# Patient Record
Sex: FEMALE | Employment: OTHER | URBAN - METROPOLITAN AREA
[De-identification: names, ages, dates, MRNs, and addresses within clinical notes are randomized per-mention and may not be internally consistent; named-entity substitution may affect disease eponyms.]

---

## 2017-04-06 ENCOUNTER — HOSPITAL ENCOUNTER (OUTPATIENT)
Dept: ULTRASOUND IMAGING | Age: 82
Discharge: HOME | End: 2017-04-06
Attending: FAMILY MEDICINE
Payer: MEDICARE

## 2017-04-06 DIAGNOSIS — R11.2 VOMITING WITH NAUSEA, NOT INTRACTABLE: ICD-10-CM

## 2017-04-06 PROCEDURE — 76856 US EXAM PELVIC COMPLETE: CPT

## 2017-04-06 PROCEDURE — 76700 US EXAM ABDOM COMPLETE: CPT

## 2017-06-25 ENCOUNTER — APPOINTMENT (OUTPATIENT)
Dept: CT IMAGING | Age: 82
End: 2017-06-25
Attending: STUDENT IN AN ORGANIZED HEALTH CARE EDUCATION/TRAINING PROGRAM
Payer: MEDICARE

## 2017-06-25 ENCOUNTER — HOSPITAL ENCOUNTER (EMERGENCY)
Age: 82
Discharge: HOME | End: 2017-06-25
Attending: STUDENT IN AN ORGANIZED HEALTH CARE EDUCATION/TRAINING PROGRAM
Payer: MEDICARE

## 2017-06-25 VITALS
HEART RATE: 60 BPM | BODY MASS INDEX: 29.25 KG/M2 | SYSTOLIC BLOOD PRESSURE: 133 MMHG | RESPIRATION RATE: 15 BRPM | OXYGEN SATURATION: 94 % | DIASTOLIC BLOOD PRESSURE: 78 MMHG | TEMPERATURE: 98 F | WEIGHT: 130 LBS | HEIGHT: 56 IN
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
## 2017-06-25 DIAGNOSIS — S09.90XA CHI (CLOSED HEAD INJURY), INITIAL ENCOUNTER: ICD-10-CM

## 2017-06-25 PROCEDURE — 72125 CT NECK SPINE W/O DYE: CPT

## 2017-06-25 PROCEDURE — 70450 CT HEAD/BRAIN W/O DYE: CPT

## 2017-06-25 PROCEDURE — 99284 EMERGENCY DEPT VISIT MOD MDM: CPT

## 2017-06-25 NOTE — DISCHARGE INSTRUCTIONS
We hope that we have addressed all of your medical concerns. The examination and treatment you received in the Emergency Department were for an emergent problem and were not intended as complete care. It is important that you follow up with your healthcare provider(s) for ongoing care. If your symptoms worsen or do not improve as expected, and you are unable to reach your usual health care provider(s), you should return to the Emergency Department. Today's healthcare is undergoing tremendous change, and patient satisfaction surveys are one of the many tools to assess the quality of medical care. You may receive a survey from the [employer] regarding your experience in the Emergency Department. I hope that your experience has been completely positive, particularly the medical care that I provided. As such, please participate in the survey; anything less than excellent does not meet my expectations or intentions. [facility] and [address] [place] [address] participate in nationally recognized quality of care measures. If your blood pressure is greater than 120/80, as reported below, we urge that you seek medical care to address the potential of high blood pressure, commonly known as hypertension. Hypertension can be hereditary or can be caused by certain medical conditions, pain, stress, or \"white coat syndrome. \"       Please make an appointment with your health care provider(s) for follow up of your Emergency Department visit. VITALS:   Patient Vitals for the past 8 hrs:   Temp Pulse Resp BP SpO2   06/25/17 0400 97.9 °F (36.6 °C) 79 16 153/81 95 %          Thank you for allowing us to provide you with medical care today. We realize that you have many choices for your emergency care needs. Please choose us in the future for any continued health care needs. Barron [place], [facility].   Office: [phone]            No results found for this or any previous visit (from the past 24 hour(s)). Ct Head Wo Cont    Result Date: 6/25/2017  INDICATION: fall, closed head injury EXAM: CT HEAD without contrast. CT dose reduction was achieved through use of a standardized protocol tailored for this examination and automatic exposure control for dose modulation. FINDINGS: Unenhanced CT Head is performed. The brain parenchyma is unremarkable in appearance for age, without evidence for infarct. There is no bleed, mass, shift, hydrocephalus or extra-axial fluid collection. Bone windows are unremarkable. IMPRESSION: No Intracranial Disease Evident on Head CT. Ct Spine Cerv Wo Cont    Result Date: 6/25/2017  INDICATION: Neck pain following trauma EXAM: Axial unenhanced CT of the cervical spine is performed with 2D coronal and sagittal reformatted images provided. CT dose reduction was achieved through use of a standardized protocol tailored for this examination and automatic exposure control for dose modulation. There is no fracture or significant subluxation. There is multilevel degenerative disc disease. There is no prevertebral soft tissue swelling. IMPRESSION: No fracture. Learning About a Closed Head Injury  What is a closed head injury? A closed head injury happens when your head gets hit hard. The strong force of the blow causes your brain to shake in your skull. This movement can cause the brain to bruise, swell, or tear. Sometimes nerves or blood vessels also get damaged. This can cause bleeding in or around the brain. A concussion is a type of closed head injury. What are the symptoms? If you have a mild concussion, you may have a mild headache or feel \"not quite right. \" These symptoms are common. They usually go away over a few days to 4 weeks. But sometimes after a concussion, you feel like you can't function as well as before the injury. And you have new symptoms.  This is called postconcussive syndrome. You may:  · Find it harder to solve problems, think, concentrate, or remember. · Have headaches. · Have changes in your sleep patterns, such as not being able to sleep or sleeping all the time. · Have changes in your personality. · Not be interested in your usual activities. · Feel angry or anxious without a clear reason. · Lose your sense of taste or smell. · Be dizzy, lightheaded, or unsteady. It may be hard to stand or walk. How is a closed head injury treated? Any person who may have a concussion needs to see a doctor. Some people have to stay in the hospital to be watched. Others can go home safely. If you go home, follow your doctor's instructions. He or she will tell you if you need someone to watch you closely for the next 24 hours or longer. Rest is the best treatment. Get plenty of sleep at night. And try to rest during the day. · Avoid activities that are physically or mentally demanding. These include housework, exercise, and schoolwork. And don't play video games, send text messages, or use the computer. You may need to change your school or work schedule to be able to avoid these activities. · Ask your doctor when it's okay to drive, ride a bike, or operate machinery. · Take an over-the-counter pain medicine, such as acetaminophen (Tylenol), ibuprofen (Advil, Motrin), or naproxen (Aleve). Be safe with medicines. Read and follow all instructions on the label. · Check with your doctor before you use any other medicines for pain. · Do not drink alcohol or use illegal drugs. They can slow recovery. They can also increase your risk of getting a second head injury. Follow-up care is a key part of your treatment and safety. Be sure to make and go to all appointments, and call your doctor if you are having problems. It's also a good idea to know your test results and keep a list of the medicines you take. Where can you learn more?   Go to http://nohemy-niru.info/. Enter E235 in the search box to learn more about \"Learning About a Closed Head Injury. \"  Current as of: October 14, 2016  Content Version: 11.3  © 3832-2270 [employer]. Care instructions adapted under license by [employer] (which disclaims liability or warranty for this information). If you have questions about a medical condition or this instruction, always ask your healthcare professional. [place] [address] any warranty or liability for your use of this information. Preventing Falls: Care Instructions  Your Care Instructions  Getting around your home safely can be a challenge if you have injuries or health problems that make it easy for you to fall. Loose rugs and furniture in walkways are among the dangers for many older people who have problems walking or who have poor eyesight. People who have conditions such as arthritis, osteoporosis, or dementia also have to be careful not to fall. You can make your home safer with a few simple measures. Follow-up care is a key part of your treatment and safety. Be sure to make and go to all appointments, and call your doctor if you are having problems. It's also a good idea to know your test results and keep a list of the medicines you take. How can you care for yourself at home? Taking care of yourself  · You may get dizzy if you do not drink enough water. To prevent dehydration, drink plenty of fluids, enough so that your urine is light yellow or clear like water. Choose water and other caffeine-free clear liquids. If you have kidney, heart, or liver disease and have to limit fluids, talk with your doctor before you increase the amount of fluids you drink. · Exercise regularly to improve your strength, muscle tone, and balance. Walk if you can. Swimming may be a good choice if you cannot walk easily.   · Have your vision and hearing checked each year or any time you notice a change. If you have trouble seeing and hearing, you might not be able to avoid objects and could lose your balance. · Know the side effects of the medicines you take. Ask your doctor or pharmacist whether the medicines you take can affect your balance. Sleeping pills or sedatives can affect your balance. · Limit the amount of alcohol you drink. Alcohol can impair your balance and other senses. · Ask your doctor whether calluses or corns on your feet need to be removed. If you wear loose-fitting shoes because of calluses or corns, you can lose your balance and fall. · Talk to your doctor if you have numbness in your feet. Preventing falls at home  · Remove raised doorway thresholds, throw rugs, and clutter. Repair loose carpet or raised areas in the floor. · Move furniture and electrical cords to keep them out of walking paths. · Use nonskid floor wax, and wipe up spills right away, especially on ceramic tile floors. · If you use a walker or cane, put rubber tips on it. If you use crutches, clean the bottoms of them regularly with an abrasive pad, such as steel wool. · Keep your house well lit, especially Graciella Sandifer, and outside walkways. Use night-lights in areas such as hallways and bathrooms. Add extra light switches or use remote switches (such as switches that go on or off when you clap your hands) to make it easier to turn lights on if you have to get up during the night. · Install sturdy handrails on stairways. · Move items in your cabinets so that the things you use a lot are on the lower shelves (about waist level). · Keep a cordless phone and a flashlight with new batteries by your bed. If possible, put a phone in each of the main rooms of your house, or carry a cell phone in case you fall and cannot reach a phone. Or, you can wear a device around your neck or wrist. You push a button that sends a signal for help.   · Wear low-heeled shoes that fit well and give your feet good support. Use footwear with nonskid soles. Check the heels and soles of your shoes for wear. Repair or replace worn heels or soles. · Do not wear socks without shoes on wood floors. · Walk on the grass when the sidewalks are slippery. If you live in an area that gets snow and ice in the winter, sprinkle salt on slippery steps and sidewalks. Preventing falls in the bath  · Install grab bars and nonskid mats inside and outside your shower or tub and near the toilet and sinks. · Use shower chairs and bath benches. · Use a hand-held shower head that will allow you to sit while showering. · Get into a tub or shower by putting the weaker leg in first. Get out of a tub or shower with your strong side first.  · Repair loose toilet seats and consider installing a raised toilet seat to make getting on and off the toilet easier. · Keep your bathroom door unlocked while you are in the shower. Where can you learn more? Go to http://nohemy-niru.info/. Enter 0476 [id number] in the search box to learn more about \"Preventing Falls: Care Instructions. \"  Current as of: August 4, 2016  Content Version: 11.3  © 1500-0137 [employer]. Care instructions adapted under license by [employer] (which disclaims liability or warranty for this information). If you have questions about a medical condition or this instruction, always ask your healthcare professional. [place] [address] any warranty or liability for your use of this information.

## 2017-06-25 NOTE — ED NOTES
I have reviewed discharge instructions with the patient. The patient verbalized understanding. Time allotted for questions. VSS. Pt wheeled out of unit with family members.

## 2017-06-25 NOTE — ED PROVIDER NOTES
HPI Comments: Patient is a 61-year-old female with a history of dementia who was brought in by EMS after a ground-level fall and head injury. History is provided by the patient's family members who are providing interpretation. The patient states that she got up to use the bathroom and tripped, causing her to fall and hit her head. This was unwitnessed fall and there is unknown loss of consciousness. The patient was able to crawl to her family members and wake him up, at which point he put her in a bed and called 911. At baseline, the patient is able to ambulate independently. Currently the mother states she has been unable to walk since the fall. Patient denies neck pain, chest pain, abd pain, hip pain, pain in extremities. She notes she has a bump on her forehead and a mild headache. The history is limited by a language barrier. A  was used (family members). Past Medical History:   Diagnosis Date    DEMENTIA     Endocrine disease     adrenal gland issues    Gastrointestinal disorder     C. Diff    Hypertension     Malaria     UTI (lower urinary tract infection)        Past Surgical History:   Procedure Laterality Date    HX ORTHOPAEDIC      ORIF arm         No family history on file. Social History     Social History    Marital status: SINGLE     Spouse name: N/A    Number of children: N/A    Years of education: N/A     Occupational History    Not on file. Social History Main Topics    Smoking status: Never Smoker    Smokeless tobacco: Never Used    Alcohol use No    Drug use: No    Sexual activity: No     Other Topics Concern    Not on file     Social History Narrative         ALLERGIES: Aspirin; Sulfa (sulfonamide antibiotics); Lidocaine; Novocain [procaine]; and Penicillins    Review of Systems   Constitutional: Negative for chills and fever. HENT: Negative for sore throat. Respiratory: Negative for cough and shortness of breath.     Cardiovascular: Negative for chest pain. Gastrointestinal: Negative for abdominal pain and vomiting. Genitourinary: Negative for dysuria. Musculoskeletal: Negative for back pain. Skin: Negative for rash. Neurological: Positive for dizziness and headaches. Negative for syncope. Psychiatric/Behavioral: Negative for confusion. All other systems reviewed and are negative. Vitals:    06/25/17 0400   BP: 153/81   Pulse: 79   Resp: 16   Temp: 97.9 °F (36.6 °C)   SpO2: 95%   Weight: 59 kg (130 lb)   Height: 4' 8\" (1.422 m)            Physical Exam   Constitutional: She is oriented to person, place, and time. She appears well-developed. No distress. HENT:   Head: Normocephalic. Right Ear: External ear normal.   Left Ear: External ear normal.   Nose: Nose normal.   Mouth/Throat: Oropharynx is clear and moist. No oropharyngeal exudate. Hematoma noted on R frontal scalp, no laceration or bleeding. Eyes: Conjunctivae and EOM are normal. Pupils are equal, round, and reactive to light. Neck: Normal range of motion. Neck supple. Cardiovascular: Normal rate, regular rhythm and normal heart sounds. No murmur heard. Pulmonary/Chest: Effort normal and breath sounds normal. No respiratory distress. Abdominal: Soft. Bowel sounds are normal. She exhibits no distension. There is no tenderness. There is no rebound. Musculoskeletal: Normal range of motion. She exhibits no edema, tenderness or deformity. Neurological: She is alert and oriented to person, place, and time. No cranial nerve deficit. She exhibits normal muscle tone. Coordination normal.   Skin: Skin is warm and dry. No rash noted. Psychiatric: She has a normal mood and affect. Her behavior is normal.   Nursing note and vitals reviewed. MDM  ED Course   The patient presented with a complaint of a fall or minor trauma. The patient is now resting comfortably and feels better, is alert and in no distress.  The patient has a normal mental status and is neurologically intact. The history, exam, diagnostic testing (if any) and current condition do not demonstrate signs of clinically significant intra-cranial, intra-thoracic, intra-abdominal, or musculoskeletal trauma. The vital signs have been stable. The patient's condition is stable and appropriate for discharge. The patient will pursue further outpatient evaluation with the primary care physician or other designated or consulting physician as indicated in the discharge instructions.       Procedures

## 2017-06-25 NOTE — ED TRIAGE NOTES
Triage note: Pt arrives via EMS after a fall at home. Pt hit her head and has a lump. Unknown if LOC. Pt fell d/t dizziness.

## 2019-08-01 ENCOUNTER — HOSPITAL ENCOUNTER (OUTPATIENT)
Dept: GENERAL RADIOLOGY | Age: 84
Discharge: HOME | End: 2019-08-01
Payer: MEDICARE

## 2019-08-01 DIAGNOSIS — R42 DIZZINESS AND GIDDINESS: ICD-10-CM

## 2019-08-01 PROCEDURE — 71046 X-RAY EXAM CHEST 2 VIEWS: CPT

## 2019-08-18 ENCOUNTER — APPOINTMENT (OUTPATIENT)
Dept: CT IMAGING | Age: 84
End: 2019-08-18
Attending: EMERGENCY MEDICINE
Payer: MEDICARE

## 2019-08-18 ENCOUNTER — APPOINTMENT (OUTPATIENT)
Dept: GENERAL RADIOLOGY | Age: 84
End: 2019-08-18
Attending: EMERGENCY MEDICINE
Payer: MEDICARE

## 2019-08-18 ENCOUNTER — HOSPITAL ENCOUNTER (EMERGENCY)
Age: 84
Discharge: HOME | End: 2019-08-18
Attending: EMERGENCY MEDICINE
Payer: MEDICARE

## 2019-08-18 DIAGNOSIS — R42 VERTIGO: ICD-10-CM

## 2019-08-18 DIAGNOSIS — R55 SYNCOPE AND COLLAPSE: Primary | ICD-10-CM

## 2019-08-18 DIAGNOSIS — E87.6 HYPOKALEMIA: ICD-10-CM

## 2019-08-18 LAB
ALBUMIN SERPL-MCNC: 3.2 G/DL (ref 3.5–5)
ALBUMIN/GLOB SERPL: 0.7 {RATIO} (ref 1.1–2.2)
ALP SERPL-CCNC: 100 U/L (ref 45–117)
ALT SERPL-CCNC: 13 U/L (ref 12–78)
ANION GAP SERPL CALC-SCNC: 10 MMOL/L (ref 5–15)
APPEARANCE UR: CLEAR
AST SERPL-CCNC: 13 U/L (ref 15–37)
BACTERIA URNS QL MICRO: NEGATIVE /HPF
BASOPHILS # BLD: 0 K/UL (ref 0–0.1)
BASOPHILS NFR BLD: 0 % (ref 0–1)
BILIRUB SERPL-MCNC: 0.5 MG/DL (ref 0.2–1)
BILIRUB UR QL: NEGATIVE
BNP SERPL-MCNC: 114 PG/ML
BUN SERPL-MCNC: 13 MG/DL (ref 6–20)
BUN/CREAT SERPL: 12 (ref 12–20)
CALCIUM SERPL-MCNC: 8.7 MG/DL (ref 8.5–10.1)
CHLORIDE SERPL-SCNC: 101 MMOL/L (ref 97–108)
CO2 SERPL-SCNC: 23 MMOL/L (ref 21–32)
COLOR UR: ABNORMAL
COMMENT, HOLDF: NORMAL
CREAT SERPL-MCNC: 1.1 MG/DL (ref 0.55–1.02)
DIFFERENTIAL METHOD BLD: ABNORMAL
EOSINOPHIL # BLD: 0.2 K/UL (ref 0–0.4)
EOSINOPHIL NFR BLD: 6 % (ref 0–7)
EPITH CASTS URNS QL MICRO: ABNORMAL /LPF
ERYTHROCYTE [DISTWIDTH] IN BLOOD BY AUTOMATED COUNT: 14.6 % (ref 11.5–14.5)
GLOBULIN SER CALC-MCNC: 4.4 G/DL (ref 2–4)
GLUCOSE SERPL-MCNC: 119 MG/DL (ref 65–100)
GLUCOSE UR STRIP.AUTO-MCNC: NEGATIVE MG/DL
HCT VFR BLD AUTO: 29.7 % (ref 35–47)
HGB BLD-MCNC: 9.7 G/DL (ref 11.5–16)
HGB UR QL STRIP: NEGATIVE
HYALINE CASTS URNS QL MICRO: ABNORMAL /LPF (ref 0–5)
IMM GRANULOCYTES # BLD AUTO: 0 K/UL (ref 0–0.04)
IMM GRANULOCYTES NFR BLD AUTO: 1 % (ref 0–0.5)
KETONES UR QL STRIP.AUTO: ABNORMAL MG/DL
LEUKOCYTE ESTERASE UR QL STRIP.AUTO: NEGATIVE
LYMPHOCYTES # BLD: 1.1 K/UL (ref 0.8–3.5)
LYMPHOCYTES NFR BLD: 26 % (ref 12–49)
MAGNESIUM SERPL-MCNC: 1.7 MG/DL (ref 1.6–2.4)
MCH RBC QN AUTO: 27.4 PG (ref 26–34)
MCHC RBC AUTO-ENTMCNC: 32.7 G/DL (ref 30–36.5)
MCV RBC AUTO: 83.9 FL (ref 80–99)
MONOCYTES # BLD: 0.5 K/UL (ref 0–1)
MONOCYTES NFR BLD: 11 % (ref 5–13)
NEUTS SEG # BLD: 2.5 K/UL (ref 1.8–8)
NEUTS SEG NFR BLD: 56 % (ref 32–75)
NITRITE UR QL STRIP.AUTO: NEGATIVE
NRBC # BLD: 0 K/UL (ref 0–0.01)
NRBC BLD-RTO: 0 PER 100 WBC
PH UR STRIP: 6 [PH] (ref 5–8)
PLATELET # BLD AUTO: 201 K/UL (ref 150–400)
PMV BLD AUTO: 11.3 FL (ref 8.9–12.9)
POTASSIUM SERPL-SCNC: 3.4 MMOL/L (ref 3.5–5.1)
PROT SERPL-MCNC: 7.6 G/DL (ref 6.4–8.2)
PROT UR STRIP-MCNC: NEGATIVE MG/DL
RBC # BLD AUTO: 3.54 M/UL (ref 3.8–5.2)
RBC #/AREA URNS HPF: ABNORMAL /HPF (ref 0–5)
SAMPLES BEING HELD,HOLD: NORMAL
SODIUM SERPL-SCNC: 134 MMOL/L (ref 136–145)
SP GR UR REFRACTOMETRY: 1 (ref 1–1.03)
TROPONIN I SERPL-MCNC: <0.05 NG/ML
TSH SERPL DL<=0.05 MIU/L-ACNC: 3.97 UIU/ML (ref 0.36–3.74)
UR CULT HOLD, URHOLD: NORMAL
UROBILINOGEN UR QL STRIP.AUTO: 0.2 EU/DL (ref 0.2–1)
WBC # BLD AUTO: 4.3 K/UL (ref 3.6–11)
WBC URNS QL MICRO: ABNORMAL /HPF (ref 0–4)

## 2019-08-18 PROCEDURE — 85025 COMPLETE CBC W/AUTO DIFF WBC: CPT

## 2019-08-18 PROCEDURE — 83880 ASSAY OF NATRIURETIC PEPTIDE: CPT

## 2019-08-18 PROCEDURE — 81001 URINALYSIS AUTO W/SCOPE: CPT

## 2019-08-18 PROCEDURE — 93005 ELECTROCARDIOGRAM TRACING: CPT

## 2019-08-18 PROCEDURE — 84443 ASSAY THYROID STIM HORMONE: CPT

## 2019-08-18 PROCEDURE — 84484 ASSAY OF TROPONIN QUANT: CPT

## 2019-08-18 PROCEDURE — 80053 COMPREHEN METABOLIC PANEL: CPT

## 2019-08-18 PROCEDURE — 36415 COLL VENOUS BLD VENIPUNCTURE: CPT

## 2019-08-18 PROCEDURE — 99285 EMERGENCY DEPT VISIT HI MDM: CPT

## 2019-08-18 PROCEDURE — 94762 N-INVAS EAR/PLS OXIMTRY CONT: CPT

## 2019-08-18 PROCEDURE — P9612 CATHETERIZE FOR URINE SPEC: HCPCS

## 2019-08-18 PROCEDURE — 83735 ASSAY OF MAGNESIUM: CPT

## 2019-08-18 PROCEDURE — 71045 X-RAY EXAM CHEST 1 VIEW: CPT

## 2019-08-18 PROCEDURE — 77030011943

## 2019-08-18 PROCEDURE — 70450 CT HEAD/BRAIN W/O DYE: CPT

## 2019-08-18 PROCEDURE — 74011250636 HC RX REV CODE- 250/636: Performed by: EMERGENCY MEDICINE

## 2019-08-18 RX ORDER — MECLIZINE HYDROCHLORIDE 25 MG/1
25 TABLET ORAL
Qty: 15 TAB | Refills: 0 | Status: SHIPPED | OUTPATIENT
Start: 2019-08-18 | End: 2019-08-28

## 2019-08-18 RX ORDER — ONDANSETRON 4 MG/1
4 TABLET, ORALLY DISINTEGRATING ORAL
Qty: 12 TAB | Refills: 0 | Status: SHIPPED | OUTPATIENT
Start: 2019-08-18

## 2019-08-18 RX ORDER — MECLIZINE HCL 12.5 MG 12.5 MG/1
25 TABLET ORAL
Status: COMPLETED | OUTPATIENT
Start: 2019-08-18 | End: 2019-08-18

## 2019-08-18 RX ADMIN — SODIUM CHLORIDE 1000 ML: 900 INJECTION, SOLUTION INTRAVENOUS at 23:22

## 2019-08-18 RX ADMIN — MECLIZINE 25 MG: 12.5 TABLET ORAL at 20:18

## 2019-08-18 NOTE — ED TRIAGE NOTES
Patient presents from home with complaints of syncopal episode that occurred about 20 mins ago. Patient has history of syncope. Patient arrives on 4 L NC for room air sats  At 86%.   Patient is on room air at baseline

## 2019-08-18 NOTE — ED PROVIDER NOTES
The history is provided by a relative. Vomiting    This is a chronic problem. Episode onset: several months ago. Episode frequency: mostly after meals. The problem has not changed since onset. The emesis has an appearance of stomach contents. There has been no fever. Pertinent negatives include no fever. Risk factors: no sick contacts, no travel, no ABx history. Syncope    This is a new problem. The current episode started less than 1 hour ago. The problem occurs constantly. The problem has been rapidly improving. She lost consciousness for a period of 1 to 5 minutes. Associated symptoms include vomiting. Pertinent negatives include no fever. Past Medical History:   Diagnosis Date    Dementia     Endocrine disease     adrenal gland issues    Gastrointestinal disorder     C. Diff    Hypertension     Malaria     UTI (lower urinary tract infection)        Past Surgical History:   Procedure Laterality Date    HX ORTHOPAEDIC      ORIF arm         History reviewed. No pertinent family history.     Social History     Socioeconomic History    Marital status: SINGLE     Spouse name: Not on file    Number of children: Not on file    Years of education: Not on file    Highest education level: Not on file   Occupational History    Not on file   Social Needs    Financial resource strain: Not on file    Food insecurity:     Worry: Not on file     Inability: Not on file    Transportation needs:     Medical: Not on file     Non-medical: Not on file   Tobacco Use    Smoking status: Never Smoker    Smokeless tobacco: Never Used   Substance and Sexual Activity    Alcohol use: No    Drug use: No    Sexual activity: Never   Lifestyle    Physical activity:     Days per week: Not on file     Minutes per session: Not on file    Stress: Not on file   Relationships    Social connections:     Talks on phone: Not on file     Gets together: Not on file     Attends Jainism service: Not on file     Active member of club or organization: Not on file     Attends meetings of clubs or organizations: Not on file     Relationship status: Not on file    Intimate partner violence:     Fear of current or ex partner: Not on file     Emotionally abused: Not on file     Physically abused: Not on file     Forced sexual activity: Not on file   Other Topics Concern    Not on file   Social History Narrative    Not on file         ALLERGIES: Aspirin; Sulfa (sulfonamide antibiotics); Lidocaine; Novocain [procaine]; and Penicillins    Review of Systems   Constitutional: Negative for fever. Cardiovascular: Positive for syncope. Gastrointestinal: Positive for vomiting. All other systems reviewed and are negative. Vitals:    08/18/19 1921   BP: 136/62   Pulse: 83   Resp: 18   Temp: 97.8 °F (36.6 °C)   SpO2: 100%            Physical Exam   Constitutional: She appears well-developed and well-nourished. No distress. HENT:   Head: Normocephalic and atraumatic. Mouth/Throat: Oropharynx is clear and moist.   Eyes: Pupils are equal, round, and reactive to light. Conjunctivae, EOM and lids are normal.   Positive left head impulse test   Neck: Neck supple. Cardiovascular: Normal rate, regular rhythm and normal heart sounds. Pulmonary/Chest: Effort normal and breath sounds normal. No respiratory distress. Abdominal: She exhibits no distension. There is no tenderness. There is no rebound and no guarding. Musculoskeletal: Normal range of motion. She exhibits no deformity. Neurological: She is alert. She has normal strength. No cranial nerve deficit or sensory deficit. Skin: Skin is warm and dry. Capillary refill takes less than 2 seconds. Psychiatric: She has a normal mood and affect. Her behavior is normal.   Nursing note and vitals reviewed.        MDM     58-year-old female presents with multiple months of ongoing difficulty with vertigo, having episodes of vomiting frequently especially after meals, loss of appetite tonight having a single syncopal event where she gagged, lost consciousness and became unresponsive for a few minutes and resolved prior to arrival of EMS who brought her here. CT head is unremarkable, no findings on lab work-up or urine to suggest infection, metabolic or hematologic abnormality, significant dehydration, severe malnutrition, ACS, arrhythmia or other concerning findings. These were discussed at length with the patient and family members who are quite concerned. Since she has had no recurrence of her symptoms the most common cause here would be a vasovagal event. Her vertigo was improved with meclizine so that will be prescribed for home. I discussed follow-up with neurology if vertigo is persistent despite medical therapy and instructed them on follow-up with gastroenterology if she continues to have frequent vomiting episodes but there is no indication for emergent hospitalization or intervention at this time. Plan to follow up with PCP as needed and return precautions discussed for worsening or new concerning symptoms. Procedures    ED EKG interpretation:  Rhythm: normal sinus rhythm; and regular . Rate (approx.): 79; Non-specific inferior lateral T wave abnormality; No change from previous EKG tracing;     Note written by Sidney Mariee, as dictated by Jason Dial MD 7:26 PM

## 2019-08-19 VITALS
HEART RATE: 72 BPM | RESPIRATION RATE: 16 BRPM | TEMPERATURE: 98 F | DIASTOLIC BLOOD PRESSURE: 65 MMHG | OXYGEN SATURATION: 94 % | SYSTOLIC BLOOD PRESSURE: 108 MMHG

## 2019-08-19 LAB
ATRIAL RATE: 79 BPM
CALCULATED P AXIS, ECG09: 20 DEGREES
CALCULATED R AXIS, ECG10: -25 DEGREES
CALCULATED T AXIS, ECG11: -5 DEGREES
DIAGNOSIS, 93000: NORMAL
P-R INTERVAL, ECG05: 124 MS
Q-T INTERVAL, ECG07: 370 MS
QRS DURATION, ECG06: 74 MS
QTC CALCULATION (BEZET), ECG08: 424 MS
VENTRICULAR RATE, ECG03: 79 BPM

## 2019-08-19 NOTE — ED NOTES
2000 Bedside shift change report given to Amadou Melgar RN (oncoming nurse) by Goldie Moeller RN (offgoing nurse). Report included the following information SBAR and ED Summary. 2008 Dr. Victor M Dodson @ bedside    2010 pt updated on need for urine sample; unable to obtain at this time    2020 pt to CT    2115 pt still unable to void; will notify MD    [id number] [phone] [id number] pt assisted to restroom and voided x1    [id number] I have reviewed discharge instructions with the patient and family. The patient and family verbalized understanding.

## 2019-10-01 ENCOUNTER — HOSPITAL ENCOUNTER (OUTPATIENT)
Dept: ULTRASOUND IMAGING | Age: 84
Discharge: HOME | End: 2019-10-01
Attending: SPECIALIST
Payer: MEDICARE

## 2019-10-01 DIAGNOSIS — R11.2 NAUSEA WITH VOMITING, UNSPECIFIED: ICD-10-CM

## 2019-10-01 DIAGNOSIS — K21.9 GASTROESOPHAGEAL REFLUX DISEASE: ICD-10-CM

## 2019-10-01 DIAGNOSIS — K59.00 CONSTIPATION: ICD-10-CM

## 2019-10-01 DIAGNOSIS — F03.90 DEMENTIA (HCC): ICD-10-CM

## 2019-10-01 PROCEDURE — 76700 US EXAM ABDOM COMPLETE: CPT

## 2019-10-30 ENCOUNTER — DOCUMENTATION ONLY (OUTPATIENT)
Dept: NEUROLOGY | Age: 84
End: 2019-10-30

## 2019-11-04 ENCOUNTER — OFFICE VISIT (OUTPATIENT)
Dept: NEUROLOGY | Age: 84
End: 2019-11-04

## 2019-11-04 VITALS
OXYGEN SATURATION: 98 % | RESPIRATION RATE: 16 BRPM | DIASTOLIC BLOOD PRESSURE: 60 MMHG | SYSTOLIC BLOOD PRESSURE: 102 MMHG | HEART RATE: 81 BPM | HEIGHT: 56 IN | WEIGHT: 120 LBS | BODY MASS INDEX: 26.99 KG/M2

## 2019-11-04 DIAGNOSIS — R26.89 IMBALANCE: ICD-10-CM

## 2019-11-04 DIAGNOSIS — R55 SYNCOPE, UNSPECIFIED SYNCOPE TYPE: Primary | ICD-10-CM

## 2019-11-04 DIAGNOSIS — R42 VERTIGO: ICD-10-CM

## 2019-11-04 RX ORDER — GLUCOSAMINE SULFATE 1500 MG
POWDER IN PACKET (EA) ORAL DAILY
COMMUNITY
End: 2022-10-26

## 2019-11-04 NOTE — LETTER
11/6/19 Patient: Britton Carrero YOB: 1927 Date of Visit: 11/4/2019 Chapito Frazier MD 
[address] [address] VIA Facsimile: [phone] Dear Chapito Frazier MD, Thank you for referring Ms. Britton Carrero to [address] [place] [address] for evaluation. My notes for this consultation are attached. If you have questions, please do not hesitate to call me. I look forward to following your patient along with you. Sincerely, Julio Navarro MD

## 2019-11-04 NOTE — PROGRESS NOTES
Ms. David Pretty presents today as a new patient for evaluation of dizziness and an episode of unresponsiveness approximately three months ago. The episode lasted approximately three minutes. Depression screening done on patient.

## 2019-11-04 NOTE — PROGRESS NOTES
Neurology Consult Note      HISTORY PROVIDED BY: patient and daughter    Chief Complaint:   Chief Complaint   Patient presents with    Neurologic Problem    Dizziness      Subjective:    Dhaval Hubbard is a 80 y.o. right handed female who presents in consultation for episode of unresponsiveness. Pt is [place] speaking only and has reported history of dementia. Her daughter provides all of history. Pt was in bed and called daughter to help her up out of bed, was sitting in bed, then had LOC. She had not been feeling well that day and had vomited, daughter doesn't remember details, and pt wanted to go to bed early. She c/o very bad dizziness just prior to passing out. She was out for about 3 minutes. Her daughter called 911. According to the ED note dated 8/18/19, pt has chronic trouble with vomiting particularly after eating and had gagged, lost consciousness, and became unresponsive for a few minutes, resolved prior to EMS arrival. She was felt to have a vasovagal event. Her daughter's biggest concern today is dizziness, states that her mother c/o dizziness all of the time and she is afraid to let her ambulate alone due to fear she will fall. She has low BPs documented in PCP's note from July and mentions starting Florinef, but her daughter states that she didn't feel pt needed this stating she checks her BP all the time at home and it is fine. When asked to clarify \"dizziness\", daughter reporting spinning and imbalance. Saw an ENT who did something and spinning dizziness resolved, but still \"dizzy\" clarified as losing balance has continued. Denies lightheadedness with standing. She had an episode of syncope in 2016, daughter feels this was different b/c she came back right away with that episode.    She was seen by Cardiology with abnormal EKG, echo was normal, she cannot recall the name of the cardiologist.      In review of EMR, CT head wo contrast 8/18/19 was without acute changes, has chronic atrophy, stable CIWM changes. CT head 6/25/17 was done due to a fall with head injury - negative for acute changes. CD 4/11/16 - <50% stenosis bilateral, vertebrals with antegrade flow. CT head 4/11/16 for syncope with unresponsiveness x 4 minutes - neg. [facility] 4/1/14 for hypotension with syncope - neg. [facility] 4/6/11 for \"pain\" - no acute changes, CIWM changes only. EKG 8/18/19 - NSR, Nonspecific T wave abnormality. Echo 4/12/16 - EF 60-65%, no RWM abnormalities. Labs 7/22/2019 at PCPs office-TSH 2.17, FT4 0.95, B12 1404, ferritin 143, CMP-sodium 132, glucose 104, iron profile-normal, CBC with differential-hemoglobin 11.4, UA-trace LE. PCP office visit 7/22/2019 reviewed-complained of dizziness worse after movement and exercise room may be spinning has some nausea no vomiting also a question of daily headaches. Receiving some [place] and [place] medications, unclear what she is getting. Mentions several ED visits for vertigo over the years patient was referred to ENT. Mentions hypotension and considering Florinef. Past Medical History:   Diagnosis Date    Dementia     Endocrine disease     adrenal gland issues    Gastrointestinal disorder     C.  Diff    Hypertension     Malaria     UTI (lower urinary tract infection)       Past Surgical History:   Procedure Laterality Date    HX ORTHOPAEDIC      ORIF arm      Social History     Socioeconomic History    Marital status: UNKNOWN     Spouse name: Not on file    Number of children: Not on file    Years of education: Not on file    Highest education level: Not on file   Occupational History    Not on file   Social Needs    Financial resource strain: Not on file    Food insecurity:     Worry: Not on file     Inability: Not on file    Transportation needs:     Medical: Not on file     Non-medical: Not on file   Tobacco Use    Smoking status: Never Smoker    Smokeless tobacco: Never Used   Substance and Sexual Activity    Alcohol use: No    Drug use: No    Sexual activity: Never   Lifestyle    Physical activity:     Days per week: Not on file     Minutes per session: Not on file    Stress: Not on file   Relationships    Social connections:     Talks on phone: Not on file     Gets together: Not on file     Attends Restorationist service: Not on file     Active member of club or organization: Not on file     Attends meetings of clubs or organizations: Not on file     Relationship status: Not on file    Intimate partner violence:     Fear of current or ex partner: Not on file     Emotionally abused: Not on file     Physically abused: Not on file     Forced sexual activity: Not on file   Other Topics Concern    Not on file   Social History Narrative    Not on file     History reviewed. No pertinent family history. Objective:   Review of Systems   Constitutional: Positive for malaise/fatigue. HENT: Positive for hearing loss and tinnitus. Eyes: Negative. Respiratory: Positive for cough. Snoring   Cardiovascular: Positive for chest pain. Gastrointestinal: Positive for constipation, nausea and vomiting. Genitourinary: Negative. Musculoskeletal: Positive for falls, joint pain and myalgias. Skin: Positive for rash. Neurological: Positive for weakness and headaches. Endo/Heme/Allergies: Negative. Psychiatric/Behavioral: Positive for memory loss. Allergies   Allergen Reactions    Aspirin Other (comments)     Gi upset    Sulfa (Sulfonamide Antibiotics) Rash    Lidocaine Unknown (comments)     Novocaine    Novocain [Procaine] Unknown (comments)    Penicillins Nausea and Vomiting        Meds:  Outpatient Medications Prior to Visit   Medication Sig Dispense Refill    cholecalciferol (VITAMIN D3) 1,000 unit cap Take  by mouth daily. Indications: dose unknown      cranberry extract 450 mg tab tablet Take 450 mg by mouth.  mirtazapine (REMERON) 7.5 mg tablet Take 7.5 mg by mouth nightly.       ranitidine (ZANTAC) 75 mg tablet Take 150 mg by mouth two (2) times daily as needed for Indigestion.  b complex vitamins tablet Take 1 Tab by mouth daily.  QUEtiapine (SEROQUEL) 100 mg tablet Take 100 mg by mouth nightly.  L. acidoph & paracasei- S therm- Bifido (VEDA-Q) 8 billion cell cap cap Take 1 Cap by mouth daily.  donepezil (ARICEPT) 5 mg tablet Take 5 mg by mouth nightly.  ondansetron (ZOFRAN ODT) 4 mg disintegrating tablet Take 1 Tab by mouth every eight (8) hours as needed for Nausea. 12 Tab 0    ascorbic acid (VITAMIN C) 500 mg tablet Take 500 mg by mouth daily. No facility-administered medications prior to visit. Imaging:  MRI Results (most recent):  No results found for this or any previous visit. CT Results (most recent):  Results from Hospital Encounter encounter on 08/18/19   CT HEAD WO CONT    Narrative EXAM:  CT head without contrast    INDICATION: Syncope. COMPARISON: CT 6/25/2017    TECHNIQUE: Axial noncontrast head CT from foramen magnum to vertex. Coronal and  sagittal reformatted images were obtained. CT dose reduction was achieved  through use of a standardized protocol tailored for this examination and  automatic exposure control for dose modulation. Adaptive statistical iterative  reconstruction (ASIR) was utilized. FINDINGS:  There is diffuse age-related parenchymal volume loss. The ventricles  and sulci are age-appropriate without hydrocephalus. There is no mass effect or  midline shift. There is no intracranial hemorrhage or extra-axial fluid  collection. Scattered foci of low attenuation in the periventricular white  matter represent stable chronic microvascular ischemic changes. The gray-white  matter differentiation is maintained. The basal cisterns are patent. The osseous structures are intact. The visualized paranasal sinuses and mastoid  air cells are clear. Impression IMPRESSION:   No acute intracranial abnormality.           Reviewed records in [facility] and media tab today    Lab Review   Results for orders placed or performed during the hospital encounter of 08/18/19   URINE CULTURE HOLD SAMPLE   Result Value Ref Range    Urine culture hold        URINE ON HOLD IN MICROBIOLOGY DEPT FOR 3 DAYS. IF UNPRESERVED URINE IS SUBMITTED, IT CANNOT BE USED FOR ADDITIONAL TESTING AFTER 24 HRS, RECOLLECTION WILL BE REQUIRED. CBC WITH AUTOMATED DIFF   Result Value Ref Range    WBC 4.3 3.6 - 11.0 K/uL    RBC 3.54 (L) 3.80 - 5.20 M/uL    HGB 9.7 (L) 11.5 - 16.0 g/dL    HCT 29.7 (L) 35.0 - 47.0 %    MCV 83.9 80.0 - 99.0 FL    MCH 27.4 26.0 - 34.0 PG    MCHC 32.7 30.0 - 36.5 g/dL    RDW 14.6 (H) 11.5 - 14.5 %    PLATELET 555 960 - 557 K/uL    MPV 11.3 8.9 - 12.9 FL    NRBC 0.0 0  WBC    ABSOLUTE NRBC 0.00 0.00 - 0.01 K/uL    NEUTROPHILS 56 32 - 75 %    LYMPHOCYTES 26 12 - 49 %    MONOCYTES 11 5 - 13 %    EOSINOPHILS 6 0 - 7 %    BASOPHILS 0 0 - 1 %    IMMATURE GRANULOCYTES 1 (H) 0.0 - 0.5 %    ABS. NEUTROPHILS 2.5 1.8 - 8.0 K/UL    ABS. LYMPHOCYTES 1.1 0.8 - 3.5 K/UL    ABS. MONOCYTES 0.5 0.0 - 1.0 K/UL    ABS. EOSINOPHILS 0.2 0.0 - 0.4 K/UL    ABS. BASOPHILS 0.0 0.0 - 0.1 K/UL    ABS. IMM. GRANS. 0.0 0.00 - 0.04 K/UL    DF AUTOMATED     METABOLIC PANEL, COMPREHENSIVE   Result Value Ref Range    Sodium 134 (L) 136 - 145 mmol/L    Potassium 3.4 (L) 3.5 - 5.1 mmol/L    Chloride 101 97 - 108 mmol/L    CO2 23 21 - 32 mmol/L    Anion gap 10 5 - 15 mmol/L    Glucose 119 (H) 65 - 100 mg/dL    BUN 13 6 - 20 MG/DL    Creatinine 1.10 (H) 0.55 - 1.02 MG/DL    BUN/Creatinine ratio 12 12 - 20      GFR est AA 56 (L) >60 ml/min/1.73m2    GFR est non-AA 46 (L) >60 ml/min/1.73m2    Calcium 8.7 8.5 - 10.1 MG/DL    Bilirubin, total 0.5 0.2 - 1.0 MG/DL    ALT (SGPT) 13 12 - 78 U/L    AST (SGOT) 13 (L) 15 - 37 U/L    Alk.  phosphatase 100 45 - 117 U/L    Protein, total 7.6 6.4 - 8.2 g/dL    Albumin 3.2 (L) 3.5 - 5.0 g/dL    Globulin 4.4 (H) 2.0 - 4.0 g/dL    A-G Ratio 0.7 (L) 1.1 - 2. 2     SAMPLES BEING HELD   Result Value Ref Range    SAMPLES BEING HELD 1RED,1BLU     COMMENT        Add-on orders for these samples will be processed based on acceptable specimen integrity and analyte stability, which may vary by analyte. TSH 3RD GENERATION   Result Value Ref Range    TSH 3.97 (H) 0.36 - 3.74 uIU/mL   TROPONIN I   Result Value Ref Range    Troponin-I, Qt. <0.05 <0.05 ng/mL   NT-PRO BNP   Result Value Ref Range    NT pro- <450 PG/ML   MAGNESIUM   Result Value Ref Range    Magnesium 1.7 1.6 - 2.4 mg/dL   URINALYSIS W/MICROSCOPIC   Result Value Ref Range    Color YELLOW/STRAW      Appearance CLEAR CLEAR      Specific gravity 1.005 1.003 - 1.030      pH (UA) 6.0 5.0 - 8.0      Protein NEGATIVE  NEG mg/dL    Glucose NEGATIVE  NEG mg/dL    Ketone TRACE (A) NEG mg/dL    Bilirubin NEGATIVE  NEG      Blood NEGATIVE  NEG      Urobilinogen 0.2 0.2 - 1.0 EU/dL    Nitrites NEGATIVE  NEG      Leukocyte Esterase NEGATIVE  NEG      WBC 0-4 0 - 4 /hpf    RBC 0-5 0 - 5 /hpf    Epithelial cells FEW FEW /lpf    Bacteria NEGATIVE  NEG /hpf    Hyaline cast 0-2 0 - 5 /lpf   EKG, 12 LEAD, INITIAL   Result Value Ref Range    Ventricular Rate 79 BPM    Atrial Rate 79 BPM    P-R Interval 124 ms    QRS Duration 74 ms    Q-T Interval 370 ms    QTC Calculation (Bezet) 424 ms    Calculated P Axis 20 degrees    Calculated R Axis -25 degrees    Calculated T Axis -5 degrees    Diagnosis       Normal sinus rhythm  Nonspecific T wave abnormality  When compared with ECG of 11-APR-2016 20:06,  No significant change was found  Confirmed by Daniel You MD., --- ([id number]) on 8/19/2019 6:09:02 PM          Exam:  Visit Vitals  /60   Pulse 81   Resp 16   Ht 4' 8\" (1.422 m)   Wt 54.4 kg (120 lb)   SpO2 98%   BMI 26.90 kg/m²     General:  Alert, cooperative, no distress. Head:  Normocephalic, without obvious abnormality, atraumatic.    Respiratory:  Heart:   Non labored breathing  Regular rate and rhythm, no murmurs   Neck: 2+ carotids, no bruits   Extremities: Warm, no cyanosis or edema. Pulses: 2+ radial pulses. Neurologic:  MS: Alert, speech intact. Language -unable to fully assess, but pt able to follow all commands without difficulty, many without translation assistance from her daughter. Gave a few appropriate simple answers in English. Attention appropriate. Cranial Nerves:  II: visual fields Full to confrontation   II: pupils Equal, round, reactive to light   II: optic disc    III,VII: ptosis none   III,IV,VI: extraocular muscles  EOMI, no nystagmus or diplopia   V: facial light touch sensation  normal   VII: facial muscle function   symmetric   VIII: hearing intact   IX: soft palate elevation  normal   XI: trapezius strength     XI: sternocleidomastoid strength    XII: tongue  Midline     Motor: normal bulk and tone, no tremor              Strength: 5/5 throughout, no PD  Sensory: intact to LT, PP  Coordination: FTN and HTS intact, BOONE intact  Gait: slow cautious gait, slightly wide based. Tandem walking not attempted. Reflexes: 2+ symmetric           Assessment/Plan   Pt is a 80 y.o. right handed female with episode of syncope in August prompting this referral, but daughter most concerned about \"dizziness\" clarified as imbalance with h/o spinning dizziness that resolved after seeing an ENT. History is limited. In review of chart, patient has well documented history of orthostatic hypotension with syncope as far back as 2014, with episode of syncope in 2016 documented as unresponsive for 4 minutes. Episode documented in ED note 8/18/19 sounds most c/w vasovagal syncope or reflex syncope after vomiting. Exam is non-focal.  Suspect patient imbalance is multifactorial, but most prominent cause likely due to a vestibulopathy. Recommend PT for imbalance and vertigo. F/u with PCP for treatment of orthostatic hypotension. F/u in neurology as needed. ICD-10-CM ICD-9-CM    1.  Syncope, unspecified syncope type R55 780.2    2. Vertigo R42 780.4 REFERRAL TO PHYSICAL THERAPY   3. Imbalance R26.89 781.2 REFERRAL TO PHYSICAL THERAPY       Signed:   Regina Anaya MD  11/4/2019

## 2019-11-04 NOTE — PATIENT INSTRUCTIONS
[facility]   Statement to Patients  April 1, 2014      In an effort to ensure the large volume of patient prescription refills is processed in the most efficient and expeditious manner, we are asking our patients to assist us by calling your Pharmacy for all prescription refills, this will include also your  Mail Order Pharmacy. The pharmacy will contact our office electronically to continue the refill process. Please do not wait until the last minute to call your pharmacy. We need at least 48 hours (2days) to fill prescriptions. We also encourage you to call your pharmacy before going to  your prescription to make sure it is ready. With regard to controlled substance prescription refill requests (narcotic refills) that need to be picked up at our office, we ask your cooperation by providing us with at least 72 hours (3days) notice that you will need a refill. We will not refill narcotic prescription refill requests after 4:00pm on any weekday, Monday through Thursday, or after 2:00pm on Fridays, or on the weekends. We encourage everyone to explore another way of getting your prescription refill request processed using [employer], our patient web portal through our electronic medical record system. [employer] is an efficient and effective way to communicate your medication request directly to the office and  downloadable as an manisha on your smart phone . [employer] also features a review functionality that allows you to view your medication list as well as leave messages for your physician. Are you ready to get connected? If so please review the attatched instructions or speak to any of our staff to get you set up right away! Thank you so much for your cooperation. Should you have any questions please contact our Practice Administrator.     The Physicians and Staff,  [facility]

## 2019-11-07 ENCOUNTER — DOCUMENTATION ONLY (OUTPATIENT)
Dept: NEUROLOGY | Age: 84
End: 2019-11-07

## 2021-02-22 ENCOUNTER — APPOINTMENT (OUTPATIENT)
Dept: CT IMAGING | Age: 86
End: 2021-02-22
Attending: EMERGENCY MEDICINE
Payer: MEDICARE

## 2021-02-22 ENCOUNTER — HOSPITAL ENCOUNTER (OUTPATIENT)
Age: 86
Setting detail: OBSERVATION
Discharge: HOME HEALTH | End: 2021-02-25
Attending: EMERGENCY MEDICINE | Admitting: STUDENT IN AN ORGANIZED HEALTH CARE EDUCATION/TRAINING PROGRAM
Payer: MEDICARE

## 2021-02-22 ENCOUNTER — APPOINTMENT (OUTPATIENT)
Dept: GENERAL RADIOLOGY | Age: 86
End: 2021-02-22
Attending: EMERGENCY MEDICINE
Payer: MEDICARE

## 2021-02-22 DIAGNOSIS — R31.9 URINARY TRACT INFECTION WITH HEMATURIA, SITE UNSPECIFIED: Primary | ICD-10-CM

## 2021-02-22 DIAGNOSIS — N39.0 URINARY TRACT INFECTION WITH HEMATURIA, SITE UNSPECIFIED: Primary | ICD-10-CM

## 2021-02-22 DIAGNOSIS — R41.0 CONFUSION: ICD-10-CM

## 2021-02-22 DIAGNOSIS — R55 SYNCOPE AND COLLAPSE: ICD-10-CM

## 2021-02-22 PROBLEM — N30.00 ACUTE CYSTITIS: Status: ACTIVE | Noted: 2021-02-22

## 2021-02-22 LAB
ALBUMIN SERPL-MCNC: 3 G/DL (ref 3.5–5)
ALBUMIN/GLOB SERPL: 0.7 {RATIO} (ref 1.1–2.2)
ALP SERPL-CCNC: 94 U/L (ref 45–117)
ALT SERPL-CCNC: 17 U/L (ref 12–78)
ANION GAP SERPL CALC-SCNC: 8 MMOL/L (ref 5–15)
APPEARANCE UR: ABNORMAL
AST SERPL-CCNC: 27 U/L (ref 15–37)
BACTERIA URNS QL MICRO: ABNORMAL /HPF
BASOPHILS # BLD: 0 K/UL (ref 0–0.1)
BASOPHILS NFR BLD: 0 % (ref 0–1)
BILIRUB SERPL-MCNC: 0.4 MG/DL (ref 0.2–1)
BILIRUB UR QL: NEGATIVE
BNP SERPL-MCNC: 225 PG/ML
BUN SERPL-MCNC: 31 MG/DL (ref 6–20)
BUN/CREAT SERPL: 19 (ref 12–20)
CALCIUM SERPL-MCNC: 8.4 MG/DL (ref 8.5–10.1)
CHLORIDE SERPL-SCNC: 103 MMOL/L (ref 97–108)
CO2 SERPL-SCNC: 21 MMOL/L (ref 21–32)
COLOR UR: ABNORMAL
COMMENT, HOLDF: NORMAL
CREAT SERPL-MCNC: 1.63 MG/DL (ref 0.55–1.02)
D DIMER PPP FEU-MCNC: 1.55 MG/L FEU (ref 0–0.65)
DIFFERENTIAL METHOD BLD: ABNORMAL
EOSINOPHIL # BLD: 0.1 K/UL (ref 0–0.4)
EOSINOPHIL NFR BLD: 1 % (ref 0–7)
EPITH CASTS URNS QL MICRO: ABNORMAL /LPF
ERYTHROCYTE [DISTWIDTH] IN BLOOD BY AUTOMATED COUNT: 14.8 % (ref 11.5–14.5)
FOLATE SERPL-MCNC: 75 NG/ML (ref 5–21)
GLOBULIN SER CALC-MCNC: 4.3 G/DL (ref 2–4)
GLUCOSE SERPL-MCNC: 132 MG/DL (ref 65–100)
GLUCOSE UR STRIP.AUTO-MCNC: NEGATIVE MG/DL
HCT VFR BLD AUTO: 30.4 % (ref 35–47)
HGB BLD-MCNC: 10.4 G/DL (ref 11.5–16)
HGB UR QL STRIP: ABNORMAL
HYALINE CASTS URNS QL MICRO: ABNORMAL /LPF (ref 0–5)
IMM GRANULOCYTES # BLD AUTO: 0 K/UL (ref 0–0.04)
IMM GRANULOCYTES NFR BLD AUTO: 1 % (ref 0–0.5)
INR PPP: 1 (ref 0.9–1.1)
KETONES UR QL STRIP.AUTO: NEGATIVE MG/DL
LACTATE SERPL-SCNC: 1.2 MMOL/L (ref 0.4–2)
LEUKOCYTE ESTERASE UR QL STRIP.AUTO: ABNORMAL
LIPASE SERPL-CCNC: 247 U/L (ref 73–393)
LYMPHOCYTES # BLD: 0.9 K/UL (ref 0.8–3.5)
LYMPHOCYTES NFR BLD: 16 % (ref 12–49)
MAGNESIUM SERPL-MCNC: 1.7 MG/DL (ref 1.6–2.4)
MCH RBC QN AUTO: 28.3 PG (ref 26–34)
MCHC RBC AUTO-ENTMCNC: 34.2 G/DL (ref 30–36.5)
MCV RBC AUTO: 82.6 FL (ref 80–99)
MONOCYTES # BLD: 0.9 K/UL (ref 0–1)
MONOCYTES NFR BLD: 15 % (ref 5–13)
NEUTS SEG # BLD: 3.9 K/UL (ref 1.8–8)
NEUTS SEG NFR BLD: 67 % (ref 32–75)
NITRITE UR QL STRIP.AUTO: NEGATIVE
NRBC # BLD: 0 K/UL (ref 0–0.01)
NRBC BLD-RTO: 0 PER 100 WBC
PH UR STRIP: 5.5 [PH] (ref 5–8)
PHOSPHATE SERPL-MCNC: 3 MG/DL (ref 2.6–4.7)
PLATELET # BLD AUTO: 157 K/UL (ref 150–400)
PMV BLD AUTO: 10.8 FL (ref 8.9–12.9)
POTASSIUM SERPL-SCNC: 3.5 MMOL/L (ref 3.5–5.1)
PROT SERPL-MCNC: 7.3 G/DL (ref 6.4–8.2)
PROT UR STRIP-MCNC: 30 MG/DL
PROTHROMBIN TIME: 10.9 SEC (ref 9–11.1)
RBC # BLD AUTO: 3.68 M/UL (ref 3.8–5.2)
RBC #/AREA URNS HPF: ABNORMAL /HPF (ref 0–5)
SAMPLES BEING HELD,HOLD: NORMAL
SODIUM SERPL-SCNC: 132 MMOL/L (ref 136–145)
SP GR UR REFRACTOMETRY: 1.02 (ref 1–1.03)
TROPONIN I SERPL-MCNC: <0.05 NG/ML
TSH SERPL DL<=0.05 MIU/L-ACNC: 1.67 UIU/ML (ref 0.36–3.74)
UR CULT HOLD, URHOLD: NORMAL
UROBILINOGEN UR QL STRIP.AUTO: 0.2 EU/DL (ref 0.2–1)
VIT B12 SERPL-MCNC: 705 PG/ML (ref 193–986)
WBC # BLD AUTO: 5.8 K/UL (ref 3.6–11)
WBC URNS QL MICRO: >100 /HPF (ref 0–4)

## 2021-02-22 PROCEDURE — 82746 ASSAY OF FOLIC ACID SERUM: CPT

## 2021-02-22 PROCEDURE — 87077 CULTURE AEROBIC IDENTIFY: CPT

## 2021-02-22 PROCEDURE — 83880 ASSAY OF NATRIURETIC PEPTIDE: CPT

## 2021-02-22 PROCEDURE — 74011250636 HC RX REV CODE- 250/636: Performed by: STUDENT IN AN ORGANIZED HEALTH CARE EDUCATION/TRAINING PROGRAM

## 2021-02-22 PROCEDURE — 82607 VITAMIN B-12: CPT

## 2021-02-22 PROCEDURE — 99218 HC RM OBSERVATION: CPT

## 2021-02-22 PROCEDURE — 84484 ASSAY OF TROPONIN QUANT: CPT

## 2021-02-22 PROCEDURE — 85025 COMPLETE CBC W/AUTO DIFF WBC: CPT

## 2021-02-22 PROCEDURE — 83690 ASSAY OF LIPASE: CPT

## 2021-02-22 PROCEDURE — 70450 CT HEAD/BRAIN W/O DYE: CPT

## 2021-02-22 PROCEDURE — 87186 SC STD MICRODIL/AGAR DIL: CPT

## 2021-02-22 PROCEDURE — 85379 FIBRIN DEGRADATION QUANT: CPT

## 2021-02-22 PROCEDURE — 81001 URINALYSIS AUTO W/SCOPE: CPT

## 2021-02-22 PROCEDURE — 80053 COMPREHEN METABOLIC PANEL: CPT

## 2021-02-22 PROCEDURE — 36600 WITHDRAWAL OF ARTERIAL BLOOD: CPT

## 2021-02-22 PROCEDURE — 83605 ASSAY OF LACTIC ACID: CPT

## 2021-02-22 PROCEDURE — 87086 URINE CULTURE/COLONY COUNT: CPT

## 2021-02-22 PROCEDURE — 36415 COLL VENOUS BLD VENIPUNCTURE: CPT

## 2021-02-22 PROCEDURE — 85610 PROTHROMBIN TIME: CPT

## 2021-02-22 PROCEDURE — 83735 ASSAY OF MAGNESIUM: CPT

## 2021-02-22 PROCEDURE — 84443 ASSAY THYROID STIM HORMONE: CPT

## 2021-02-22 PROCEDURE — 71045 X-RAY EXAM CHEST 1 VIEW: CPT

## 2021-02-22 PROCEDURE — 93005 ELECTROCARDIOGRAM TRACING: CPT

## 2021-02-22 PROCEDURE — 99283 EMERGENCY DEPT VISIT LOW MDM: CPT

## 2021-02-22 PROCEDURE — 94761 N-INVAS EAR/PLS OXIMETRY MLT: CPT

## 2021-02-22 PROCEDURE — 84100 ASSAY OF PHOSPHORUS: CPT

## 2021-02-22 RX ORDER — ONDANSETRON 2 MG/ML
4 INJECTION INTRAMUSCULAR; INTRAVENOUS
Status: DISCONTINUED | OUTPATIENT
Start: 2021-02-22 | End: 2021-02-25 | Stop reason: HOSPADM

## 2021-02-22 RX ORDER — SODIUM CHLORIDE 0.9 % (FLUSH) 0.9 %
5-40 SYRINGE (ML) INJECTION EVERY 8 HOURS
Status: DISCONTINUED | OUTPATIENT
Start: 2021-02-22 | End: 2021-02-25 | Stop reason: HOSPADM

## 2021-02-22 RX ORDER — POLYETHYLENE GLYCOL 3350 17 G/17G
17 POWDER, FOR SOLUTION ORAL DAILY PRN
Status: DISCONTINUED | OUTPATIENT
Start: 2021-02-22 | End: 2021-02-25 | Stop reason: HOSPADM

## 2021-02-22 RX ORDER — PROMETHAZINE HYDROCHLORIDE 25 MG/1
12.5 TABLET ORAL
Status: DISCONTINUED | OUTPATIENT
Start: 2021-02-22 | End: 2021-02-25 | Stop reason: HOSPADM

## 2021-02-22 RX ORDER — ACETAMINOPHEN 650 MG/1
650 SUPPOSITORY RECTAL
Status: DISCONTINUED | OUTPATIENT
Start: 2021-02-22 | End: 2021-02-25 | Stop reason: HOSPADM

## 2021-02-22 RX ORDER — MIRTAZAPINE 15 MG/1
7.5 TABLET, FILM COATED ORAL
Status: DISCONTINUED | OUTPATIENT
Start: 2021-02-22 | End: 2021-02-25 | Stop reason: HOSPADM

## 2021-02-22 RX ORDER — SODIUM CHLORIDE 0.9 % (FLUSH) 0.9 %
5-40 SYRINGE (ML) INJECTION AS NEEDED
Status: DISCONTINUED | OUTPATIENT
Start: 2021-02-22 | End: 2021-02-25 | Stop reason: HOSPADM

## 2021-02-22 RX ORDER — FAMOTIDINE 20 MG/1
20 TABLET, FILM COATED ORAL
Status: DISCONTINUED | OUTPATIENT
Start: 2021-02-22 | End: 2021-02-25 | Stop reason: HOSPADM

## 2021-02-22 RX ORDER — ACETAMINOPHEN 325 MG/1
650 TABLET ORAL
Status: DISCONTINUED | OUTPATIENT
Start: 2021-02-22 | End: 2021-02-25 | Stop reason: HOSPADM

## 2021-02-22 RX ORDER — SODIUM CHLORIDE 9 MG/ML
100 INJECTION, SOLUTION INTRAVENOUS CONTINUOUS
Status: DISCONTINUED | OUTPATIENT
Start: 2021-02-22 | End: 2021-02-25

## 2021-02-22 RX ORDER — DONEPEZIL HYDROCHLORIDE 5 MG/1
5 TABLET, FILM COATED ORAL
Status: DISCONTINUED | OUTPATIENT
Start: 2021-02-22 | End: 2021-02-25 | Stop reason: HOSPADM

## 2021-02-22 RX ORDER — QUETIAPINE FUMARATE 100 MG/1
100 TABLET, FILM COATED ORAL
Status: DISCONTINUED | OUTPATIENT
Start: 2021-02-22 | End: 2021-02-25 | Stop reason: HOSPADM

## 2021-02-22 RX ADMIN — SODIUM CHLORIDE 1000 ML: 9 INJECTION, SOLUTION INTRAVENOUS at 23:05

## 2021-02-22 NOTE — ED TRIAGE NOTES
TRIAGE NOTE : Patient arrives by EMS from home with c/o witnessed syncope 45 minute PTA. Patient seen earlier and diagnosed with UTI and placed on Cipro.

## 2021-02-22 NOTE — ED NOTES
Attempted IV access x1. Unable to obtain access. Pt's family member verbally aggressive towards this RN. Eric Eisenberg RN at bedside to attempt to obtain access.

## 2021-02-23 ENCOUNTER — APPOINTMENT (OUTPATIENT)
Dept: VASCULAR SURGERY | Age: 86
End: 2021-02-23
Attending: STUDENT IN AN ORGANIZED HEALTH CARE EDUCATION/TRAINING PROGRAM
Payer: MEDICARE

## 2021-02-23 ENCOUNTER — APPOINTMENT (OUTPATIENT)
Dept: NON INVASIVE DIAGNOSTICS | Age: 86
End: 2021-02-23
Attending: STUDENT IN AN ORGANIZED HEALTH CARE EDUCATION/TRAINING PROGRAM
Payer: MEDICARE

## 2021-02-23 LAB
ANION GAP SERPL CALC-SCNC: 8 MMOL/L (ref 5–15)
ATRIAL RATE: 75 BPM
BASOPHILS # BLD: 0 K/UL (ref 0–0.1)
BASOPHILS NFR BLD: 0 % (ref 0–1)
BUN SERPL-MCNC: 25 MG/DL (ref 6–20)
BUN/CREAT SERPL: 19 (ref 12–20)
CALCIUM SERPL-MCNC: 8 MG/DL (ref 8.5–10.1)
CALCULATED P AXIS, ECG09: 97 DEGREES
CALCULATED R AXIS, ECG10: -25 DEGREES
CALCULATED T AXIS, ECG11: -4 DEGREES
CHLORIDE SERPL-SCNC: 107 MMOL/L (ref 97–108)
CO2 SERPL-SCNC: 20 MMOL/L (ref 21–32)
CREAT SERPL-MCNC: 1.35 MG/DL (ref 0.55–1.02)
DIAGNOSIS, 93000: NORMAL
DIFFERENTIAL METHOD BLD: ABNORMAL
EOSINOPHIL # BLD: 0.1 K/UL (ref 0–0.4)
EOSINOPHIL NFR BLD: 1 % (ref 0–7)
ERYTHROCYTE [DISTWIDTH] IN BLOOD BY AUTOMATED COUNT: 14.9 % (ref 11.5–14.5)
GLUCOSE SERPL-MCNC: 88 MG/DL (ref 65–100)
HCT VFR BLD AUTO: 32.7 % (ref 35–47)
HGB BLD-MCNC: 10.7 G/DL (ref 11.5–16)
IMM GRANULOCYTES # BLD AUTO: 0 K/UL (ref 0–0.04)
IMM GRANULOCYTES NFR BLD AUTO: 1 % (ref 0–0.5)
LEFT CCA DIST DIAS: 16.3 CM/S
LEFT CCA DIST SYS: 66.5 CM/S
LEFT CCA PROX DIAS: 16.3 CM/S
LEFT CCA PROX SYS: 85.1 CM/S
LEFT ECA DIAS: 1.72 CM/S
LEFT ECA SYS: 104.9 CM/S
LEFT ICA DIST DIAS: 15 CM/S
LEFT ICA DIST SYS: 61.2 CM/S
LEFT ICA MID DIAS: 20.2 CM/S
LEFT ICA MID SYS: 74.5 CM/S
LEFT ICA PROX DIAS: 15 CM/S
LEFT ICA PROX SYS: 69.2 CM/S
LEFT ICA/CCA SYS: 1.12
LEFT VERTEBRAL DIAS: 15.71 CM/S
LEFT VERTEBRAL SYS: 54.2 CM/S
LYMPHOCYTES # BLD: 1 K/UL (ref 0.8–3.5)
LYMPHOCYTES NFR BLD: 20 % (ref 12–49)
MCH RBC QN AUTO: 28.2 PG (ref 26–34)
MCHC RBC AUTO-ENTMCNC: 32.7 G/DL (ref 30–36.5)
MCV RBC AUTO: 86.1 FL (ref 80–99)
MONOCYTES # BLD: 0.8 K/UL (ref 0–1)
MONOCYTES NFR BLD: 15 % (ref 5–13)
NEUTS SEG # BLD: 3.4 K/UL (ref 1.8–8)
NEUTS SEG NFR BLD: 63 % (ref 32–75)
NRBC # BLD: 0 K/UL (ref 0–0.01)
NRBC BLD-RTO: 0 PER 100 WBC
P-R INTERVAL, ECG05: 146 MS
PLATELET # BLD AUTO: 142 K/UL (ref 150–400)
PMV BLD AUTO: 11 FL (ref 8.9–12.9)
POTASSIUM SERPL-SCNC: 3.8 MMOL/L (ref 3.5–5.1)
Q-T INTERVAL, ECG07: 378 MS
QRS DURATION, ECG06: 68 MS
QTC CALCULATION (BEZET), ECG08: 422 MS
RBC # BLD AUTO: 3.8 M/UL (ref 3.8–5.2)
RIGHT CCA DIST DIAS: 12.3 CM/S
RIGHT CCA DIST SYS: 56 CM/S
RIGHT CCA PROX DIAS: 13.5 CM/S
RIGHT CCA PROX SYS: 69.6 CM/S
RIGHT ECA DIAS: 9.66 CM/S
RIGHT ECA SYS: 94.3 CM/S
RIGHT ICA DIST DIAS: 18.7 CM/S
RIGHT ICA DIST SYS: 68.8 CM/S
RIGHT ICA MID DIAS: 12.8 CM/S
RIGHT ICA MID SYS: 51.8 CM/S
RIGHT ICA PROX DIAS: 16.3 CM/S
RIGHT ICA PROX SYS: 85.1 CM/S
RIGHT ICA/CCA SYS: 1.5
RIGHT VERTEBRAL DIAS: 15.96 CM/S
RIGHT VERTEBRAL SYS: 49.1 CM/S
SODIUM SERPL-SCNC: 135 MMOL/L (ref 136–145)
TROPONIN I SERPL-MCNC: <0.05 NG/ML
VENTRICULAR RATE, ECG03: 75 BPM
WBC # BLD AUTO: 5.3 K/UL (ref 3.6–11)

## 2021-02-23 PROCEDURE — 97530 THERAPEUTIC ACTIVITIES: CPT

## 2021-02-23 PROCEDURE — 74011250637 HC RX REV CODE- 250/637: Performed by: HOSPITALIST

## 2021-02-23 PROCEDURE — 93880 EXTRACRANIAL BILAT STUDY: CPT

## 2021-02-23 PROCEDURE — 96374 THER/PROPH/DIAG INJ IV PUSH: CPT

## 2021-02-23 PROCEDURE — 74011250636 HC RX REV CODE- 250/636: Performed by: STUDENT IN AN ORGANIZED HEALTH CARE EDUCATION/TRAINING PROGRAM

## 2021-02-23 PROCEDURE — 80048 BASIC METABOLIC PNL TOTAL CA: CPT

## 2021-02-23 PROCEDURE — 97161 PT EVAL LOW COMPLEX 20 MIN: CPT

## 2021-02-23 PROCEDURE — 74011000258 HC RX REV CODE- 258: Performed by: STUDENT IN AN ORGANIZED HEALTH CARE EDUCATION/TRAINING PROGRAM

## 2021-02-23 PROCEDURE — 94760 N-INVAS EAR/PLS OXIMETRY 1: CPT

## 2021-02-23 PROCEDURE — 99218 HC RM OBSERVATION: CPT

## 2021-02-23 PROCEDURE — 74011250637 HC RX REV CODE- 250/637: Performed by: STUDENT IN AN ORGANIZED HEALTH CARE EDUCATION/TRAINING PROGRAM

## 2021-02-23 PROCEDURE — 84484 ASSAY OF TROPONIN QUANT: CPT

## 2021-02-23 PROCEDURE — 85025 COMPLETE CBC W/AUTO DIFF WBC: CPT

## 2021-02-23 PROCEDURE — 97116 GAIT TRAINING THERAPY: CPT

## 2021-02-23 PROCEDURE — 97535 SELF CARE MNGMENT TRAINING: CPT

## 2021-02-23 PROCEDURE — 97165 OT EVAL LOW COMPLEX 30 MIN: CPT

## 2021-02-23 PROCEDURE — 97166 OT EVAL MOD COMPLEX 45 MIN: CPT

## 2021-02-23 PROCEDURE — 36415 COLL VENOUS BLD VENIPUNCTURE: CPT

## 2021-02-23 RX ORDER — FAMCICLOVIR 250 MG/1
250 TABLET ORAL 3 TIMES DAILY
Status: DISCONTINUED | OUTPATIENT
Start: 2021-02-23 | End: 2021-02-24

## 2021-02-23 RX ORDER — ASCORBIC ACID 500 MG
500 TABLET ORAL DAILY
Status: DISCONTINUED | OUTPATIENT
Start: 2021-02-24 | End: 2021-02-25 | Stop reason: HOSPADM

## 2021-02-23 RX ORDER — PANTOPRAZOLE SODIUM 40 MG/1
40 TABLET, DELAYED RELEASE ORAL
Status: DISCONTINUED | OUTPATIENT
Start: 2021-02-23 | End: 2021-02-25 | Stop reason: HOSPADM

## 2021-02-23 RX ORDER — MULTIVIT WITH MINERALS/HERBS
1 TABLET ORAL DAILY
Status: DISCONTINUED | OUTPATIENT
Start: 2021-02-24 | End: 2021-02-25 | Stop reason: HOSPADM

## 2021-02-23 RX ADMIN — Medication 10 ML: at 06:45

## 2021-02-23 RX ADMIN — SODIUM CHLORIDE 75 ML/HR: 9 INJECTION, SOLUTION INTRAVENOUS at 14:43

## 2021-02-23 RX ADMIN — Medication 10 ML: at 01:20

## 2021-02-23 RX ADMIN — CEFTRIAXONE SODIUM 1 G: 1 INJECTION, POWDER, FOR SOLUTION INTRAMUSCULAR; INTRAVENOUS at 01:19

## 2021-02-23 RX ADMIN — MIRTAZAPINE 7.5 MG: 15 TABLET, FILM COATED ORAL at 21:43

## 2021-02-23 RX ADMIN — Medication 10 ML: at 14:43

## 2021-02-23 RX ADMIN — FAMCICLOVIR 250 MG: 250 TABLET, FILM COATED ORAL at 15:32

## 2021-02-23 RX ADMIN — QUETIAPINE FUMARATE 100 MG: 100 TABLET ORAL at 21:43

## 2021-02-23 RX ADMIN — DONEPEZIL HYDROCHLORIDE 5 MG: 5 TABLET, FILM COATED ORAL at 21:43

## 2021-02-23 RX ADMIN — FAMCICLOVIR 250 MG: 250 TABLET, FILM COATED ORAL at 21:43

## 2021-02-23 RX ADMIN — PANTOPRAZOLE SODIUM 40 MG: 40 TABLET, DELAYED RELEASE ORAL at 15:32

## 2021-02-23 RX ADMIN — Medication 10 ML: at 21:44

## 2021-02-23 RX ADMIN — SODIUM CHLORIDE 75 ML/HR: 9 INJECTION, SOLUTION INTRAVENOUS at 01:01

## 2021-02-23 RX ADMIN — FAMCICLOVIR 250 MG: 250 TABLET, FILM COATED ORAL at 09:15

## 2021-02-23 NOTE — H&P
History & Physical    Primary Care Provider: Meera Gallardo MD  Source of Information: Patient, family and chart review. History of Presenting Illness:   Tanya Isbell is a 80 y.o. female with past medical history of dementia with behavioral disturbance, GERD, hypertension who presents to ER via EMS for concerns of UTI and syncope. History is obtained from patient and family was at bedside. Daughter states patient has history of recurrent UTIs. Current bout of acute cystitis began about 3 weeks ago. Patient has had malaise fatigue and decreased p.o. intake. Additionally she was diagnosed with shingles outbreak 3 days ago and started on antiretroviral medication. Daughter contacted dispatch [facility] who evaluated patient, diagnosed her with a UTI and started her on p.o. antibiotics. Additionally patient was started on famciclovir for shingles outbreak but has been unable to tolerate 500 mg dose. Daughter states she is tolerating half that dose which she has been given to patient. Patient denies any pain at this time. She denies any nausea or vomiting. Denies any dysuria or hematuria. Additionally, patient was noted to have an episode of syncope/unresponsiveness while at home with unclear duration. Daughter states she has had similar episode in the past during a bout of sepsis/cystitis. Recently, daughter who is at bedside states patient does not appear confused and is at her baseline. The patient denies any fever, chills, chest pain, cough, congestion, recent illness, palpitations, or dysuria. On ER presentation, vital signs were overall unremarkable. Labs were remarkable for UA with small blood, large leukocyte esterases and 4+ bacteria, creatinine 1.63. Chest x-ray and CT head were unremarkable. Patient was treated with Rocephin 1 g. Review of Systems:  Pertinent items are noted in the History of Present Illness.      Past Medical History: Diagnosis Date    Dementia     Endocrine disease     adrenal gland issues    Gastrointestinal disorder     C. Diff    Hypertension     Malaria     UTI (lower urinary tract infection)       Past Surgical History:   Procedure Laterality Date    HX ORTHOPAEDIC      ORIF arm     Prior to Admission medications    Medication Sig Start Date End Date Taking? Authorizing Provider   cholecalciferol (VITAMIN D3) 1,000 unit cap Take  by mouth daily. Indications: dose unknown    Provider, Historical   cranberry extract 450 mg tab tablet Take 450 mg by mouth. Provider, Historical   ondansetron (ZOFRAN ODT) 4 mg disintegrating tablet Take 1 Tab by mouth every eight (8) hours as needed for Nausea. 8/18/19   Javan Jackson MD   mirtazapine (REMERON) 7.5 mg tablet Take 7.5 mg by mouth nightly. Provider, Historical   ranitidine (ZANTAC) 75 mg tablet Take 150 mg by mouth two (2) times daily as needed for Indigestion. Provider, Historical   b complex vitamins tablet Take 1 Tab by mouth daily. Provider, Historical   QUEtiapine (SEROQUEL) 100 mg tablet Take 100 mg by mouth nightly. Provider, Historical   L. acidoph & paracasei- S therm- Bifido (VEDA-Q) 8 billion cell cap cap Take 1 Cap by mouth daily. Provider, Historical   donepezil (ARICEPT) 5 mg tablet Take 5 mg by mouth nightly. Provider, Historical   ascorbic acid (VITAMIN C) 500 mg tablet Take 500 mg by mouth daily. Provider, Historical     Allergies   Allergen Reactions    Aspirin Other (comments)     Gi upset    Sulfa (Sulfonamide Antibiotics) Rash    Lidocaine Unknown (comments)     Novocaine    Novocain [Procaine] Unknown (comments)    Penicillins Nausea and Vomiting      History reviewed. No pertinent family history.      SOCIAL HISTORY:  Patient resides:  Independently    Assisted Living    SNF    With family care x      Smoking history:   None x   Former    Chronic      Alcohol history:   None x   Social    Chronic      Ambulates: Independently    w/cane x   w/walker x   w/wc    CODE STATUS:  DNR    Full x   Other      Objective:     Physical Exam:     Visit Vitals  /60   Pulse 81   Temp 98.1 °F (36.7 °C)   Resp 16   SpO2 96%           General:  Alert, cooperative, no distress, appears stated age. Head:  Normocephalic, without obvious abnormality, atraumatic. Eyes:  Conjunctivae/corneas clear. PERRL, EOMs intact. Nose: Nares normal. Septum midline. Mucosa normal.   Throat: Lips, mucosa, and tongue normal.   Neck: Supple, symmetrical, trachea midline, no adenopathy, thyroid: no enlargement/tenderness/nodules, no carotid bruit and no JVD. Back:   Symmetric, no curvature. ROM normal. No CVA tenderness. Lungs:   Clear to auscultation bilaterally. Chest wall:  No tenderness or deformity. Nonvesicular shingle-like lesions on left T12 dermatome. Heart:  Regular rate and rhythm, S1, S2 normal, no murmur, click, rub or gallop. Abdomen:   Soft, non-tender. Bowel sounds normal. No masses,  No organomegaly. Extremities: Extremities normal, atraumatic, no cyanosis or edema. Pulses: 2+ and symmetric all extremities. Skin: Skin color, texture, turgor normal. No rashes or lesions   Neurologic: CNII-XII intact. EKG: Normal sinus rhythm  Data Review:     Recent Days:  Recent Labs     02/22/21 1906   WBC 5.8   HGB 10.4*   HCT 30.4*        Recent Labs     02/22/21  1906   *   K 3.5      CO2 21   *   BUN 31*   CREA 1.63*   CA 8.4*   MG 1.7   ALB 3.0*   ALT 17   INR 1.0     No results for input(s): PH, PCO2, PO2, HCO3, FIO2 in the last 72 hours.     24 Hour Results:  Recent Results (from the past 24 hour(s))   EKG, 12 LEAD, INITIAL    Collection Time: 02/22/21  6:08 PM   Result Value Ref Range    Ventricular Rate 75 BPM    Atrial Rate 75 BPM    P-R Interval 146 ms    QRS Duration 68 ms    Q-T Interval 378 ms    QTC Calculation (Bezet) 422 ms    Calculated P Axis 97 degrees    Calculated R Axis -25 degrees    Calculated T Axis -4 degrees    Diagnosis       Normal sinus rhythm  Nonspecific T wave abnormality  When compared with ECG of 18-AUG-2019 19:24,  No significant change was found     URINALYSIS W/ RFLX MICROSCOPIC    Collection Time: 02/22/21  6:40 PM   Result Value Ref Range    Color DARK YELLOW      Appearance TURBID (A) CLEAR      Specific gravity 1.016 1.003 - 1.030      pH (UA) 5.5 5.0 - 8.0      Protein 30 (A) NEG mg/dL    Glucose Negative NEG mg/dL    Ketone Negative NEG mg/dL    Bilirubin Negative NEG      Blood SMALL (A) NEG      Urobilinogen 0.2 0.2 - 1.0 EU/dL    Nitrites Negative NEG      Leukocyte Esterase LARGE (A) NEG      WBC >100 (H) 0 - 4 /hpf    RBC 5-10 0 - 5 /hpf    Epithelial cells FEW FEW /lpf    Bacteria 4+ (A) NEG /hpf    Hyaline cast 0-2 0 - 5 /lpf   URINE CULTURE HOLD SAMPLE    Collection Time: 02/22/21  6:40 PM    Specimen: Serum   Result Value Ref Range    Urine culture hold        Urine on hold in Microbiology dept for 2 days. If unpreserved urine is submitted, it cannot be used for addtional testing after 24 hours, recollection will be required. CBC WITH AUTOMATED DIFF    Collection Time: 02/22/21  7:06 PM   Result Value Ref Range    WBC 5.8 3.6 - 11.0 K/uL    RBC 3.68 (L) 3.80 - 5.20 M/uL    HGB 10.4 (L) 11.5 - 16.0 g/dL    HCT 30.4 (L) 35.0 - 47.0 %    MCV 82.6 80.0 - 99.0 FL    MCH 28.3 26.0 - 34.0 PG    MCHC 34.2 30.0 - 36.5 g/dL    RDW 14.8 (H) 11.5 - 14.5 %    PLATELET 987 762 - 225 K/uL    MPV 10.8 8.9 - 12.9 FL    NRBC 0.0 0  WBC    ABSOLUTE NRBC 0.00 0.00 - 0.01 K/uL    NEUTROPHILS 67 32 - 75 %    LYMPHOCYTES 16 12 - 49 %    MONOCYTES 15 (H) 5 - 13 %    EOSINOPHILS 1 0 - 7 %    BASOPHILS 0 0 - 1 %    IMMATURE GRANULOCYTES 1 (H) 0.0 - 0.5 %    ABS. NEUTROPHILS 3.9 1.8 - 8.0 K/UL    ABS. LYMPHOCYTES 0.9 0.8 - 3.5 K/UL    ABS. MONOCYTES 0.9 0.0 - 1.0 K/UL    ABS. EOSINOPHILS 0.1 0.0 - 0.4 K/UL    ABS. BASOPHILS 0.0 0.0 - 0.1 K/UL    ABS. IMM.  GRANS. 0.0 0.00 - 0.04 K/UL    DF AUTOMATED     LACTIC ACID    Collection Time: 02/22/21  7:06 PM   Result Value Ref Range    Lactic acid 1.2 0.4 - 2.0 MMOL/L   METABOLIC PANEL, COMPREHENSIVE    Collection Time: 02/22/21  7:06 PM   Result Value Ref Range    Sodium 132 (L) 136 - 145 mmol/L    Potassium 3.5 3.5 - 5.1 mmol/L    Chloride 103 97 - 108 mmol/L    CO2 21 21 - 32 mmol/L    Anion gap 8 5 - 15 mmol/L    Glucose 132 (H) 65 - 100 mg/dL    BUN 31 (H) 6 - 20 MG/DL    Creatinine 1.63 (H) 0.55 - 1.02 MG/DL    BUN/Creatinine ratio 19 12 - 20      GFR est AA 36 (L) >60 ml/min/1.73m2    GFR est non-AA 29 (L) >60 ml/min/1.73m2    Calcium 8.4 (L) 8.5 - 10.1 MG/DL    Bilirubin, total 0.4 0.2 - 1.0 MG/DL    ALT (SGPT) 17 12 - 78 U/L    AST (SGOT) 27 15 - 37 U/L    Alk. phosphatase 94 45 - 117 U/L    Protein, total 7.3 6.4 - 8.2 g/dL    Albumin 3.0 (L) 3.5 - 5.0 g/dL    Globulin 4.3 (H) 2.0 - 4.0 g/dL    A-G Ratio 0.7 (L) 1.1 - 2.2     LIPASE    Collection Time: 02/22/21  7:06 PM   Result Value Ref Range    Lipase 247 73 - 393 U/L   TROPONIN I    Collection Time: 02/22/21  7:06 PM   Result Value Ref Range    Troponin-I, Qt. <0.05 <0.05 ng/mL   MAGNESIUM    Collection Time: 02/22/21  7:06 PM   Result Value Ref Range    Magnesium 1.7 1.6 - 2.4 mg/dL   NT-PRO BNP    Collection Time: 02/22/21  7:06 PM   Result Value Ref Range    NT pro- <450 PG/ML   PROTHROMBIN TIME + INR    Collection Time: 02/22/21  7:06 PM   Result Value Ref Range    INR 1.0 0.9 - 1.1      Prothrombin time 10.9 9.0 - 11.1 sec   SAMPLES BEING HELD    Collection Time: 02/22/21  7:07 PM   Result Value Ref Range    SAMPLES BEING HELD 1RED, 1BC(SLIV)     COMMENT        Add-on orders for these samples will be processed based on acceptable specimen integrity and analyte stability, which may vary by analyte.          Imaging:     Assessment:     Deonte Lutz is a 80 y.o. female with past medical history of dementia with behavioral disturbance, GERD, hypertension who is admitted for acute cystitis and syncope. Plan:       Acute cystitis  -Continue with Rocephin 1 g daily  -Follow-up urine cultures  -Ensure adequate rate hydration    Metabolic encephalopathy  -Transient and now resolved. Per daughter patient is at her baseline.  -Likely multifactorial in setting of acute cystitis and baseline dementia  -Treatment of acute cystitis as above  -Monitor mentation closely for improvement    Syncope  -Difficult to characterize if this was an actual syncopal episode.  -Head CT is unremarkable. -NSR on telemetry.   EKG  -Obtain echo, carotid Dopplers, TSH    Shingles.  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

## 2021-02-23 NOTE — PROGRESS NOTES
[address] [facility]                                                                                          Hospitalist Progress Note  Geetha Chapin MD  Answering service: [phone] OR [phone] from in house phone        Date of Service:  2021  NAME:  Desmond Brewster  :  3/15/1927  MRN:  [id number]    This documentation was facilitated by a Voice Recognition software and may contain inadvertent typographical errors. Admission Summary:   Desmond Brewster is a 80 y.o. female with past medical history of dementia with behavioral disturbance, GERD, hypertension who is admitted for acute cystitis and syncope. Interval history / Subjective:        I have seen and examined patient earlier today, daughter present at the bedside provided most of the history. Patient's past history significant for dizziness/vertigo. According to the daughter, patient was evaluated by ENT and no apparent etiology was identified. Presently, patient sitting the chair by the bedside and denied any complaints including pain. Assessment & Plan:   UTI. Urine culture growing GNR.  -Continue with Rocephin 1 g daily  -Follow-up urine cultures  -Ensure adequate rate hydration     Metabolic encephalopathy  -Transient and now resolved. Per daughter patient is at her baseline.  -Likely multifactorial in setting of acute cystitis and baseline dementia  -Treatment of acute cystitis as above  -Monitor mentation closely for improvement     Syncope in the setting of chronic dizziness most probably precipitated by UTI, dehydration.  -Difficult to characterize if this was an actual syncopal episode.  -Head CT is unremarkable. -NSR on telemetry. EKG  -Carotid Doppler unremarkable  -Patient denied dizziness now.      Shingles diagnosed PTA.  -On day 3 of 7 of famciclovir  -Continue famciclovir at tolerable dose of 250 mg     Dementia /MDD [place] disorder  -Continue home Aricept, Remeron and quetiapine GERD  -Zantac daily           Code status: Full code  DVT prophylaxis: scd  Care Plan discussed with: Patient/Family and Nurse  Anticipated Disposition: tbd   -Likely home with home health. Anticipated Discharge: 24 hours to 48 hours  Hospital Problems  Date Reviewed: 11/6/2019          Codes Class Noted POA    Acute cystitis ICD-10-CM: N30.00  ICD-9-CM: 595.0  2/22/2021 Unknown                Review of Systems:   A comprehensive review of systems was negative except for that written in the HPI. Vital Signs:    Last 24hrs VS reviewed since prior progress note. Most recent are:  Visit Vitals  /72 (BP 1 Location: Right upper arm, BP Patient Position: Sitting)   Pulse 80   Temp 98.3 °F (36.8 °C)   Resp 16   Wt 59.4 kg (130 lb 15.3 oz)   SpO2 96%   BMI 29.36 kg/m²       No intake or output data in the 24 hours ending 02/23/21 1641     Physical Examination:     I had a face to face encounter with this patient and independently examined them on 2/23/2021 as outlined below:        Constitutional:  Alert. Sitting in a chair by the bedside. Not in distress. HEENT:  Atraumatic. Oral mucosa moist,. Non icteric sclera. No pallor. Resp:  CTA bilaterally. No wheezing/rhonchi/rales. No accessory muscle use   Chest Wall: No deformity   CV:  Regular rhythm, normal rate, no murmurs, gallops, rubs    GI:  Soft, non distended, non tender. normoactive bowel sounds, no hepatosplenomegaly    :  No CVA or suprapubic tenderness    Musculoskeletal:  No edema, warm, 2+ pulses throughout  Vesicular lesions, drying on the left infra axillary region    Neurologic:  Mental status: Alert and oriented.   Cranial nerves II-XII : WNL  Motor exam:Moves all extremities symmetrically              Data Review:    Review and/or order of clinical lab test  Review and/or order of tests in the radiology section of CPT  Review and/or order of tests in the medicine section of CPT      Labs:     Recent Labs     02/23/21  0141 02/22/21  1906 WBC 5.3 5.8   HGB 10.7* 10.4*   HCT 32.7* 30.4*   * 157     Recent Labs     02/23/21  0141 02/22/21 1906   * 132*   K 3.8 3.5    103   CO2 20* 21   BUN 25* 31*   CREA 1.35* 1.63*   GLU 88 132*   CA 8.0* 8.4*   MG  --  1.7   PHOS  --  3.0     Recent Labs     02/22/21 1906   ALT 17   AP 94   TBILI 0.4   TP 7.3   ALB 3.0*   GLOB 4.3*   LPSE 247     Recent Labs     02/22/21 1906   INR 1.0   PTP 10.9      No results for input(s): FE, TIBC, PSAT, FERR in the last 72 hours. Lab Results   Component Value Date/Time    Folate 75.0 (H) 02/22/2021 07:06 PM      No results for input(s): PH, PCO2, PO2 in the last 72 hours.   Recent Labs     02/23/21  0152 02/22/21 1906   TROIQ <0.05 <0.05     No results found for: CHOL, CHOLX, CHLST, CHOLV, HDL, HDLP, LDL, LDLC, DLDLP, TGLX, TRIGL, TRIGP, CHHD, CHHDX  No results found for: [facility]  Lab Results   Component Value Date/Time    Color DARK YELLOW 02/22/2021 06:40 PM    Appearance TURBID (A) 02/22/2021 06:40 PM    Specific gravity 1.016 02/22/2021 06:40 PM    pH (UA) 5.5 02/22/2021 06:40 PM    Protein 30 (A) 02/22/2021 06:40 PM    Glucose Negative 02/22/2021 06:40 PM    Ketone Negative 02/22/2021 06:40 PM    Bilirubin Negative 02/22/2021 06:40 PM    Urobilinogen 0.2 02/22/2021 06:40 PM    Nitrites Negative 02/22/2021 06:40 PM    Leukocyte Esterase LARGE (A) 02/22/2021 06:40 PM    Epithelial cells FEW 02/22/2021 06:40 PM    Bacteria 4+ (A) 02/22/2021 06:40 PM    WBC >100 (H) 02/22/2021 06:40 PM    RBC 5-10 02/22/2021 06:40 PM         Medications Reviewed:     Current Facility-Administered Medications   Medication Dose Route Frequency    famciclovir (FAMVIR) tablet 250 mg  250 mg Oral TID    pantoprazole (PROTONIX) tablet 40 mg  40 mg Oral ACB&D    [START ON 2/24/2021] ascorbic acid (vitamin C) (VITAMIN C) tablet 500 mg  500 mg Oral DAILY    [START ON 2/24/2021] vitamin B complex tablet  1 Tab Oral DAILY    [START ON 2/24/2021] L.acidophilus-paracasei-S.thermophil-bifidobacter (RISAQUAD) 8 billion cell capsule  1 Cap Oral DAILY    donepeziL (ARICEPT) tablet 5 mg  5 mg Oral QHS    mirtazapine (REMERON) tablet 7.5 mg  7.5 mg Oral QHS    QUEtiapine (SEROquel) tablet 100 mg  100 mg Oral QHS    famotidine (PEPCID) tablet 20 mg  20 mg Oral DAILY PRN    sodium chloride (NS) flush 5-40 mL  5-40 mL IntraVENous Q8H    sodium chloride (NS) flush 5-40 mL  5-40 mL IntraVENous PRN    acetaminophen (TYLENOL) tablet 650 mg  650 mg Oral Q6H PRN    Or    acetaminophen (TYLENOL) suppository 650 mg  650 mg Rectal Q6H PRN    polyethylene glycol (MIRALAX) packet 17 g  17 g Oral DAILY PRN    promethazine (PHENERGAN) tablet 12.5 mg  12.5 mg Oral Q6H PRN    Or    ondansetron (ZOFRAN) injection 4 mg  4 mg IntraVENous Q6H PRN    0.9% sodium chloride infusion  75 mL/hr IntraVENous CONTINUOUS    cefTRIAXone (ROCEPHIN) 1 g in 0.9% sodium chloride (MBP/ADV) 50 mL MBP  1 g IntraVENous Q24H     ______________________________________________________________________  EXPECTED LENGTH OF STAY: - - -  ACTUAL LENGTH OF STAY:          0                 Perico Downey MD

## 2021-02-23 NOTE — PROGRESS NOTES
TRANSITIONS OF CARE PLAN:   1. DESTINATION: TBD: potentially family home  2. TRANSPORT: family  3. ADDITIONAL SUPPORT: 2 daughters, son in law  3. DME: cane, walker, bedside commode, shower stool, bp cuff  5. HOME HEALTH: TBD  6. CODE STATUS/AMD STATUS: Full Code;  on file  7. FOLLOW UP APPOINTMENTS: PCP  8. STILL NEEDS: ABX, cultures, Echo, Dopplers, Airborne precautions    Reason for Admission:   Acute Cystitis                   RUR Score:     N/A; OBS - RRAT: 18             PCP: First and Last name:  Bryan Washington   Name of Practice:        Are you a current patient: Yes/No: yes   Approximate date of last visit: 2/2   Can you participate in a virtual visit if needed: no    Do you (patient/family) have any concerns for transition/discharge? None expressed - slight decline in functioning status but family assists                  Plan for utilizing home health:   TBD    Current Advanced Directive/Advance Care Plan:  Full Code; on file    Healthcare Decision Maker: Citlaly Farooq here to complete [employer] including selection of the Healthcare Decision Maker Relationship (ie \"Primary\")            Transition of Care Plan:     CM spoke with daughterMacy, by phone, as patient is on airborne precautions. Patient is [place] speaking only with baseline dementia. Patient lives with daughter and son in law in a single story home, 2 exterior steps. Daughter assists in ADLs as needed and transports patient. Patient has hx of Rehab with [facility] and hx of HH with [facility]. Pharmacy preference is [facility]. Disposition is TBD dependent on progression. Observation notice provided in writing to patient and/or caregiver as well as verbal explanation of the policy. Patients who are in outpatient status also receive the Observation notice.       Care Management Interventions  PCP Verified by CM: Yes(followed by Dr. Charley Apley)  Last Visit to PCP: 02/02/21  Palliative Care Criteria Met (RRAT>21 & CHF Dx)?: No  Mode of Transport at Discharge:  Other (see comment)(family)  Transition of Care Consult (CM Consult): Discharge Planning  MyChart Signup: No  Discharge Durable Medical Equipment: No(has cane, walker, bedside commode, shower stool, bp cuff)  Health Maintenance Reviewed: Yes(cm spoke with patient's daughter by phone)  Physical Therapy Consult: Yes  Occupational Therapy Consult: Yes  Speech Therapy Consult: No  Current Support Network: Own Home, Family Lives Nearby  Confirm Follow Up Transport: Family(family assists with ADLs as needed; family transports)  [facility] Provided?: No  Discharge Location  Discharge Placement: Unable to determine at this time(lives with daughter and son in law in a single story home, 2 exterior steps)  CRM: Dimitri Napier, MPH,  Johnas Margi; Z: [phone]

## 2021-02-23 NOTE — PROGRESS NOTES
Problem: Self Care Deficits Care Plan (Adult)  Goal: *Acute Goals and Plan of Care (Insert Text)  Description:   FUNCTIONAL STATUS PRIOR TO ADMISSION: Patient was modified independent using a rollator and single point cane for functional mobility with daughter with S will all ambulatory tasks. Patient required moderate assistance to S for basic and instrumental ADLs with care provided by daughter, [facility] used beside bed for night use, mentation/orientation fluctuates per daughter due to dementia. HOME SUPPORT: The patient lived daughter and family/ with mod-S to provide assistance due to dementia. Occupational Therapy Goals  Initiated 2/23/2021  1. Patient will perform grooming standing at sink with supervision/set-up within 7 day(s). 2.  Patient will perform bathing with moderate assistance  within 7 day(s). 3.  Patient will perform lower body dressing with minimal assistance/contact guard assist within 7 day(s). 4.  Patient will perform toilet transfers with supervision/set-up within 7 day(s). 5.  Patient will perform all aspects of toileting with supervision/set-up within 7 day(s). 6.  Patient will participate in upper extremity therapeutic exercise/activities with supervision/set-up for 5 minutes within 7 day(s). Outcome: Progressing Towards Goal   OCCUPATIONAL THERAPY EVALUATION  Patient: Meryl Jacobsen (26 y.o. female)  Date: 2/23/2021  Primary Diagnosis: Acute cystitis [N30.00]        Precautions: Speaks [place],  Fall    ASSESSMENT  Based on the objective data described below, the patient presents with impaired orientation (oriented to person and place only), impaired balance, sensitivity to touch/pressure, L outside ear lobe pain, dizziness upon sitting and standing but none seated, stable vitals with mobility and near baseline for ADLs and functional mobility with HHA x1 with 1 LOB with self correction.  Daughter states dizziness has comes/goes for over 2 years with full workups completed with no cause known, now hospitalization for syncope episode at home now with cystitis and shingles. Recommend home with [facility] OT.    Current Level of Function Impacting Discharge (ADLs/self-care): mod A for LB ADLs, setup to min A for UB ADLs, HHA for ambulation in room    Functional Outcome Measure: The patient scored Total: 55/100 on the Barthel Index outcome measure which is indicative of 45% impaired ability to care for basic self needs/dependency on others; inferred 100% dependency on others for instrumental ADLs. Other factors to consider for discharge: [facility] and f[facility]erick [place], daughter full time caregiver for her and patient's , wishes to take her back home     Patient will benefit from skilled therapy intervention to address the above noted impairments. PLAN :  Recommendations and Planned Interventions: self care training, functional mobility training, therapeutic exercise, balance training, therapeutic activities, endurance activities, patient education, and family training/education    Frequency/Duration: Patient will be followed by occupational therapy 3 times a week to address goals. Recommendation for discharge: (in order for the patient to meet his/her long term goals)  Occupational therapy at least 2 days/week in the home AND ensure assist and/or supervision for safety with ADLs    This discharge recommendation:  A follow-up discussion with the attending provider and/or case management is planned    IF patient discharges home will need the following DME: has all needed DME- [facility] used beside bed at night       SUBJECTIVE:   Patient stated do you look like your father? Rabia Mabry Re: pleasantly confused- patient speaking in [place] to family member asking if the 2 therapists were related \"mother and daughter\" and if I look like my father.      OBJECTIVE DATA SUMMARY:   HISTORY:   Past Medical History:   Diagnosis Date    Dementia     Endocrine disease     adrenal gland issues    Gastrointestinal disorder     C. Diff    Hypertension     Malaria     UTI (lower urinary tract infection)      Past Surgical History:   Procedure Laterality Date    HX ORTHOPAEDIC      ORIF arm       Expanded or extensive additional review of patient history:     Home Situation  Home Environment: Private residence  # Steps to Enter: 2(uses cane and hand held assist)  One/Two Story Residence: One story  Living Alone: No  Support Systems: Child(asha)  Patient Expects to be Discharged to[de-identified] Private residence  Current DME Used/Available at Home: Camargo Sinning, straight, Walker, rollator  Tub or Shower Type: Shower    Hand dominance: Right    EXAMINATION OF PERFORMANCE DEFICITS:  Cognitive/Behavioral Status:  Neurologic State: Alert  Orientation Level: Oriented to person;Oriented to place; Disoriented to situation;Disoriented to time(interpretation through daughter)  Cognition: Memory loss; Follows commands        Safety/Judgement: Decreased awareness of environment;Decreased awareness of need for assistance;Decreased awareness of need for safety;Decreased insight into deficits    Skin: intact    Edema: none noted    Hearing: Auditory  Auditory Impairment: None    Vision/Perceptual:            Not formally tested, able to locate various items on tray and during mobility with furniture reaching                         Range of Motion:  B UE  AROM: Within functional limits                         Strength:  B UE 4/5  Strength: Generally decreased, functional                Coordination:  Coordination: Within functional limits  Fine Motor Skills-Upper: Left Intact; Right Intact         Tone & Sensation:  B UE intact                            Balance:  Sitting: Intact; With support  Standing: Impaired; With support  Standing - Static: Good  Standing - Dynamic : Fair    Functional Mobility and Transfers for ADLs:  Bed Mobility:  Supine to Sit: Supervision    Transfers:  Sit to Stand: Minimum assistance;Assist x1(HHA)  Stand to Sit: Supervision  Bed to Chair: Minimum assistance; Additional time;Assist x1(HHA, 1 LOB)  Bathroom Mobility: Minimum assistance(HHA to [facility] only)  Toilet Transfer : Stand-by assistance    ADL Assessment:  Feeding: Setup    Oral Facial Hygiene/Grooming: Setup    Bathing: Moderate assistance; Additional time    Upper Body Dressing: Setup    Lower Body Dressing: Moderate assistance    Toileting: Contact guard assistance(for balance, otherwise setup/S)      Completed OT evaluation and ADLs seated EOB and standing as able with HHA for balance. Educated on safety and endurance training with encouragement for full participation in ADLs while in hospital. Radha Zhang understanding noted. ADL Intervention and task modifications:  Feeding  Drink to Mouth: Set-up     Completed orthostatic BP testing, and then transferred to [facility] to L side of patient with HHA, 1 step command given with visual cues due to language barrier, daughter translating during session. Patient required HHA at times for balance, has been using SPC mainly in home but does use rollator when need and for longer distances/community. Toileting  Bladder Hygiene: Set-up  Clothing Management: Set-up    Cognitive Retraining  Safety/Judgement: Decreased awareness of environment;Decreased awareness of need for assistance;Decreased awareness of need for safety;Decreased insight into deficits    Therapeutic Exercise:     Functional Measure:  Barthel Index:    Bathin  Bladder: 10  Bowels: 10  Groomin  Dressin  Feeding: 10  Mobility: 0  Stairs: 0  Toilet Use: 5  Transfer (Bed to Chair and Back): 10  Total: 55/100        The Barthel ADL Index: Guidelines  1. The index should be used as a record of what a patient does, not as a record of what a patient could do. 2. The main aim is to establish degree of independence from any help, physical or verbal, however minor and for whatever reason.   3. The need for supervision renders the patient not independent. 4. A patient's performance should be established using the best available evidence. Asking the patient, friends/relatives and nurses are the usual sources, but direct observation and common sense are also important. However direct testing is not needed. 5. Usually the patient's performance over the preceding 24-48 hours is important, but occasionally longer periods will be relevant. 6. Middle categories imply that the patient supplies over 50 per cent of the effort. 7. Use of aids to be independent is allowed. Navya Damico., Barthel, DHunterW. (3011). Functional evaluation: the Barthel Index. [address] [facility] (14)2. Rick Burnham demetra EDIN John, Anthony Matthews., Anitra Hull., [place], [address] (1999). Measuring the change indisability after inpatient rehabilitation; comparison of the responsiveness of the Barthel Index and Functional [place] Measure. Journal of Neurology, Neurosurgery, and Psychiatry, 66(4), 066-000. Courtney Salinas, N.J.A, LISSETTE Blankenship, & Sky Ornelas, M.A. (2004.) Assessment of post-stroke quality of life in cost-effectiveness studies: The usefulness of the Barthel Index and the EuroQoL-5D. Quality of Life Research, 15, 971-52         Occupational Therapy Evaluation Charge Determination   History Examination Decision-Making   MEDIUM Complexity : Expanded review of history including physical, cognitive and psychosocial  history  MEDIUM Complexity : 3-5 performance deficits relating to physical, cognitive , or psychosocial skils that result in activity limitations and / or participation restrictions MEDIUM Complexity : Patient may present with comorbidities that affect occupational performnce.  Miniml to moderate modification of tasks or assistance (eg, physical or verbal ) with assesment(s) is necessary to enable patient to complete evaluation       Based on the above components, the patient evaluation is determined to be of the following complexity level: MEDIUM  Pain Rating:  Discomfort with BP readings, however no pain during rest per daughter/translation    Activity Tolerance:   Fair, tolerates ADLs without rest breaks, and SpO2 stable on RA    After treatment patient left in no apparent distress:    Sitting in chair, Call bell within reach, Bed / chair alarm activated, and Caregiver / family present    COMMUNICATION/EDUCATION:   The patients plan of care was discussed with: Physical therapist and Registered nurse. Home safety education was provided and the patient/caregiver indicated understanding., Patient/family have participated as able in goal setting and plan of care. , and Patient/family agree to work toward stated goals and plan of care. This patients plan of care is appropriate for delegation to [facility].     Thank you for this referral.  Jose Enrique Molina, BIENVENIDO  Time Calculation: 43 mins

## 2021-02-23 NOTE — ED PROVIDER NOTES
79-year-old female with a history of HTN, dementia, prior C. difficile, presents to the emergency department by EMS with history of diagnosis of UTI earlier today by dispatch health provider and Rx'd Cipro which she has taken 1 dose of. Approximately 45 minutes prior to arrival the patient reportedly became very lightheaded and nauseous and had a witnessed syncopal episode lasting for about 2 to 3 minutes while sitting in a chair. This episode did not seem to be associated with orthostatic position change. She did not report any preceding chest pain or shortness of breath that history is very limited secondary to underlying dementia. She seemed to gradually return back to her baseline mental status after a few minutes and had no witnessed seizure type activity. No history of seizures. Her family states that she has significant difficulty tolerating any medications by mouth as it seems to trigger prominent nausea and vomiting and other side effects. She seems to be more confused than her baseline as well per the patient's daughter. Patient speaks [place] and does not speak [place]. The history is provided by the patient and a relative. The history is limited by a language barrier. A  was used. Past Medical History:   Diagnosis Date    Dementia     Endocrine disease     adrenal gland issues    Gastrointestinal disorder     C. Diff    Hypertension     Malaria     UTI (lower urinary tract infection)        Past Surgical History:   Procedure Laterality Date    HX ORTHOPAEDIC      ORIF arm         History reviewed. No pertinent family history.     Social History     Socioeconomic History    Marital status: UNKNOWN     Spouse name: Not on file    Number of children: Not on file    Years of education: Not on file    Highest education level: Not on file   Occupational History    Not on file   Social Needs    Financial resource strain: Not on file    Food insecurity Worry: Not on file     Inability: Not on file    Transportation needs     Medical: Not on file     Non-medical: Not on file   Tobacco Use    Smoking status: Never Smoker    Smokeless tobacco: Never Used   Substance and Sexual Activity    Alcohol use: No    Drug use: No    Sexual activity: Never   Lifestyle    Physical activity     Days per week: Not on file     Minutes per session: Not on file    Stress: Not on file   Relationships    Social connections     Talks on phone: Not on file     Gets together: Not on file     Attends Anglican service: Not on file     Active member of club or organization: Not on file     Attends meetings of clubs or organizations: Not on file     Relationship status: Not on file    Intimate partner violence     Fear of current or ex partner: Not on file     Emotionally abused: Not on file     Physically abused: Not on file     Forced sexual activity: Not on file   Other Topics Concern    Not on file   Social History Narrative    Not on file         ALLERGIES: Aspirin, Sulfa (sulfonamide antibiotics), Lidocaine, Novocain [procaine], and Penicillins    Review of Systems   Unable to perform ROS: Dementia       Vitals:    02/22/21 1726   BP: 125/60   Pulse: 81   Resp: 16   Temp: 98.1 °F (36.7 °C)   SpO2: 96%            Physical Exam  Vitals signs and nursing note reviewed. Constitutional:       General: She is not in acute distress. Appearance: Normal appearance. She is well-developed. She is not diaphoretic. HENT:      Head: Normocephalic and atraumatic. Nose: Nose normal.   Eyes:      Extraocular Movements: Extraocular movements intact. Conjunctiva/sclera: Conjunctivae normal.      Pupils: Pupils are equal, round, and reactive to light. Neck:      Musculoskeletal: Neck supple. Cardiovascular:      Rate and Rhythm: Normal rate and regular rhythm. Heart sounds: Normal heart sounds.    Pulmonary:      Effort: Pulmonary effort is normal.      Breath sounds: Normal breath sounds. Abdominal:      General: There is no distension. Palpations: Abdomen is soft. Tenderness: There is no abdominal tenderness. Musculoskeletal:         General: No tenderness. Skin:     General: Skin is warm and dry. Neurological:      General: No focal deficit present. Mental Status: She is alert. She is disoriented. Cranial Nerves: No cranial nerve deficit. Sensory: No sensory deficit. Motor: No weakness. Coordination: Coordination normal.          MDM   31-year-old female presents with UTI, syncope, and reported increased confusion from baseline dementia. She is afebrile with vital signs stable    Labs returned showing no leukocytosis, normal lactic acid, elevated creatinine at 1.63, BUN 31, negative troponin. CT head shows no acute intracranial abnormalities. CXR viewed by myself and read by radiology showing no acute abnormalities. UA returned showing significant urinary tract infection. Given IV Rocephin and will admit to the hospitalist service for further care and assessment. Procedures    1808 EKG shows normal sinus rhythm with a rate of 75 bpm with T wave flattening inferolaterally but no ST elevation or depression. Perfect Serve Consult for Admission  8:29 PM    ED Room Number: IK94/68  Patient Name and age:  Cristal Del Angel 80 y.o.  female  Working Diagnosis:   1. Urinary tract infection with hematuria, site unspecified    2. Syncope and collapse    3. Confusion        COVID-19 Suspicion:  no  Sepsis present:  no  Reassessment needed: no  Code Status:  Full Code  Readmission: no  Isolation Requirements:  no  Recommended Level of Care:  med/surg  Department:[facility] Adult ED - 21   Other: UTI with increased confusion and episode of syncope prior to arrival.  Afebrile with vital signs stable here.   Getting IV Rocephin

## 2021-02-23 NOTE — PROGRESS NOTES
Admission Medication Reconciliation:    Information obtained from:  Patient's daughter   RxQuery data available¹:  NO    Comments/Recommendations: Updated PTA meds/reviewed patient's allergies. 1)  Medication list reviewed with caregiver, no changes at this time   [facility] benefit data reflects medications filled and processed through the patient's insurance, however   this data does NOT capture whether the medication was picked up or is currently being taken by the patient. Allergies:  Aspirin, Sulfa (sulfonamide antibiotics), Lidocaine, Novocain [procaine], and Penicillins    Significant PMH/Disease States:   Past Medical History:   Diagnosis Date    Dementia     Endocrine disease     adrenal gland issues    Gastrointestinal disorder     C. Diff    Hypertension     Malaria     UTI (lower urinary tract infection)      Chief Complaint for this Admission:  No chief complaint on file. Prior to Admission Medications:   Prior to Admission Medications   Prescriptions Last Dose Informant Taking? L. acidoph & paracasei- S therm- Bifido (VEDA-Q) 8 billion cell cap cap   Yes   Sig: Take 1 Cap by mouth daily. QUEtiapine (SEROQUEL) 100 mg tablet   Yes   Sig: Take 100 mg by mouth nightly. ascorbic acid (VITAMIN C) 500 mg tablet   Yes   Sig: Take 500 mg by mouth daily. b complex vitamins tablet   Yes   Sig: Take 1 Tab by mouth daily. cholecalciferol (VITAMIN D3) 1,000 unit cap   Yes   Sig: Take  by mouth daily. Indications: dose unknown   cranberry extract 450 mg tab tablet   Yes   Sig: Take 450 mg by mouth. donepezil (ARICEPT) 5 mg tablet   Yes   Sig: Take 5 mg by mouth nightly. mirtazapine (REMERON) 7.5 mg tablet   Yes   Sig: Take 7.5 mg by mouth nightly. ondansetron (ZOFRAN ODT) 4 mg disintegrating tablet   Yes   Sig: Take 1 Tab by mouth every eight (8) hours as needed for Nausea.    ranitidine (ZANTAC) 75 mg tablet   Yes   Sig: Take 150 mg by mouth two (2) times daily as needed for Indigestion. Facility-Administered Medications: None     Please contact the main inpatient pharmacy with any questions or concerns at [phone] and we will direct you to the clinical pharmacist covering this patient's care while in-house.    Hernán Ng, PHARMD

## 2021-02-23 NOTE — ACP (ADVANCE CARE PLANNING)
[address] [facility] Adult  Hospitalist Group                                Advance Care Planning Note    Name: Desmond Brewster  YOB: 1927  MRN: [id number]  Admission Date: 2/22/2021  5:01 PM    Date of discussion: 2/23/2021    Active Diagnoses:    Hospital Problems  Date Reviewed: 11/6/2019          Codes Class Noted POA    Acute cystitis ICD-10-CM: N30.00  ICD-9-CM: 595.0  2/22/2021 Unknown              These active diagnoses are of sufficient risk that focused discussion on advance care planning is indicated in order to allow the patient to thoughtfully consider personal goals of care, and if situations arise that prevent the ability to personally give input, to ensure appropriate representation of their personal desires for different levels and aggressiveness of care. Discussion:     Persons present and participating in discussion: Desmond Brewster, Bira Sahni MD    Topics Discussed:  Patient's medical condition and diagnosis: [ x ] yes [  ] no   Surrogate decision maker: [x  ] yes [  ] no   Patient's current physical function/cognitive function/frailty: [ x ] yes [  ] no   Code Status: [ x ] yes [  ] no   Artificial Nutrition / Dialysis / Non-Invasive Ventilation / Blood Transfusion: [ x ] yes [  ] no  Potential Resources for home (durable medical equipment, home nursing, home O2): [ x ] yes [  ] no    Overview of Discussion:   After noticing that patient had no advance CARE documentation, I had discussion with patient and her daughter who is POA. Daughter states this is not a conversation that she and her family had had. She had several questions which were answered. I explained importance of having documentation of POA and of patient's wishes especially given her mild dementia and potential for worsening dementia which would exclude her from any decision making. She explained at this time that family wants patient full code.   We discussed implications of a full code, DNR and partial code. She inquired about process of resuscitation and this was explained to her in great depth. She inquired about her chances and survivability of CPR given patient's age and comorbidities. I explained to POA that although it was difficult to ascertain, chances of survival and quality of life generally will decline with advanced age and comorbidities and that this would be something to consider in code discussion. Daughter expressed full understanding of conversation and plans to have a conversation with family concerning her mother's disposition. At this time she wants patient to remain full code until this conversation is completed. Time Spent:     Total time spent face-to-face in education and discussion: 18 minutes.      Arnold Flannery MD  Date of Service:  2/23/2021  7:59 AM

## 2021-02-23 NOTE — PROGRESS NOTES
Orders received, chart reviewed and patient evaluated by physical therapy. Pending progression with skilled acute physical therapy, recommend:  Physical therapy at least 2 days/week in the home     Recommend with nursing OOB to chair 3x/day and walking daily with 1 person assist in room -using gait belt and hand held assist. Thank you for completing as able in order to maintain patient strength, endurance and independence. Full evaluation to follow.    Sarah Cook, PT

## 2021-02-24 ENCOUNTER — APPOINTMENT (OUTPATIENT)
Dept: NON INVASIVE DIAGNOSTICS | Age: 86
End: 2021-02-24
Attending: STUDENT IN AN ORGANIZED HEALTH CARE EDUCATION/TRAINING PROGRAM
Payer: MEDICARE

## 2021-02-24 LAB
ANION GAP SERPL CALC-SCNC: 6 MMOL/L (ref 5–15)
BUN SERPL-MCNC: 13 MG/DL (ref 6–20)
BUN/CREAT SERPL: 13 (ref 12–20)
CALCIUM SERPL-MCNC: 7.9 MG/DL (ref 8.5–10.1)
CHLORIDE SERPL-SCNC: 111 MMOL/L (ref 97–108)
CO2 SERPL-SCNC: 22 MMOL/L (ref 21–32)
CREAT SERPL-MCNC: 1 MG/DL (ref 0.55–1.02)
GLUCOSE SERPL-MCNC: 85 MG/DL (ref 65–100)
POTASSIUM SERPL-SCNC: 3.7 MMOL/L (ref 3.5–5.1)
SODIUM SERPL-SCNC: 139 MMOL/L (ref 136–145)

## 2021-02-24 PROCEDURE — 36415 COLL VENOUS BLD VENIPUNCTURE: CPT

## 2021-02-24 PROCEDURE — 74011250637 HC RX REV CODE- 250/637: Performed by: STUDENT IN AN ORGANIZED HEALTH CARE EDUCATION/TRAINING PROGRAM

## 2021-02-24 PROCEDURE — 74011250637 HC RX REV CODE- 250/637: Performed by: HOSPITALIST

## 2021-02-24 PROCEDURE — 96376 TX/PRO/DX INJ SAME DRUG ADON: CPT

## 2021-02-24 PROCEDURE — 74011250636 HC RX REV CODE- 250/636: Performed by: STUDENT IN AN ORGANIZED HEALTH CARE EDUCATION/TRAINING PROGRAM

## 2021-02-24 PROCEDURE — 74011000258 HC RX REV CODE- 258: Performed by: STUDENT IN AN ORGANIZED HEALTH CARE EDUCATION/TRAINING PROGRAM

## 2021-02-24 PROCEDURE — 74011250636 HC RX REV CODE- 250/636: Performed by: HOSPITALIST

## 2021-02-24 PROCEDURE — 99218 HC RM OBSERVATION: CPT

## 2021-02-24 PROCEDURE — 80048 BASIC METABOLIC PNL TOTAL CA: CPT

## 2021-02-24 RX ORDER — FAMCICLOVIR 250 MG/1
500 TABLET ORAL EVERY 24 HOURS
Status: DISCONTINUED | OUTPATIENT
Start: 2021-02-25 | End: 2021-02-25 | Stop reason: HOSPADM

## 2021-02-24 RX ADMIN — PANTOPRAZOLE SODIUM 40 MG: 40 TABLET, DELAYED RELEASE ORAL at 17:18

## 2021-02-24 RX ADMIN — CEFTRIAXONE SODIUM 1 G: 1 INJECTION, POWDER, FOR SOLUTION INTRAMUSCULAR; INTRAVENOUS at 00:00

## 2021-02-24 RX ADMIN — PANTOPRAZOLE SODIUM 40 MG: 40 TABLET, DELAYED RELEASE ORAL at 07:00

## 2021-02-24 RX ADMIN — SODIUM CHLORIDE 75 ML/HR: 9 INJECTION, SOLUTION INTRAVENOUS at 07:00

## 2021-02-24 RX ADMIN — Medication 10 ML: at 17:18

## 2021-02-24 RX ADMIN — Medication 1 CAPSULE: at 10:15

## 2021-02-24 RX ADMIN — QUETIAPINE FUMARATE 100 MG: 100 TABLET ORAL at 21:13

## 2021-02-24 RX ADMIN — FAMCICLOVIR 250 MG: 250 TABLET, FILM COATED ORAL at 11:09

## 2021-02-24 RX ADMIN — SODIUM CHLORIDE 100 ML/HR: 9 INJECTION, SOLUTION INTRAVENOUS at 23:26

## 2021-02-24 RX ADMIN — Medication 10 ML: at 07:00

## 2021-02-24 RX ADMIN — MIRTAZAPINE 7.5 MG: 15 TABLET, FILM COATED ORAL at 21:13

## 2021-02-24 RX ADMIN — Medication 10 ML: at 21:14

## 2021-02-24 RX ADMIN — CEFTRIAXONE SODIUM 1 G: 1 INJECTION, POWDER, FOR SOLUTION INTRAMUSCULAR; INTRAVENOUS at 23:26

## 2021-02-24 RX ADMIN — OXYCODONE HYDROCHLORIDE AND ACETAMINOPHEN 500 MG: 500 TABLET ORAL at 10:15

## 2021-02-24 RX ADMIN — Medication 1 TABLET: at 10:15

## 2021-02-24 RX ADMIN — DONEPEZIL HYDROCHLORIDE 5 MG: 5 TABLET, FILM COATED ORAL at 21:13

## 2021-02-24 NOTE — PROGRESS NOTES
BRITTNEY:  Home with home health services. [facility] can accept and aware of discharge date tomorrow 2/25. Updated aVS.     CM called daughter Cheyenne Farooq to review anticipated discharge tomorrow and DOMENICA AGRAWAL [facility] cannot accept. She agreed to have CM locate another agency. She had no further preferences. Faxed referral to [facility] fax [phone]. Awaiting confirmation. Arabella Mcdowell, MSW     4:30pm  CM called [facility] and refaxed referral for their review.     Anthony Garcia MSW

## 2021-02-24 NOTE — PROGRESS NOTES
2/24/2021 -   BRITTNEY:  - RUR: N/A; OBS - RRAT: 18  - Disposition is TBD: potential discharge to own home with transport via family    - Cultures pending  - ABX continue    09:00 -   CM contacted patient's daughter/Elly Falcon: [phone]) to discuss [facility] recommendation. Daughter is in agreement and due to hx with the agency selected [facility]. CM submitted referral via CC. Transition of Care Plan:     The Plan for Transition of Care is related to the following treatment goals: Home with [facility]    The Patient and/or patient representative daughter Joya Salas was provided with a choice of provider and agrees  with the discharge plan. Yes [x] No []    A Freedom of choice list was provided with basic dialogue that supports the patient's individualized plan of care/goals and shares the quality data associated with the providers.        Yes [x] No []    CRM: Marito Segura, MPH, [address] [place] [address]; Z: [phone]

## 2021-02-24 NOTE — PROGRESS NOTES
Bedside shift change report given to Earl Finn RN by [facility] RN. Report included the following information SBAR, Kardex, Intake/Output, MAR and Cardiac Rhythm NSR.

## 2021-02-24 NOTE — PROGRESS NOTES
[facility] Adult  Hospitalist Group                                                                                          Hospitalist Progress Note  Jose Campos MD  Answering service: [phone] OR [phone] from in house phone        Date of Service:  2021  NAME:  Daniela Harris  :  3/15/1927  MRN:  [id number]    This documentation was facilitated by a Voice Recognition software and may contain inadvertent typographical errors. Admission Summary:   Daniela Harris is a 80 y.o. female with past medical history of dementia with behavioral disturbance, GERD, hypertension who is admitted for acute cystitis and syncope. Interval history / Subjective:        I have seen and examined patient, daughter at the bedside. Patient lying in bed, on room air. She says she is not feeling well today without, she cannot elaborate, she denies pain. Per daughter, patient is not eating and drinking much. Assessment & Plan:   UTI. Urine culture growing GNR.  -Continue with Rocephin 1 g daily  -Follow-up urine cultures  -Ensure adequate rate hydration     Metabolic encephalopathy  -Transient and now resolved. Per daughter patient is at her baseline.  -Likely multifactorial in setting of acute cystitis and baseline dementia  -Treatment of acute cystitis as above  -Monitor mentation closely for improvement     Syncope in the setting of chronic dizziness most probably precipitated by UTI, dehydration.  -Difficult to characterize if this was an actual syncopal episode.  -Head CT is unremarkable. -NSR on telemetry. EKG  -Carotid Doppler unremarkable  -Patient denied dizziness now. Shingles diagnosed PTA.  -On  famciclovir  -Continue famciclovir at tolerable dose of 250 mg     Dementia /MDD Leticia Songster disorder  -Continue home Aricept, Remeron and quetiapine     GERD  -PPI.            Code status: Full code  DVT prophylaxis: scd  Care Plan discussed with: Patient/Family and Nurse  Anticipated Disposition: tbd   -Likely home with home health. -Patient says she is not feeling well today, would like to watch her today and possibly discharge tomorrow  Anticipated Discharge: 24 hours to 48 hours  Hospital Problems  Date Reviewed: 11/6/2019          Codes Class Noted POA    Acute cystitis ICD-10-CM: N30.00  ICD-9-CM: 595.0  2/22/2021 Unknown                Review of Systems:   A comprehensive review of systems was negative except for that written in the HPI. Vital Signs:    Last 24hrs VS reviewed since prior progress note. Most recent are:  Visit Vitals  BP (!) 108/58 (BP 1 Location: Right arm)   Pulse 79   Temp 98.7 °F (37.1 °C)   Resp 16   Wt 57.4 kg (126 lb 8.7 oz)   SpO2 93%   BMI 28.37 kg/m²       No intake or output data in the 24 hours ending 02/24/21 1023     Physical Examination:     I had a face to face encounter with this patient and independently examined them on 2/23/2021 as outlined below:        Constitutional:  Alert. Sitting in a chair by the bedside. Not in distress. HEENT:  Atraumatic. Oral mucosa moist,. Non icteric sclera. No pallor. Resp:  CTA bilaterally. No wheezing/rhonchi/rales. No accessory muscle use   Chest Wall: No deformity   CV:  Regular rhythm, normal rate, no murmurs, gallops, rubs    GI:  Soft, non distended, non tender. normoactive bowel sounds, no hepatosplenomegaly    :  No CVA or suprapubic tenderness    Musculoskeletal:  No edema, warm, 2+ pulses throughout  Vesicular lesions, drying on the left infra axillary region without crossing the midline    Neurologic:  Mental status: Alert and oriented.   Cranial nerves II-XII : WNL  Motor exam:Moves all extremities symmetrically              Data Review:    Review and/or order of clinical lab test  Review and/or order of tests in the radiology section of CPT  Review and/or order of tests in the medicine section of CPT      Labs:     Recent Labs     02/23/21  0141 02/22/21  1906   WBC 5.3 5.8   HGB 10.7* 10.4*   HCT 32.7* 30.4*   * 157     Recent Labs     02/23/21  0141 02/22/21 1906   * 132*   K 3.8 3.5    103   CO2 20* 21   BUN 25* 31*   CREA 1.35* 1.63*   GLU 88 132*   CA 8.0* 8.4*   MG  --  1.7   PHOS  --  3.0     Recent Labs     02/22/21 1906   ALT 17   AP 94   TBILI 0.4   TP 7.3   ALB 3.0*   GLOB 4.3*   LPSE 247     Recent Labs     02/22/21 1906   INR 1.0   PTP 10.9      No results for input(s): FE, TIBC, PSAT, FERR in the last 72 hours. Lab Results   Component Value Date/Time    Folate 75.0 (H) 02/22/2021 07:06 PM      No results for input(s): PH, PCO2, PO2 in the last 72 hours.   Recent Labs     02/23/21  0152 02/22/21 1906   TROIQ <0.05 <0.05     No results found for: CHOL, CHOLX, CHLST, CHOLV, HDL, HDLP, LDL, LDLC, DLDLP, TGLX, TRIGL, TRIGP, CHHD, CHHDX  No results found for: [facility]  Lab Results   Component Value Date/Time    Color DARK YELLOW 02/22/2021 06:40 PM    Appearance TURBID (A) 02/22/2021 06:40 PM    Specific gravity 1.016 02/22/2021 06:40 PM    pH (UA) 5.5 02/22/2021 06:40 PM    Protein 30 (A) 02/22/2021 06:40 PM    Glucose Negative 02/22/2021 06:40 PM    Ketone Negative 02/22/2021 06:40 PM    Bilirubin Negative 02/22/2021 06:40 PM    Urobilinogen 0.2 02/22/2021 06:40 PM    Nitrites Negative 02/22/2021 06:40 PM    Leukocyte Esterase LARGE (A) 02/22/2021 06:40 PM    Epithelial cells FEW 02/22/2021 06:40 PM    Bacteria 4+ (A) 02/22/2021 06:40 PM    WBC >100 (H) 02/22/2021 06:40 PM    RBC 5-10 02/22/2021 06:40 PM         Medications Reviewed:     Current Facility-Administered Medications   Medication Dose Route Frequency    famciclovir (FAMVIR) tablet 250 mg  250 mg Oral TID    pantoprazole (PROTONIX) tablet 40 mg  40 mg Oral ACB&D    ascorbic acid (vitamin C) (VITAMIN C) tablet 500 mg  500 mg Oral DAILY    vitamin B complex tablet  1 Tab Oral DAILY    L.acidophilus-paracasei-S.thermophil-bifidobacter (RISAQUAD) 8 billion cell capsule  1 Cap Oral DAILY    donepeziL (ARICEPT) tablet 5 mg  5 mg Oral QHS    mirtazapine (REMERON) tablet 7.5 mg  7.5 mg Oral QHS    QUEtiapine (SEROquel) tablet 100 mg  100 mg Oral QHS    famotidine (PEPCID) tablet 20 mg  20 mg Oral DAILY PRN    sodium chloride (NS) flush 5-40 mL  5-40 mL IntraVENous Q8H    sodium chloride (NS) flush 5-40 mL  5-40 mL IntraVENous PRN    acetaminophen (TYLENOL) tablet 650 mg  650 mg Oral Q6H PRN    Or    acetaminophen (TYLENOL) suppository 650 mg  650 mg Rectal Q6H PRN    polyethylene glycol (MIRALAX) packet 17 g  17 g Oral DAILY PRN    promethazine (PHENERGAN) tablet 12.5 mg  12.5 mg Oral Q6H PRN    Or    ondansetron (ZOFRAN) injection 4 mg  4 mg IntraVENous Q6H PRN    0.9% sodium chloride infusion  100 mL/hr IntraVENous CONTINUOUS    cefTRIAXone (ROCEPHIN) 1 g in 0.9% sodium chloride (MBP/ADV) 50 mL MBP  1 g IntraVENous Q24H     ______________________________________________________________________  EXPECTED LENGTH OF STAY: - - -  ACTUAL LENGTH OF STAY:          0                 Abilio Summers MD

## 2021-02-24 NOTE — PROGRESS NOTES
Bedside and Verbal shift change report given to Jose Briggs RN (oncoming nurse) by Mayra Hernandez RN (offgoing nurse). Report included the following information SBAR, Kardex, Intake/Output, MAR, Recent Results and Cardiac Rhythm NSR.

## 2021-02-24 NOTE — PROGRESS NOTES
Nutrition Note    [employer] spoke to pt's daughter who said pt was on a [place]. RD added Kosher to current diet order and diet office will provide list of available Kosher foods.      Electronically signed by Liam Hand RD on 2/24/2021 at 4:55 PM    Contact: Rey

## 2021-02-24 NOTE — PROGRESS NOTES
Physical Therapy    Chart reviewed in prep for PT treatment. Session deferred per conversation with pt's daughter who states pt is very weak today, not eating, unable to tolerate therapy at this time. Will continue to follow.     Sabrina Soto, PT, MPT

## 2021-02-24 NOTE — PROGRESS NOTES
Problem: Mobility Impaired (Adult and Pediatric)  Goal: *Acute Goals and Plan of Care (Insert Text)  Description: FUNCTIONAL STATUS PRIOR TO ADMISSION: Patient was modified independent using a single point cane or RW for functional mobility. Daughter reports she stayed very close of contact guard secondary to hx of dizziness and fall 5/2020 fx humerus. Daughter states pt uses BSC at night and bathroom during day. Patient is never left alone. HOME SUPPORT PRIOR TO ADMISSION: The patient lived with daughter - required help for bathing, dressing, and meal preparation. Patient enjoyed playing cards with family members . Physical Therapy Goals  Initiated 2/23/2021  1. Patient will move from supine to sit and sit to supine , scoot up and down, and roll side to side in bed with independence within 7 day(s). 2.  Patient will transfer from bed to chair and chair to bed with supervision/set-up using the least restrictive device within 7 day(s). 3.  Patient will perform sit to stand with supervision/set-up within 7 day(s). 4.  Patient will ambulate with supervision/set-up for > 100 feet with the least restrictive device within 7 day(s). 5.  Patient will ascend/descend 4 stairs with rail handrail(s) with contact guard assist within 7 day(s). PHYSICAL THERAPY EVALUATION  Patient: Shaggy Cha (3 y.o. female)  Date: 2/23/2021  Primary Diagnosis: Acute cystitis [N30.00]        Precautions:   Fall; On precautions - Active shingles    ASSESSMENT  Based on the objective data described below, the patient presents with baseline dementia - oriented x 2, poor memory - reported by daughter present. Pt does not speak English Debbie) and daughter translated for evaluation. Daughter reporting hx of dizziness over past year - one fall with injury - humerus fx and unstable gait.   Pt also presenting with sensitivity to touch/pressure, Left earlobe pain, and dizziness at position changes - supine to sit and sit to stand - although Bp stable throughout. Pt appears close to baseline - recommend home PT at discharge secondary to balance deficits and fall risk. Current Level of Function Impacting Discharge (mobility/balance): Hand held assist for sit to stand and amb short distance ; next visit assess with RW    Functional Outcome Measure: The patient scored 55/100 on the Barthel Index outcome measure which is indicative of 45% disability for ADL's. Other factors to consider for discharge: supportive family, close to baseline     Patient will benefit from skilled therapy intervention to address the above noted impairments. PLAN :  Recommendations and Planned Interventions: bed mobility training, transfer training, gait training, therapeutic exercises, patient and family training/education, and therapeutic activities      Frequency/Duration: Patient will be followed by physical therapy:  5 times a week to address goals. Recommendation for discharge: (in order for the patient to meet his/her long term goals)  Physical therapy at least 2 days/week in the home     This discharge recommendation:  Has not yet been discussed the attending provider and/or case management    IF patient discharges home will need the following DME: patient owns DME required for discharge         SUBJECTIVE:     OBJECTIVE DATA SUMMARY:   HISTORY:    Past Medical History:   Diagnosis Date    Dementia     Endocrine disease     adrenal gland issues    Gastrointestinal disorder     C. Diff    Hypertension     Malaria     UTI (lower urinary tract infection)      Past Surgical History:   Procedure Laterality Date    HX ORTHOPAEDIC      ORIF arm       Personal factors and/or comorbidities impacting plan of care: as above     Home Situation  Home Environment: Private residence  # Steps to Enter:  2(uses cane and hand held assist)  One/Two Story Residence: One story  Living Alone: No  Support Systems: Child(asha)  Patient Expects to be Discharged to[de-identified] Private residence  Current DME Used/Available at Home: Redgie Blocker, straight, Walker, rollator  Tub or Shower Type: Shower    EXAMINATION/PRESENTATION/DECISION MAKING:   Critical Behavior:  Neurologic State: Alert  Orientation Level: Oriented to person, Oriented to place, Disoriented to situation, Disoriented to time(interpretation through daughter)  Cognition: Memory loss, Follows commands  Safety/Judgement: Decreased awareness of environment, Decreased awareness of need for assistance, Decreased awareness of need for safety, Decreased insight into deficits  Hearing: Auditory  Auditory Impairment: None  Range Of Motion:  AROM: Within functional limits                       Strength:    Strength: Generally decreased, functional                         Coordination:  Coordination: Within functional limits  Functional Mobility:  Bed Mobility:     Supine to Sit: Supervision        Transfers:  Sit to Stand: Minimum assistance;Assist x1(HHA)  Stand to Sit: Supervision        Bed to Chair: Minimum assistance;Assist x1              Balance:   Sitting: Intact; With support  Standing: Impaired; With support  Standing - Static: Good(hand held assist )  Standing - Dynamic : Fair(hand held assist - loss of balance x 1)  Ambulation/Gait Training:  Distance (ft): 10 Feet (ft)  Assistive Device: Gait belt(hand held assist)  Ambulation - Level of Assistance: Contact guard assistance;Minimal assistance        Gait Abnormalities: Decreased step clearance;Shuffling gait        Base of Support: Narrowed     Speed/Rianna: Slow  Step Length: Right shortened;Left shortened        Functional Measure:  Barthel Index:    Bathin  Bladder: 10  Bowels: 10  Groomin  Dressin  Feeding: 10  Mobility: 0  Stairs: 0  Toilet Use: 5  Transfer (Bed to Chair and Back): 10  Total: 55/100       The Barthel ADL Index: Guidelines  1. The index should be used as a record of what a patient does, not as a record of what a patient could do.   2. The main aim is to establish degree of independence from any help, physical or verbal, however minor and for whatever reason. 3. The need for supervision renders the patient not independent. 4. A patient's performance should be established using the best available evidence. Asking the patient, friends/relatives and nurses are the usual sources, but direct observation and common sense are also important. However direct testing is not needed. 5. Usually the patient's performance over the preceding 24-48 hours is important, but occasionally longer periods will be relevant. 6. Middle categories imply that the patient supplies over 50 per cent of the effort. 7. Use of aids to be independent is allowed. Ugo Allred., Barthel, DHunterW. (2962). Functional evaluation: the Barthel Index. [address] [facility] (14)2. Arielle Faust demetra EDIN John, Cally Fuentes, Martina Burgess., [place], [address] (1999). Measuring the change indisability after inpatient rehabilitation; comparison of the responsiveness of the Barthel Index and Functional [place] Measure. Journal of Neurology, Neurosurgery, and Psychiatry, 66(4), 874-700. Jose Nath, N.J.A, LISSETTE Blankenship, & Sammie Pickering, M.A. (2004.) Assessment of post-stroke quality of life in cost-effectiveness studies: The usefulness of the Barthel Index and the EuroQoL-5D.  Quality of Life Research, 15, 311-47           Physical Therapy Evaluation Charge Determination   History Examination Presentation Decision-Making   MEDIUM  Complexity : 1-2 comorbidities / personal factors will impact the outcome/ POC  MEDIUM Complexity : 3 Standardized tests and measures addressing body structure, function, activity limitation and / or participation in recreation  LOW Complexity : Stable, uncomplicated  LOW Complexity : FOTO score of       Based on the above components, the patient evaluation is determined to be of the following complexity level: LOW     Pain Rating:  Pt denied pain    Activity Tolerance:   Fair - tolerating OOB/up in chair - pt requesting to walk in room. After treatment patient left in no apparent distress:   Sitting in chair, Call bell within reach, Bed / chair alarm activated, and Caregiver / family present    COMMUNICATION/EDUCATION:   The patients plan of care was discussed with: Registered nurse. Fall prevention education was provided and the patient/caregiver indicated understanding., Patient/family have participated as able in goal setting and plan of care. , and Patient/family agree to work toward stated goals and plan of care.     Thank you for this referral.  Pieter Villarreal, PT   Time Calculation: 45 mins

## 2021-02-25 ENCOUNTER — APPOINTMENT (OUTPATIENT)
Dept: NON INVASIVE DIAGNOSTICS | Age: 86
End: 2021-02-25
Attending: STUDENT IN AN ORGANIZED HEALTH CARE EDUCATION/TRAINING PROGRAM
Payer: MEDICARE

## 2021-02-25 VITALS
SYSTOLIC BLOOD PRESSURE: 142 MMHG | TEMPERATURE: 98.4 F | HEIGHT: 56 IN | DIASTOLIC BLOOD PRESSURE: 99 MMHG | OXYGEN SATURATION: 98 % | WEIGHT: 126 LBS | HEART RATE: 89 BPM | BODY MASS INDEX: 28.34 KG/M2 | RESPIRATION RATE: 16 BRPM

## 2021-02-25 LAB
BACTERIA SPEC CULT: ABNORMAL
CC UR VC: ABNORMAL
SERVICE CMNT-IMP: ABNORMAL

## 2021-02-25 PROCEDURE — 97535 SELF CARE MNGMENT TRAINING: CPT

## 2021-02-25 PROCEDURE — 97116 GAIT TRAINING THERAPY: CPT

## 2021-02-25 PROCEDURE — 74011250637 HC RX REV CODE- 250/637: Performed by: INTERNAL MEDICINE

## 2021-02-25 PROCEDURE — 99218 HC RM OBSERVATION: CPT

## 2021-02-25 PROCEDURE — 74011250637 HC RX REV CODE- 250/637: Performed by: HOSPITALIST

## 2021-02-25 PROCEDURE — 93306 TTE W/DOPPLER COMPLETE: CPT

## 2021-02-25 PROCEDURE — 97530 THERAPEUTIC ACTIVITIES: CPT

## 2021-02-25 RX ORDER — FAMCICLOVIR 500 MG/1
500 TABLET ORAL EVERY 24 HOURS
Qty: 3 TAB | Refills: 0 | Status: SHIPPED | OUTPATIENT
Start: 2021-02-26 | End: 2021-03-01

## 2021-02-25 RX ORDER — FAMCICLOVIR 500 MG/1
500 TABLET ORAL EVERY 24 HOURS
Qty: 3 TAB | Refills: 0 | Status: SHIPPED | OUTPATIENT
Start: 2021-02-26 | End: 2021-02-25

## 2021-02-25 RX ORDER — CEFDINIR 300 MG/1
300 CAPSULE ORAL EVERY 12 HOURS
Status: DISCONTINUED | OUTPATIENT
Start: 2021-02-25 | End: 2021-02-25 | Stop reason: HOSPADM

## 2021-02-25 RX ORDER — PANTOPRAZOLE SODIUM 40 MG/1
40 TABLET, DELAYED RELEASE ORAL
Qty: 30 TAB | Refills: 0 | Status: SHIPPED | OUTPATIENT
Start: 2021-02-25 | End: 2021-02-25

## 2021-02-25 RX ORDER — CEFDINIR 300 MG/1
300 CAPSULE ORAL EVERY 12 HOURS
Qty: 8 CAP | Refills: 0 | Status: SHIPPED | OUTPATIENT
Start: 2021-02-25 | End: 2021-02-25

## 2021-02-25 RX ORDER — PANTOPRAZOLE SODIUM 40 MG/1
40 TABLET, DELAYED RELEASE ORAL
Qty: 30 TAB | Refills: 0 | Status: SHIPPED | OUTPATIENT
Start: 2021-02-25 | End: 2022-10-26

## 2021-02-25 RX ORDER — CEFDINIR 300 MG/1
300 CAPSULE ORAL EVERY 12 HOURS
Qty: 8 CAP | Refills: 0 | Status: SHIPPED | OUTPATIENT
Start: 2021-02-25 | End: 2021-03-01

## 2021-02-25 RX ADMIN — OXYCODONE HYDROCHLORIDE AND ACETAMINOPHEN 500 MG: 500 TABLET ORAL at 08:49

## 2021-02-25 RX ADMIN — Medication 1 TABLET: at 08:49

## 2021-02-25 RX ADMIN — FAMCICLOVIR 500 MG: 250 TABLET, FILM COATED ORAL at 08:49

## 2021-02-25 RX ADMIN — CEFDINIR 300 MG: 300 CAPSULE ORAL at 16:24

## 2021-02-25 RX ADMIN — PANTOPRAZOLE SODIUM 40 MG: 40 TABLET, DELAYED RELEASE ORAL at 06:50

## 2021-02-25 RX ADMIN — PANTOPRAZOLE SODIUM 40 MG: 40 TABLET, DELAYED RELEASE ORAL at 16:23

## 2021-02-25 RX ADMIN — Medication 10 ML: at 16:24

## 2021-02-25 RX ADMIN — Medication 1 CAPSULE: at 08:49

## 2021-02-25 NOTE — PROGRESS NOTES
Problem: Self Care Deficits Care Plan (Adult)  Goal: *Acute Goals and Plan of Care (Insert Text)  Description:   FUNCTIONAL STATUS PRIOR TO ADMISSION: Patient was modified independent using a rollator and single point cane for functional mobility with daughter with S will all ambulatory tasks. Patient required moderate assistance to S for basic and instrumental ADLs with care provided by daughter, [facility] used beside bed for night use, mentation/orientation fluctuates per daughter due to dementia. HOME SUPPORT: The patient lived daughter and family/ with mod-S to provide assistance due to dementia. Occupational Therapy Goals  Initiated 2/23/2021  1. Patient will perform grooming standing at sink with supervision/set-up within 7 day(s). 2.  Patient will perform bathing with moderate assistance  within 7 day(s). 3.  Patient will perform lower body dressing with minimal assistance/contact guard assist within 7 day(s). 4.  Patient will perform toilet transfers with supervision/set-up within 7 day(s). 5.  Patient will perform all aspects of toileting with supervision/set-up within 7 day(s). 6.  Patient will participate in upper extremity therapeutic exercise/activities with supervision/set-up for 5 minutes within 7 day(s). Outcome: Progressing Towards Goal   OCCUPATIONAL THERAPY TREATMENT  Patient: Jazmine Franklin (71 y.o. female)  Date: 2/25/2021  Diagnosis: Acute cystitis [N30.00] <principal problem not specified>       Precautions: Fall  Chart, occupational therapy assessment, plan of care, and goals were reviewed. ASSESSMENT  Patient continues with skilled OT services and is progressing towards goals. Progressing well this session with improved balance, command following, and tolerance to up OOB with setup for ADLs with daughter's S. No dizziness noted during session.  Currently CGA with RW, daughter feels comfortable providing this level of assist for home, educated on safety with fall prevention with standard RW use and BSC for day/night time, no other discharge needs. Current Level of Function Impacting Discharge (ADLs): CGA with RW, remains mod A for LB ADls and UB ADLs, use of BSC    Other factors to consider for discharge: daughter to provide 24/7 care         PLAN :  Patient continues to benefit from skilled intervention to address the above impairments. Continue treatment per established plan of care. to address goals. Recommend with staff: BSC use    Recommend next OT session: bathroom mobility retraining    Recommendation for discharge: (in order for the patient to meet his/her long term goals)  Occupational therapy at least 2 days/week in the home AND ensure assist and/or supervision for safety with ADLs    This discharge recommendation:  Has been made in collaboration with the attending provider and/or case management    IF patient discharges home will need the following DME: none       SUBJECTIVE:   Patient stated Berle Piles you very much very much.     OBJECTIVE DATA SUMMARY:   Cognitive/Behavioral Status:  Neurologic State: Alert;Confused(dementia)  Orientation Level: Disoriented to situation;Disoriented to time;Disoriented to place  Cognition: Follows commands             Functional Mobility and Transfers for ADLs:  Bed Mobility:       Transfers:  Sit to Stand: Contact guard assistance  Functional Transfers  Toilet Transfer : Contact guard assistance       Balance:  Sitting: Intact  Standing: Impaired  Standing - Static: Good  Standing - Dynamic : Good    ADL Intervention:  Feeding  Food to Mouth: Set-up  Drink to Mouth: Set-up          Patient instructed and indicated understanding the benefits of maintaining activity tolerance, functional mobility, and independence with self care tasks during acute stay  to ensure safe return home and to baseline.  Encouraged patient to increase frequency and duration OOB, be out of bed for all meals, perform daily ADLs (as approved by RN/MD regarding bathing etc), and performing functional mobility to/from bathroom. Educated on activity modification, building strength and endurance with ADLs and fall prevention. Patient instructed and indicated understanding home modifications (ie raise height of ADL objects, appropriate chair heights, recliner safety), safety (ie change of floor surfaces, clear pathways), to increase independence and fall prevention with RW. Therapeutic Exercises:       Pain:  None noted    Activity Tolerance:   Good and requires rest breaks    After treatment patient left in no apparent distress:   Sitting in chair, Call bell within reach, and Caregiver / family present    COMMUNICATION/COLLABORATION:   The patients plan of care was discussed with: Physical therapist and Registered nurse.      Sarita Molina OT  Time Calculation: 28 mins

## 2021-02-25 NOTE — PROGRESS NOTES
Occupational Therapy    Seen for OT treatment, improved strength and endurance around room with A x1 and RW, daughter feels safe with home with [facility] OT and family assist. No DME needs. ,    Formal note to follow    Jose Enrique Melvin.  Jesse, MS OTR/L

## 2021-02-25 NOTE — PROGRESS NOTES
Problem: Mobility Impaired (Adult and Pediatric)  Goal: *Acute Goals and Plan of Care (Insert Text)  Description: FUNCTIONAL STATUS PRIOR TO ADMISSION: Patient was modified independent using a single point cane or RW for functional mobility. Daughter reports she stayed very close of contact guard secondary to hx of dizziness and fall 5/2020 fx humerus. Daughter states pt uses BSC at night and bathroom during day. Patient is never left alone. HOME SUPPORT PRIOR TO ADMISSION: The patient lived with daughter - required help for bathing, dressing, and meal preparation. Patient enjoyed playing cards with family members . Physical Therapy Goals  Initiated 2/23/2021  1. Patient will move from supine to sit and sit to supine , scoot up and down, and roll side to side in bed with independence within 7 day(s). 2.  Patient will transfer from bed to chair and chair to bed with supervision/set-up using the least restrictive device within 7 day(s). 3.  Patient will perform sit to stand with supervision/set-up within 7 day(s). 4.  Patient will ambulate with supervision/set-up for > 100 feet with the least restrictive device within 7 day(s). 5.  Patient will ascend/descend 4 stairs with rail handrail(s) with contact guard assist within 7 day(s). Outcome: Progressing Towards Goal    PHYSICAL THERAPY TREATMENT  Patient: Tati Wellington (83 y.o. female)  Date: 2/25/2021  Diagnosis: Acute cystitis [N30.00] <principal problem not specified>       Precautions: Fall  Chart, physical therapy assessment, plan of care and goals were reviewed. ASSESSMENT  Patient continues with skilled PT services and is progressing towards goals. Plan to discharge home today with family members. Patient has not been walking much with PT secondary to fatigue, however she seems much better this AM. Patient is dressed and willing to walk in the room with RW.  CGA provided for safety and family will be able to provide same amount of assistance at home. Minimal dizziness reported. BP stable. Biggest limiting factor is fatigue, but this is improvement. Daughter stating that Patient will not need to walk much further than what was performed with PT today. Good family support and home set up. Recommend HHPT. Family does not have any further questions regarding functioning at discharge at this time. Current Level of Function Impacting Discharge (mobility/balance): CGA assist    Other factors to consider for discharge: good family support, has equipment needed         PLAN :  Patient continues to benefit from skilled intervention to address the above impairments. Continue treatment per established plan of care. to address goals. Recommendation for discharge: (in order for the patient to meet his/her long term goals)  Physical therapy at least 2 days/week in the home AND ensure assist and/or supervision for safety with mobility    This discharge recommendation:  Has been made in collaboration with the attending provider and/or case management    IF patient discharges home will need the following DME: patient owns DME required for discharge       SUBJECTIVE:   Patient stated I'm very tired now.  (Speaks [place])    OBJECTIVE DATA SUMMARY:   Critical Behavior:  Neurologic State: Alert, Confused(dementia)  Orientation Level: Disoriented to situation, Disoriented to time, Disoriented to place  Cognition: Follows commands  Safety/Judgement: Decreased awareness of environment, Decreased awareness of need for assistance, Decreased awareness of need for safety, Decreased insight into deficits  Functional Mobility Training:    Transfers:  Sit to Stand: Contact guard assistance  Stand to Sit: Setup    Balance:  Sitting: Intact  Standing: Impaired  Standing - Static: Good  Standing - Dynamic : Good    Ambulation/Gait Training:  Distance (ft): 75 Feet (ft)  Assistive Device: Gait belt;Walker, rolling  Ambulation - Level of Assistance: Contact guard assistance; Additional time; Adaptive equipment  Gait Abnormalities: Decreased step clearance  Base of Support: Narrowed  Speed/Rianna: Slow  Step Length: Right shortened;Left shortened      Activity Tolerance:   Good, SpO2 stable on RA, and requires rest breaks    After treatment patient left in no apparent distress:   Sitting in chair, Call bell within reach, and Caregiver / family present    COMMUNICATION/COLLABORATION:   The patients plan of care was discussed with: Occupational therapist and Registered nurse.      Gabriel Schilling PT, DPT  Geriatric Clinical Specialist     Time Calculation: 28 mins

## 2021-02-25 NOTE — PROGRESS NOTES
BRITTNEY;  CRM spoke with Willian Ahn from [facility] is aware of patient being discharged  Home. I have sent discharge summary to [facility].

## 2021-02-25 NOTE — DISCHARGE INSTRUCTIONS
Discharge Instructions       PATIENT ID: Desmond Brewster  MRN: [id number]   YOB: 1927    DATE OF ADMISSION: 2/22/2021  5:01 PM    DATE OF DISCHARGE: 2/25/2021    PRIMARY CARE PROVIDER: Emelina Trejo MD     ATTENDING PHYSICIAN: Shamar Winchester MD  DISCHARGING PROVIDER: Jace Porter MD    To contact this individual call [phone] and ask the  to page. If unavailable ask to be transferred the Adult Hospitalist Department. DISCHARGE DIAGNOSES   #UTI, E. coli on urine culture  #Metabolic encephalopathy, transient and resolved, secondary to UTI  #Syncope, in the setting of chronic dizziness most likely related to dehydration and UTI, resolved  #Shingles along left flank from front of the abdomen today back, approaching up to midline of the left side of front and back  #Dementia/MDD/mood disorder, at baseline  #GERD    CONSULTATIONS: IP CONSULT TO HOSPITALIST    PROCEDURES/SURGERIES: * No surgery found *    PENDING TEST RESULTS:   At the time of discharge the following test results are still pending: None    FOLLOW UP APPOINTMENTS:   Follow-up Information     Follow up With Specialties Details Why [address] On 2/26/2021 home health services [address]    Emelina Trejo MD Family Medicine Go on 3/5/2021 Hospital follow up appt has been scheduled for March 5 @ 9:45AM with the Physician Assistant Ramin 784Melissa Barrera OT   PHONE [phone] [phone]           ADDITIONAL CARE RECOMMENDATIONS:   Follow-up with PCP as scheduled above within 1 week for post hospital discharge follow-up  Home health PT/OT as scheduled and noted above  · It is important that you take the medication exactly as they are prescribed.    · Keep your medication in the bottles provided by the pharmacist and keep a list of the medication names, dosages, and times to be taken in your wallet. · Do not take other medications without consulting your doctor. · No drinking alcohol or driving car or operating machinery if you are on narcotic pain medications. Donot take sedating mediations if you are sleepy or confused. · Fall Precautions  · Keep Well Hydrated  · Report to your medical provider if you feel you have  developed allergies to medications  · Follow up with your PCP or Consultant for medication adjustments and refills  · Monitor for signs of fevers,chills,bleeding,chest pain and seek medical attention if you do so. DIET: Regular Diet    ACTIVITY: Activity as tolerated and PT/OT per Home Health    WOUND CARE: Keep cool and dry over the shingles. If pain is bothersome, please check with PCP to consider Neurontin/gabapentin for neuropathic pain related to shingles. EQUIPMENT needed: N/A      Radiology:  Ct Head Wo Cont    Result Date: 2/22/2021  No significant interval change. No evidence of acute process. Xr Chest Port    Result Date: 2/22/2021  No significant interval change. DISCHARGE MEDICATIONS:   See Medication Reconciliation Form    · It is important that you take the medication exactly as they are prescribed. · Keep your medication in the bottles provided by the pharmacist and keep a list of the medication names, dosages, and times to be taken in your wallet. · Do not take other medications without consulting your doctor. NOTIFY YOUR PHYSICIAN FOR ANY OF THE FOLLOWING:   Fever over 101 degrees for 24 hours. Chest pain, shortness of breath, fever, chills, nausea, vomiting, diarrhea, change in mentation, falling, weakness, bleeding. Severe pain or pain not relieved by medications. Or, any other signs or symptoms that you may have questions about.       DISPOSITION:  x  Home With:  x OT x PT x HH  RN       SNF/Inpatient Rehab/LTAC    Independent/assisted living    Hospice Other:     CDMP Checked:   Yes x     PROBLEM LIST Updated:  Yes x        My Medications      START taking these medications      Instructions Each Dose to Equal Morning Noon Evening Bedtime   cefdinir 300 mg capsule  Commonly known as: OMNICEF    Your last dose was: Your next dose is: Take 1 Cap by mouth every twelve (12) hours for 8 doses. 300 mg                 famciclovir 500 mg tablet  Commonly known as: FAMVIR  Start taking on: February 26, 2021    Your last dose was: Your next dose is: Take 1 Tab by mouth every twenty-four (24) hours for 3 days. Indications: shingles  Indications: shingles   500 mg                 pantoprazole 40 mg tablet  Commonly known as: PROTONIX    Your last dose was: Your next dose is: Take 1 Tab by mouth Before breakfast and dinner. 40 mg                    CONTINUE taking these medications      Instructions Each Dose to Equal Morning Noon Evening Bedtime   b complex vitamins tablet    Your last dose was: Your next dose is: Take 1 Tab by mouth daily. 1 Tab                 cranberry extract 450 mg Tab tablet    Your last dose was: Your next dose is: Take 450 mg by mouth. 450 mg                 donepeziL 5 mg tablet  Commonly known as: ARICEPT    Your last dose was: Your next dose is: Take 5 mg by mouth nightly. 5 mg                 Traci-Q 8 billion cell Cap cap  Generic drug: L. acidoph & paracasei- S therm- Bifido    Your last dose was: Your next dose is: Take 1 Cap by mouth daily. 1 Cap                 mirtazapine 7.5 mg tablet  Commonly known as: REMERON    Your last dose was: Your next dose is: Take 7.5 mg by mouth nightly. 7.5 mg                 ondansetron 4 mg disintegrating tablet  Commonly known as: Zofran ODT    Your last dose was: Your next dose is: Take 1 Tab by mouth every eight (8) hours as needed for Nausea.    4 mg                 raNITIdine 75 mg Tab  Commonly known as: ZANTAC    Your last dose was: Your next dose is: Take 150 mg by mouth two (2) times daily as needed for Indigestion. 150 mg                 SEROqueL 100 mg tablet  Generic drug: QUEtiapine    Your last dose was: Your next dose is: Take 100 mg by mouth nightly. 100 mg                 Vitamin C 500 mg tablet  Generic drug: ascorbic acid (vitamin C)    Your last dose was: Your next dose is: Take 500 mg by mouth daily. 500 mg                 Vitamin D3 25 mcg (1,000 unit) Cap  Generic drug: cholecalciferol    Your last dose was: Your next dose is: Take  by mouth daily.  Indications: dose unknown                        Where to Get Your Medications      These medications were sent to [address] [place] [address]  [address] [place] [address]    Phone: [phone]   · cefdinir 300 mg capsule  · famciclovir 500 mg tablet  · pantoprazole 40 mg tablet         Signed:   Renita Farley MD  2/25/2021  4:04 PM

## 2021-02-25 NOTE — ROUTINE PROCESS
Hospital follow-up PCP transitional care appointment has been scheduled with RUTH Barth for March 5 @ 945AM. Pending patient discharge.   Tawnya Riggins, Care Management Specialist.

## 2021-02-25 NOTE — DISCHARGE SUMMARY
Discharge Summary       PATIENT ID: Armando Flores  MRN: [id number]   YOB: 1927    DATE OF ADMISSION: 2/22/2021  5:01 PM    DATE OF DISCHARGE: 02/25/21   PRIMARY CARE PROVIDER: Jake Olmedo MD     ATTENDING PHYSICIAN: Igor Bhatia MD  DISCHARGING PROVIDER: Igor Bhatia MD    To contact this individual call [phone] and ask the  to page. If unavailable ask to be transferred the Adult Hospitalist Department. CONSULTATIONS: IP CONSULT TO HOSPITALIST    PROCEDURES/SURGERIES: * No surgery found *    ADMITTING [address] [place] [address] COURSE:   #UTI, E. coli on urine culture  #Metabolic encephalopathy, transient and resolved, secondary to UTI  #Syncope, in the setting of chronic dizziness most likely related to dehydration and UTI, resolved  #Shingles along left flank from front of the abdomen today back, approaching up to midline of the left side of front and back  #Dementia/MDD/mood disorder, at baseline  #GERD    Admission Summary:   Akira Villanueva a 80 y. o. female with past medical history of dementia with behavioral disturbance, GERD, hypertension who is admitted for acute cystitis and syncope.         Interval history / Subjective:        I have seen and examined patient, daughter at the bedside. Patient is feeling okay. Sitting up in the chair and ready. Denied any pain at the shingles site however sensitive. D/W with patient and daughter about gabapentin for neuropathic pain if needed for shingles. The deferred and suggested no need at present given no pain. Otherwise no other concerns. DISCHARGE DIAGNOSES / PLAN:      UTI. Urine culture growing E. coli  -Continue with Rocephin 1 g daily  -We will change to p.o. cefdinir to complete total 7 days  -Urine cultures noted  -Ensure adequate rate hydration. Updated patient/daughter for the same.     Metabolic encephalopathy, transient and resolved.   -Back to her baseline as per daughter  -Likely multifactorial in setting of acute cystitis and baseline dementia  -Treat UTI as above     Syncope in the setting of chronic dizziness most probably precipitated by UTI, dehydration.  -Difficult to characterize if this was an actual syncopal episode.  -Head CT is unremarkable. -NSR on telemetry.  EKG  -Carotid Doppler unremarkable  -Patient denied dizziness now.     Shingles diagnosed PTA.  -On  famciclovir. Will treat for 7 days total  -PCP follow-up and further as appropriate per PCP  -Defer consideration of gabapentin for neuropathic pain if needed. Patient and daughter deferred at present.     Dementia /MDD /mood disorder  -Continue home Aricept, Remeron and quetiapine     GERD  -PPI.          Code status: Full code  DVT prophylaxis: scd  Care Plan discussed with: Patient/Family and Nurse  Anticipated Disposition: DC home with home health today. ADDITIONAL CARE RECOMMENDATIONS:   Follow-up with PCP as scheduled above within 1 week for post hospital discharge follow-up  Home health PT/OT as scheduled and noted above  · It is important that you take the medication exactly as they are prescribed. · Keep your medication in the bottles provided by the pharmacist and keep a list of the medication names, dosages, and times to be taken in your wallet. · Do not take other medications without consulting your doctor. · No drinking alcohol or driving car or operating machinery if you are on narcotic pain medications. Donot take sedating mediations if you are sleepy or confused. · Fall Precautions  · Keep Well Hydrated  · Report to your medical provider if you feel you have  developed allergies to medications  · Follow up with your PCP or Consultant for medication adjustments and refills  · Monitor for signs of fevers,chills,bleeding,chest pain and seek medical attention if you do so.    ·       DIET: Regular Diet     ACTIVITY: Activity as tolerated and PT/OT per Home Health     WOUND CARE: Keep cool and dry over the shingles. If pain is bothersome, please check with PCP to consider Neurontin/gabapentin for neuropathic pain related to shingles.     EQUIPMENT needed: N/A    PENDING TEST RESULTS:   At the time of discharge the following test results are still pending: None    FOLLOW UP APPOINTMENTS:    Follow-up Information     Follow up With Specialties Details Why [address] On 2/26/2021 home health services [facility]  [phone]    Mich Romero MD Family Medicine Go on 3/5/2021 Hospital follow up appt has been scheduled for March 5 @ 9:45AM with the Physician Assistant Ramin Colunga [phone]               DISCHARGE MEDICATIONS:  Current Discharge Medication List      START taking these medications    Details   cefdinir (OMNICEF) 300 mg capsule Take 1 Cap by mouth every twelve (12) hours for 8 doses. Qty: 8 Cap, Refills: 0      famciclovir (FAMVIR) 500 mg tablet Take 1 Tab by mouth every twenty-four (24) hours for 3 days. Indications: shingles  Indications: shingles  Qty: 3 Tab, Refills: 0      pantoprazole (PROTONIX) 40 mg tablet Take 1 Tab by mouth Before breakfast and dinner. Qty: 30 Tab, Refills: 0         CONTINUE these medications which have NOT CHANGED    Details   cholecalciferol (VITAMIN D3) 1,000 unit cap Take  by mouth daily. Indications: dose unknown      cranberry extract 450 mg tab tablet Take 450 mg by mouth. ondansetron (ZOFRAN ODT) 4 mg disintegrating tablet Take 1 Tab by mouth every eight (8) hours as needed for Nausea. Qty: 12 Tab, Refills: 0      mirtazapine (REMERON) 7.5 mg tablet Take 7.5 mg by mouth nightly. ranitidine (ZANTAC) 75 mg tablet Take 150 mg by mouth two (2) times daily as needed for Indigestion.       b complex vitamins tablet Take 1 Tab by mouth daily.      QUEtiapine (SEROQUEL) 100 mg tablet Take 100 mg by mouth nightly.      L. acidoph & paracasei- S therm- Bifido (VEDA-Q) 8 billion cell cap cap Take 1 Cap by mouth daily.      donepezil (ARICEPT) 5 mg tablet Take 5 mg by mouth nightly.      ascorbic acid (VITAMIN C) 500 mg tablet Take 500 mg by mouth daily.               NOTIFY YOUR PHYSICIAN FOR ANY OF THE FOLLOWING:   Fever over 101 degrees for 24 hours.   Chest pain, shortness of breath, fever, chills, nausea, vomiting, diarrhea, change in mentation, falling, weakness, bleeding. Severe pain or pain not relieved by medications.  Or, any other signs or symptoms that you may have questions about.    DISPOSITION:  x  Home With:  x OT x PT x HH  RN       Long term SNF/Inpatient Rehab    Independent/assisted living    Hospice    Other:       PATIENT CONDITION AT DISCHARGE:     Functional status    Poor    x Deconditioned     Independent      Cognition     Lucid     Forgetful    x Dementia      Catheters/lines (plus indication)    Salcido     PICC     PEG    x None      Code status    x Full code     DNR      PHYSICAL EXAMINATION AT DISCHARGE:  Visit Vitals  BP (!) 142/99 (BP 1 Location: Right upper arm, BP Patient Position: At rest)   Pulse 89   Temp 98.4 °F (36.9 °C)   Resp 16   Ht 4' 8\" (1.422 m)   Wt 57.2 kg (126 lb)   SpO2 98%   BMI 28.25 kg/m²       I had a face to face encounter with this patient and independently examined them on 2/25/2021 as outlined below:                                                Constitutional:  Alert.    Pleasant, comfortable.  Sitting in a chair by the bedside.  Not in distress.    HEENT:  Atraumatic.oral mucosa moist.  Non icteric sclera.No pallor.   Resp:  CTA bilaterally. No wheezing/rhonchi/rales. No accessory muscle use   Chest Wall: No deformity   CV:  Regular rhythm, normal rate, no murmurs, gallops, rubs    GI:  Soft, non distended, non tender. normoactive bowel sounds, no hepatosplenomegaly    :  No CVA or  suprapubic tenderness    Musculoskeletal:  No edema, warm, 2+ pulses throughout  Vesicular lesions, drying on the left infra axillary region without crossing the midline    Neurologic:  Mental status: Alert and oriented. Cranial nerves II-XII : WNL  Motor exam:Moves all extremities symmetrically         CHRONIC MEDICAL DIAGNOSES:  Problem List as of 2/25/2021 Date Reviewed: 11/6/2019          Codes Class Noted - Resolved    Acute cystitis ICD-10-CM: N30.00  ICD-9-CM: 595.0  2/22/2021 - Present        Syncope ICD-10-CM: R55  ICD-9-CM: 780.2  4/11/2016 - Present        Hypertension ICD-10-CM: I10  ICD-9-CM: 401.9  4/11/2016 - Present        Dementia ([facility] [address] ICD-10-CM: F03.90  ICD-9-CM: 294.20  4/11/2016 - Present        Pelvic fracture ([facility] [address] ICD-10-CM: S32. 9XXA  ICD-9-CM: 808.8  8/27/2015 - Present        Diarrhea ICD-10-CM: R19.7  ICD-9-CM: 787.91  5/21/2014 - Present        Colitis due to Clostridium difficile ICD-10-CM: A04.72  ICD-9-CM: 008.45  4/14/2014 - Present        RESOLVED: Hypotension ICD-10-CM: I95.9  ICD-9-CM: 458.9  7/3/2014 - 7/10/2014        RESOLVED: Hypotension, unspecified ICD-10-CM: I95.9  ICD-9-CM: 458.9  3/28/2014 - 7/10/2014            Radiology:  Ct Head Wo Cont    Result Date: 2/22/2021  No significant interval change. No evidence of acute process. Xr Chest Port    Result Date: 2/22/2021  No significant interval change.      Laboratory data:  Recent Results (from the past 24 hour(s))   ECHO ADULT COMPLETE    Collection Time: 02/25/21  1:36 PM   Result Value Ref Range    MV E/A 0.68     LV Mass .8 67 - 162 g    LV Mass AL Index 73.2 43 - 95 g/m2    LA Vol Index 38.91 16 - 28 ml/m2    LA Vol Index 43.93 16 - 28 ml/m2    LA Vol Index 29.03 16 - 28 ml/m2    TONY/BSA Pk Espinoza 0.9 cm2/m2    IVSd 0.77 0.6 - 0.9 cm    LVIDd 4.40 3.9 - 5.3 cm    LVIDs 2.80 cm    LVOT d 1.64 cm    LVPWd 0.80 0.6 - 0.9 cm    LVOT Peak Gradient 3.65 mmHg    LVOT Peak Velocity 95.55 cm/s    LA Volume 56.77 22 - 52 mL LA Area 4C 17.57 cm2    LA Vol 2C 64.09 (A) 22 - 52 mL    LA Vol 4C 42.36 22 - 52 mL    Aortic Valve Area by Continuity of Peak Velocity 1.36 cm2    AoV PG 8.78 mmHg    Aortic Valve Systolic Peak Velocity 335. 18 cm/s    MV A Espinoza 109.93 cm/s    Mitral Valve E Wave Deceleration Time 220.61 ms    MV E Espinoza 75.25 cm/s    E/E' ratio (averaged) 10.44     E/E' lateral 8.90     E/E' septal 12.63     Mitral Valve Pressure Half-time 63.98 ms    MVA (PHT) 3.44 cm2    Pulmonic Valve Systolic Peak Instantaneous Gradient 2.74 mmHg    Tapse 2.51 (A) 1.5 - 2.0 cm    Ao Root D 3.16 cm       Greater than 40 minutes were spent with the patient on counseling and coordination of care    Signed:   Selena Richards MD  2/25/2021  4:08 PM

## 2021-02-26 LAB
ECHO AO ROOT DIAM: 3.16 CM
ECHO AV AREA PEAK VELOCITY: 1.36 CM2
ECHO AV AREA/BSA PEAK VELOCITY: 0.9 CM2/M2
ECHO AV PEAK GRADIENT: 8.78 MMHG
ECHO AV PEAK VELOCITY: 148.18 CM/S
ECHO LA AREA 4C: 17.57 CM2
ECHO LA VOL 2C: 64.09 ML (ref 22–52)
ECHO LA VOL 4C: 42.36 ML (ref 22–52)
ECHO LA VOL BP: 56.77 ML (ref 22–52)
ECHO LA VOL/BSA BIPLANE: 38.91 ML/M2 (ref 16–28)
ECHO LA VOLUME INDEX A2C: 43.93 ML/M2 (ref 16–28)
ECHO LA VOLUME INDEX A4C: 29.03 ML/M2 (ref 16–28)
ECHO LV INTERNAL DIMENSION DIASTOLIC: 4.4 CM (ref 3.9–5.3)
ECHO LV INTERNAL DIMENSION SYSTOLIC: 2.8 CM
ECHO LV IVSD: 0.77 CM (ref 0.6–0.9)
ECHO LV MASS 2D: 106.8 G (ref 67–162)
ECHO LV MASS INDEX 2D: 73.2 G/M2 (ref 43–95)
ECHO LV POSTERIOR WALL DIASTOLIC: 0.8 CM (ref 0.6–0.9)
ECHO LVOT DIAM: 1.64 CM
ECHO LVOT PEAK GRADIENT: 3.65 MMHG
ECHO LVOT PEAK VELOCITY: 95.55 CM/S
ECHO MV A VELOCITY: 109.93 CM/S
ECHO MV AREA PHT: 3.44 CM2
ECHO MV E DECELERATION TIME (DT): 220.61 MS
ECHO MV E VELOCITY: 75.25 CM/S
ECHO MV E/A RATIO: 0.68
ECHO MV PRESSURE HALF TIME (PHT): 63.98 MS
ECHO PV PEAK INSTANTANEOUS GRADIENT SYSTOLIC: 2.74 MMHG
ECHO RV TAPSE: 2.51 CM (ref 1.5–2)

## 2022-03-18 PROBLEM — N30.00 ACUTE CYSTITIS: Status: ACTIVE | Noted: 2021-02-22

## 2022-10-16 ENCOUNTER — HOSPITAL ENCOUNTER (OUTPATIENT)
Age: 87
Setting detail: OBSERVATION
Discharge: HOME | End: 2022-10-19
Attending: EMERGENCY MEDICINE | Admitting: INTERNAL MEDICINE
Payer: MEDICARE

## 2022-10-16 ENCOUNTER — APPOINTMENT (OUTPATIENT)
Dept: GENERAL RADIOLOGY | Age: 87
End: 2022-10-16
Attending: EMERGENCY MEDICINE
Payer: MEDICARE

## 2022-10-16 DIAGNOSIS — U07.1 COVID-19 VIRUS INFECTION: Primary | ICD-10-CM

## 2022-10-16 DIAGNOSIS — R53.1 GENERALIZED WEAKNESS: ICD-10-CM

## 2022-10-16 PROBLEM — J18.9 PNEUMONIA: Status: ACTIVE | Noted: 2022-10-16

## 2022-10-16 LAB
ALBUMIN SERPL-MCNC: 3.4 G/DL (ref 3.5–5)
ALBUMIN/GLOB SERPL: 0.8 {RATIO} (ref 1.1–2.2)
ALP SERPL-CCNC: 100 U/L (ref 45–117)
ALT SERPL-CCNC: 14 U/L (ref 12–78)
ANION GAP SERPL CALC-SCNC: 7 MMOL/L (ref 5–15)
APPEARANCE UR: CLEAR
AST SERPL-CCNC: 15 U/L (ref 15–37)
B PERT DNA SPEC QL NAA+PROBE: NOT DETECTED
BACTERIA URNS QL MICRO: NEGATIVE /HPF
BASOPHILS # BLD: 0.1 K/UL (ref 0–0.1)
BASOPHILS NFR BLD: 1 % (ref 0–1)
BILIRUB SERPL-MCNC: 0.6 MG/DL (ref 0.2–1)
BILIRUB UR QL: NEGATIVE
BNP SERPL-MCNC: 337 PG/ML
BORDETELLA PARAPERTUSSIS PCR, BORPAR: NOT DETECTED
BUN SERPL-MCNC: 15 MG/DL (ref 6–20)
BUN/CREAT SERPL: 10 (ref 12–20)
C PNEUM DNA SPEC QL NAA+PROBE: NOT DETECTED
CALCIUM SERPL-MCNC: 9.2 MG/DL (ref 8.5–10.1)
CHLORIDE SERPL-SCNC: 103 MMOL/L (ref 97–108)
CO2 SERPL-SCNC: 24 MMOL/L (ref 21–32)
COLOR UR: NORMAL
COMMENT, HOLDF: NORMAL
COMMENT, HOLDF: NORMAL
COVID-19 RAPID TEST, COVR: DETECTED
CREAT SERPL-MCNC: 1.45 MG/DL (ref 0.55–1.02)
CRP SERPL HS-MCNC: >9.5 MG/L
DIFFERENTIAL METHOD BLD: ABNORMAL
EOSINOPHIL # BLD: 0 K/UL (ref 0–0.4)
EOSINOPHIL NFR BLD: 0 % (ref 0–7)
EPITH CASTS URNS QL MICRO: NORMAL /LPF
ERYTHROCYTE [DISTWIDTH] IN BLOOD BY AUTOMATED COUNT: 15.2 % (ref 11.5–14.5)
FLUAV SUBTYP SPEC NAA+PROBE: NOT DETECTED
FLUBV RNA SPEC QL NAA+PROBE: NOT DETECTED
GLOBULIN SER CALC-MCNC: 4.3 G/DL (ref 2–4)
GLUCOSE SERPL-MCNC: 116 MG/DL (ref 65–100)
GLUCOSE UR STRIP.AUTO-MCNC: NEGATIVE MG/DL
HADV DNA SPEC QL NAA+PROBE: NOT DETECTED
HCOV 229E RNA SPEC QL NAA+PROBE: NOT DETECTED
HCOV HKU1 RNA SPEC QL NAA+PROBE: NOT DETECTED
HCOV NL63 RNA SPEC QL NAA+PROBE: NOT DETECTED
HCOV OC43 RNA SPEC QL NAA+PROBE: NOT DETECTED
HCT VFR BLD AUTO: 33.2 % (ref 35–47)
HGB BLD-MCNC: 11.1 G/DL (ref 11.5–16)
HGB UR QL STRIP: NEGATIVE
HMPV RNA SPEC QL NAA+PROBE: NOT DETECTED
HPIV1 RNA SPEC QL NAA+PROBE: NOT DETECTED
HPIV2 RNA SPEC QL NAA+PROBE: NOT DETECTED
HPIV3 RNA SPEC QL NAA+PROBE: NOT DETECTED
HPIV4 RNA SPEC QL NAA+PROBE: NOT DETECTED
HYALINE CASTS URNS QL MICRO: NORMAL /LPF (ref 0–5)
IMM GRANULOCYTES # BLD AUTO: 0 K/UL (ref 0–0.04)
IMM GRANULOCYTES NFR BLD AUTO: 0 % (ref 0–0.5)
KETONES UR QL STRIP.AUTO: NEGATIVE MG/DL
LEUKOCYTE ESTERASE UR QL STRIP.AUTO: NEGATIVE
LYMPHOCYTES # BLD: 0.8 K/UL (ref 0.8–3.5)
LYMPHOCYTES NFR BLD: 13 % (ref 12–49)
M PNEUMO DNA SPEC QL NAA+PROBE: NOT DETECTED
MCH RBC QN AUTO: 28.2 PG (ref 26–34)
MCHC RBC AUTO-ENTMCNC: 33.4 G/DL (ref 30–36.5)
MCV RBC AUTO: 84.5 FL (ref 80–99)
MONOCYTES # BLD: 0.7 K/UL (ref 0–1)
MONOCYTES NFR BLD: 12 % (ref 5–13)
NEUTS SEG # BLD: 4.2 K/UL (ref 1.8–8)
NEUTS SEG NFR BLD: 74 % (ref 32–75)
NITRITE UR QL STRIP.AUTO: NEGATIVE
NRBC # BLD: 0 K/UL (ref 0–0.01)
NRBC BLD-RTO: 0 PER 100 WBC
PH UR STRIP: 6 [PH] (ref 5–8)
PLATELET # BLD AUTO: 184 K/UL (ref 150–400)
PMV BLD AUTO: 10.7 FL (ref 8.9–12.9)
POTASSIUM SERPL-SCNC: 4.1 MMOL/L (ref 3.5–5.1)
PROCALCITONIN SERPL-MCNC: <0.05 NG/ML
PROT SERPL-MCNC: 7.7 G/DL (ref 6.4–8.2)
PROT UR STRIP-MCNC: NEGATIVE MG/DL
RBC # BLD AUTO: 3.93 M/UL (ref 3.8–5.2)
RBC #/AREA URNS HPF: NORMAL /HPF (ref 0–5)
RBC MORPH BLD: ABNORMAL
RBC MORPH BLD: ABNORMAL
RSV RNA SPEC QL NAA+PROBE: NOT DETECTED
RV+EV RNA SPEC QL NAA+PROBE: NOT DETECTED
SAMPLES BEING HELD,HOLD: NORMAL
SAMPLES BEING HELD,HOLD: NORMAL
SARS-COV-2 PCR, COVPCR: DETECTED
SODIUM SERPL-SCNC: 134 MMOL/L (ref 136–145)
SOURCE, COVRS: ABNORMAL
SP GR UR REFRACTOMETRY: 1.01 (ref 1–1.03)
TROPONIN-HIGH SENSITIVITY: 15 NG/L (ref 0–51)
TSH SERPL DL<=0.05 MIU/L-ACNC: 1.67 UIU/ML (ref 0.36–3.74)
UR CULT HOLD, URHOLD: NORMAL
UROBILINOGEN UR QL STRIP.AUTO: 0.2 EU/DL (ref 0.2–1)
WBC # BLD AUTO: 5.8 K/UL (ref 3.6–11)
WBC URNS QL MICRO: NORMAL /HPF (ref 0–4)

## 2022-10-16 PROCEDURE — 74011250636 HC RX REV CODE- 250/636: Performed by: EMERGENCY MEDICINE

## 2022-10-16 PROCEDURE — 84484 ASSAY OF TROPONIN QUANT: CPT

## 2022-10-16 PROCEDURE — 71045 X-RAY EXAM CHEST 1 VIEW: CPT

## 2022-10-16 PROCEDURE — G0378 HOSPITAL OBSERVATION PER HR: HCPCS

## 2022-10-16 PROCEDURE — 85025 COMPLETE CBC W/AUTO DIFF WBC: CPT

## 2022-10-16 PROCEDURE — 65270000046 HC RM TELEMETRY

## 2022-10-16 PROCEDURE — 96374 THER/PROPH/DIAG INJ IV PUSH: CPT

## 2022-10-16 PROCEDURE — 96375 TX/PRO/DX INJ NEW DRUG ADDON: CPT

## 2022-10-16 PROCEDURE — 36415 COLL VENOUS BLD VENIPUNCTURE: CPT

## 2022-10-16 PROCEDURE — 94762 N-INVAS EAR/PLS OXIMTRY CONT: CPT

## 2022-10-16 PROCEDURE — 0202U NFCT DS 22 TRGT SARS-COV-2: CPT

## 2022-10-16 PROCEDURE — 74011000250 HC RX REV CODE- 250: Performed by: INTERNAL MEDICINE

## 2022-10-16 PROCEDURE — 74011250637 HC RX REV CODE- 250/637: Performed by: EMERGENCY MEDICINE

## 2022-10-16 PROCEDURE — 81001 URINALYSIS AUTO W/SCOPE: CPT

## 2022-10-16 PROCEDURE — 99285 EMERGENCY DEPT VISIT HI MDM: CPT

## 2022-10-16 PROCEDURE — 74011250636 HC RX REV CODE- 250/636: Performed by: INTERNAL MEDICINE

## 2022-10-16 PROCEDURE — 84443 ASSAY THYROID STIM HORMONE: CPT

## 2022-10-16 PROCEDURE — 84145 PROCALCITONIN (PCT): CPT

## 2022-10-16 PROCEDURE — 87635 SARS-COV-2 COVID-19 AMP PRB: CPT

## 2022-10-16 PROCEDURE — 83880 ASSAY OF NATRIURETIC PEPTIDE: CPT

## 2022-10-16 PROCEDURE — 87040 BLOOD CULTURE FOR BACTERIA: CPT

## 2022-10-16 PROCEDURE — 86141 C-REACTIVE PROTEIN HS: CPT

## 2022-10-16 PROCEDURE — 80053 COMPREHEN METABOLIC PANEL: CPT

## 2022-10-16 RX ORDER — ACETAMINOPHEN 325 MG/1
650 TABLET ORAL
Status: DISCONTINUED | OUTPATIENT
Start: 2022-10-16 | End: 2022-10-19 | Stop reason: HOSPADM

## 2022-10-16 RX ORDER — ENOXAPARIN SODIUM 100 MG/ML
40 INJECTION SUBCUTANEOUS DAILY
Status: DISCONTINUED | OUTPATIENT
Start: 2022-10-17 | End: 2022-10-17

## 2022-10-16 RX ORDER — ONDANSETRON 4 MG/1
4 TABLET, ORALLY DISINTEGRATING ORAL
Status: DISCONTINUED | OUTPATIENT
Start: 2022-10-16 | End: 2022-10-19 | Stop reason: HOSPADM

## 2022-10-16 RX ORDER — IPRATROPIUM BROMIDE AND ALBUTEROL SULFATE 2.5; .5 MG/3ML; MG/3ML
3 SOLUTION RESPIRATORY (INHALATION)
Status: DISCONTINUED | OUTPATIENT
Start: 2022-10-16 | End: 2022-10-19 | Stop reason: HOSPADM

## 2022-10-16 RX ORDER — ACETAMINOPHEN 650 MG/1
650 SUPPOSITORY RECTAL
Status: DISCONTINUED | OUTPATIENT
Start: 2022-10-16 | End: 2022-10-19 | Stop reason: HOSPADM

## 2022-10-16 RX ORDER — ACETAMINOPHEN 500 MG
1000 TABLET ORAL ONCE
Status: COMPLETED | OUTPATIENT
Start: 2022-10-16 | End: 2022-10-16

## 2022-10-16 RX ORDER — ONDANSETRON 2 MG/ML
4 INJECTION INTRAMUSCULAR; INTRAVENOUS
Status: DISCONTINUED | OUTPATIENT
Start: 2022-10-16 | End: 2022-10-19 | Stop reason: HOSPADM

## 2022-10-16 RX ORDER — SODIUM CHLORIDE, SODIUM LACTATE, POTASSIUM CHLORIDE, CALCIUM CHLORIDE 600; 310; 30; 20 MG/100ML; MG/100ML; MG/100ML; MG/100ML
50 INJECTION, SOLUTION INTRAVENOUS CONTINUOUS
Status: DISCONTINUED | OUTPATIENT
Start: 2022-10-16 | End: 2022-10-17

## 2022-10-16 RX ORDER — SODIUM CHLORIDE 0.9 % (FLUSH) 0.9 %
5-40 SYRINGE (ML) INJECTION AS NEEDED
Status: DISCONTINUED | OUTPATIENT
Start: 2022-10-16 | End: 2022-10-19 | Stop reason: HOSPADM

## 2022-10-16 RX ORDER — SODIUM CHLORIDE 0.9 % (FLUSH) 0.9 %
5-40 SYRINGE (ML) INJECTION EVERY 8 HOURS
Status: DISCONTINUED | OUTPATIENT
Start: 2022-10-16 | End: 2022-10-19 | Stop reason: HOSPADM

## 2022-10-16 RX ADMIN — CEFTRIAXONE SODIUM 1 G: 1 INJECTION, POWDER, FOR SOLUTION INTRAMUSCULAR; INTRAVENOUS at 17:59

## 2022-10-16 RX ADMIN — SODIUM CHLORIDE 1000 ML: 9 INJECTION, SOLUTION INTRAVENOUS at 13:57

## 2022-10-16 RX ADMIN — SODIUM CHLORIDE, POTASSIUM CHLORIDE, SODIUM LACTATE AND CALCIUM CHLORIDE 50 ML/HR: 600; 310; 30; 20 INJECTION, SOLUTION INTRAVENOUS at 19:13

## 2022-10-16 RX ADMIN — AZITHROMYCIN DIHYDRATE 500 MG: 500 INJECTION, POWDER, LYOPHILIZED, FOR SOLUTION INTRAVENOUS at 19:10

## 2022-10-16 RX ADMIN — ACETAMINOPHEN 1000 MG: 500 TABLET ORAL at 13:57

## 2022-10-16 NOTE — H&P
[address] [facility] [place] [facility] Adult  Hospitalist Group  History and Physical    Date of Service:  10/16/2022  Primary Care Provider: Kj Ha MD  Source of information: Spouse/family member    Chief Complaint: Fatigue and Concern For COVID-19 (Coronavirus)      History of Presenting Illness:   Walter Coronado is a 80 y.o. female with known past medical history of dementia patient alert oriented to name and place at baseline she able to participate in conversation, and answer appropriate, hypertension, recurrent UTI who was brought into emergency department for evaluation of cough, shortness of breath, generalized weakness since Friday, today the patient was not able to ambulate with front wheel walker due to generalized weakness, he will appetite diminished significantly, he will family including her daughter she decrease was positive for COVID 19. In the emergency department patient was evaluated vital signs were obtained, the patient was febrile with temperature 100.8, she experienced shortness of breath and cough nonproductive. CBC white blood cells were within normal limit, urinalysis was negative for urinary tract infection, chest x-ray was significant for possible pulmonary edema. Currently the patient does not require any supplemental oxygen. Creatinine elevated is 1.45 at baseline creatinine 1. Blood culture were requested, medicine was consulted for admission and further evaluation. The patient denies any headache, blurry vision, sore throat, trouble swallowing, trouble with speech, chest pain, SOB, cough, fever, chills, N/V/D, abd pain, urinary symptoms, constipation, recent travels, sick contacts, focal or generalized neurological symptoms, falls, injuries, rashes, contact with COVID-19 diagnosed patients, hematemesis, melena, hemoptysis, hematuria, rashes, denies starting any new medications and denies any other concerns or problems besides as mentioned above.          REVIEW OF SYSTEMS:  A comprehensive review of systems was negative except for that written in the History of Present Illness. Past Medical History:   Diagnosis Date    Dementia     Endocrine disease     adrenal gland issues    Gastrointestinal disorder     C. Diff    Hypertension     Malaria     UTI (lower urinary tract infection)       Past Surgical History:   Procedure Laterality Date    HX ORTHOPAEDIC      ORIF arm     Prior to Admission medications    Medication Sig Start Date End Date Taking? Authorizing Provider   pantoprazole (PROTONIX) 40 mg tablet Take 1 Tab by mouth Before breakfast and dinner. 2/25/21   Zi Mesa MD   cholecalciferol (VITAMIN D3) 1,000 unit cap Take  by mouth daily. Indications: dose unknown    Provider, Historical   cranberry extract 450 mg tab tablet Take 450 mg by mouth. Provider, Historical   ondansetron (ZOFRAN ODT) 4 mg disintegrating tablet Take 1 Tab by mouth every eight (8) hours as needed for Nausea. 8/18/19   Ted Orlando MD   mirtazapine (REMERON) 7.5 mg tablet Take 7.5 mg by mouth nightly. Provider, Historical   ranitidine (ZANTAC) 75 mg tablet Take 150 mg by mouth two (2) times daily as needed for Indigestion. Provider, Historical   b complex vitamins tablet Take 1 Tab by mouth daily. Provider, Historical   QUEtiapine (SEROQUEL) 100 mg tablet Take 100 mg by mouth nightly. Provider, Historical   L. acidoph & paracasei- S therm- Bifido (VEDA-Q) 8 billion cell cap cap Take 1 Cap by mouth daily. Provider, Historical   donepezil (ARICEPT) 5 mg tablet Take 5 mg by mouth nightly. Provider, Historical   ascorbic acid (VITAMIN C) 500 mg tablet Take 500 mg by mouth daily.     Provider, Historical     Allergies   Allergen Reactions    Aspirin Other (comments)     Gi upset    Sulfa (Sulfonamide Antibiotics) Rash    Lidocaine Unknown (comments)     Novocaine    Novocain [Procaine] Unknown (comments)    Penicillins Nausea and Vomiting      No family history on file.   Social History:  reports that she has never smoked. She has never used smokeless tobacco. She reports that she does not drink alcohol and does not use drugs. Family and social history were personally reviewed, all pertinent and relevant details are outlined as above. Family history negative for diabetes, stroke, coronary artery disease. Objective:   Visit Vitals  BP (!) 110/56   Pulse (!) 58   Temp 98.9 °F (37.2 °C)   Resp 16   SpO2 93%      O2 Device: None (Room air)    PHYSICAL EXAM:   General: Alert x oriented x 2, awake, + weak, + cough, resting in bed, pleasant female, appears to be stated age,   HEENT: PEERL, EOMI, moist mucus membranes  Neck: Supple, no JVD, no meningeal signs  Chest: Coarse to auscultation bilaterally, rhonchi b/l  CVS: RRR, S1 S2 heard, no murmurs/rubs/gallops  Abd: Soft, non-tender, non-distended, +bowel sounds   Ext: No clubbing, no cyanosis, no edema  Neuro/Psych: Pleasant mood and affect, CN 2-12 grossly intact, sensory grossly within normal limit, Strength 5/5 in all extremities, DTR 1+ x 4  Cap refill: Brisk, less than 3 seconds  Pulses: 2+, symmetric in all extremities  Skin: Warm, dry, without rashes or lesions  malnutrition    Data Review: All diagnostic labs and studies have been reviewed.     Abnormal Labs Reviewed   COVID-19 RAPID TEST - Abnormal; Notable for the following components:       Result Value    COVID-19 rapid test Detected (*)     All other components within normal limits   CBC WITH AUTOMATED DIFF - Abnormal; Notable for the following components:    HGB 11.1 (*)     HCT 33.2 (*)     RDW 15.2 (*)     All other components within normal limits   METABOLIC PANEL, COMPREHENSIVE - Abnormal; Notable for the following components:    Sodium 134 (*)     Glucose 116 (*)     Creatinine 1.45 (*)     BUN/Creatinine ratio 10 (*)     eGFR 33 (*)     Albumin 3.4 (*)     Globulin 4.3 (*)     A-G Ratio 0.8 (*)     All other components within normal limits       All Micro Results       Procedure Component Value Units Date/Time    RESPIRATORY VIRUS PANEL W/COVID-19, PCR [[id number]] Collected: 10/16/22 1800    Order Status: Sent Specimen: NASOPHARYNGEAL SWAB Updated: 10/16/22 1801    CULTURE, BLOOD #1 [[id number]] Collected: 10/16/22 1729    Order Status: Sent Specimen: Blood Updated: 10/16/22 1756    RESPIRATORY VIRUS PANEL W/COVID-19, PCR [[id number]]     Order Status: Canceled Specimen: NASOPHARYNGEAL SWAB     CULTURE, BLOOD #2 [[id number]]     Order Status: Sent Specimen: Blood     URINE CULTURE HOLD SAMPLE [[id number]] Collected: 10/16/22 1514    Order Status: Completed Specimen: Urine from Serum Updated: 10/16/22 1522     Urine culture hold       Urine on hold in Microbiology dept for 2 days. If unpreserved urine is submitted, it cannot be used for addtional testing after 24 hours, recollection will be required. COVID-19 RAPID TEST [[id number]]  (Abnormal) Collected: 10/16/22 1251    Order Status: Completed Specimen: Nasopharyngeal Updated: 10/16/22 1331     Specimen source Nasopharyngeal        COVID-19 rapid test Detected        Comment: Rapid Abbott ID Now       The specimen is POSITIVE for SARS-CoV-2, the novel coronavirus associated with COVID-19. This test has been authorized by the FDA under an Emergency Use Authorization (EUA) for use by authorized laboratories. Fact sheet for Healthcare Providers:  http://www.haritha.breana/  Fact sheet for Patients: http://www.haritha.breana/       Methodology: Isothermal Nucleic Acid Amplification  CALLED TO AND READ BACK BY  [email or web address]                 IMAGING:   XR CHEST PORT   Final Result      Probable mild pulmonary interstitial edema.            ECG/ECHO:    Results for orders placed or performed during the hospital encounter of 02/22/21   EKG, 12 LEAD, INITIAL   Result Value Ref Range    Ventricular Rate 75 BPM    Atrial Rate 75 BPM    P-R Interval 146 ms    QRS Duration 68 ms    Q-T Interval 378 ms    QTC Calculation (Bezet) 422 ms    Calculated P Axis 97 degrees    Calculated R Axis -25 degrees    Calculated T Axis -4 degrees    Diagnosis       Normal sinus rhythm  Nonspecific T wave abnormality  When compared with ECG of 18-AUG-2019 19:24,  No significant change was found  Confirmed by Thalia Munoz M.D., Vannesa Mcfarlane ([id number]) on 2/23/2021 4:23:30 PM          Assessment:   Given the patient's current clinical presentation, there is a high level of concern for decompensation if discharged from the emergency department. Complex decision making was performed, which includes reviewing the patient's available past medical records, laboratory results, and imaging studies. Active Problems:    Pneumonia (10/16/2022)    Febrile illness, possible related to pneumonia and possible COVID-pneumonia    Shortness of breath, cough    COVID-19 positive, patient does not require supplemental oxygen currently    Dehydration    Nausea vomiting    Acute kidney injury related to dehydration, nausea vomiting, and poor p.o. intake    Hypertension, currently blood pressure on the low side    Dementia    Plan:     The patient will be admitted to medicine, telemetry unit, is inpatient and will require at least 2 midnight for inpatient treatment. IV fluids for hydration will be provided,  IV antibiotic for possible community-acquired pneumonia were initiated and will be continued, ceftriaxone and azithromycin.   Inhalers will be provided  Blood culture was obtained is still pending  Respiratory PCR was requested  Currently the patient does not require any supplemental oxygen and she is not qualify for steroids or remdesivir per policy which was discussed with pharmacist.  Physical therapy will evaluate patient  Patient will be kept on respiratory isolation  Diet will be initiated    In length discussed condition of the patient with patient daughter, discussed end-of-life care, CODE STATUS, per daughter requested patient to be DO NOT RESUSCITATE DO NOT INTUBATE, but continue medical management. Total time was spent to discuss end-of-life care and CODE STATUS was 16 minutes. DIET: ADULT DIET Easy to Chew   ISOLATION PRECAUTIONS: There are currently no Active Isolations  CODE STATUS: DNR   DVT PROPHYLAXIS: Lovenox  FUNCTIONAL STATUS PRIOR TO HOSPITALIZATION: Ambulatory and capable of self-care but relies on assistive devices (rolling walker/cane). Ambulatory status/function: Ambulates with assistance:  Cane   EARLY MOBILITY ASSESSMENT: Recommend an assessment from physical therapy and/or occupational therapy  ANTICIPATED DISCHARGE: 24-48 hours. ANTICIPATED DISPOSITION: Home with Home Healthcare  EMERGENCY CONTACT/SURROGATE DECISION MAKER: daughter    CRITICAL CARE WAS PERFORMED FOR THIS ENCOUNTER: NO.      Signed By: Yvonne Lundberg MD     October 16, 2022         Please note that this dictation may have been completed with Dragon, the [employer] voice recognition software. Quite often unanticipated grammatical, syntax, homophones, and other interpretive errors are inadvertently transcribed by the computer software. Please disregard these errors. Please excuse any errors that have escaped final proofreading.

## 2022-10-16 NOTE — ED TRIAGE NOTES
Patient arrives to ED, daughter reports pain with weakness and pain with urination since this morning. Daughter reports family tested + for covid last week.

## 2022-10-16 NOTE — ED PROVIDER NOTES
Patient is a 15-year-old female with history of dementia, adrenal gland disease, hypertension, UTI who presents with generalized weakness and concern for COVID-19 infection. Daughter is at bedside who reports that she normally lives with her sister, but the entire family tested positive for COVID. Daughter brought her over to her house, but patient started coughing overnight. She tried to take her to a testing center this morning, but patient was still generally weak that she was unable to ambulate to the car and fell to the ground prompting family calling EMS. There is no concern for altered mental status. Daughter reports that patient has also been complaining of burning with urination and has a history of UTIs. Patient was noted to have a fever on arrival to the ED. Past Medical History:   Diagnosis Date    Dementia     Endocrine disease     adrenal gland issues    Gastrointestinal disorder     C. Diff    Hypertension     Malaria     UTI (lower urinary tract infection)        Past Surgical History:   Procedure Laterality Date    HX ORTHOPAEDIC      ORIF arm         No family history on file.     Social History     Socioeconomic History    Marital status: UNKNOWN     Spouse name: Not on file    Number of children: Not on file    Years of education: Not on file    Highest education level: Not on file   Occupational History    Not on file   Tobacco Use    Smoking status: Never    Smokeless tobacco: Never   Substance and Sexual Activity    Alcohol use: No    Drug use: No    Sexual activity: Never   Other Topics Concern    Not on file   Social History Narrative    Not on file     Social Determinants of Health     Financial Resource Strain: Not on file   Food Insecurity: Not on file   Transportation Needs: Not on file   Physical Activity: Not on file   Stress: Not on file   Social Connections: Not on file   Intimate Partner Violence: Not on file   Housing Stability: Not on file         ALLERGIES: Aspirin, Sulfa (sulfonamide antibiotics), Lidocaine, Novocain [procaine], and Penicillins    Review of Systems   Constitutional:  Positive for fatigue and fever. Negative for chills. HENT:  Negative for drooling and nosebleeds. Eyes:  Negative for pain and itching. Respiratory:  Positive for cough. Negative for choking, shortness of breath, wheezing and stridor. Cardiovascular:  Negative for leg swelling. Gastrointestinal:  Negative for abdominal distention and rectal pain. Endocrine: Negative for heat intolerance and polyphagia. Genitourinary:  Positive for dysuria. Negative for enuresis and genital sores. Musculoskeletal:  Negative for arthralgias and joint swelling. Skin:  Negative for color change. Allergic/Immunologic: Negative for immunocompromised state. Neurological:  Negative for tremors and speech difficulty. Hematological:  Negative for adenopathy. Psychiatric/Behavioral:  Negative for dysphoric mood and sleep disturbance. Vitals:    10/16/22 1337 10/16/22 1400 10/16/22 1407 10/16/22 1430   BP: 133/63 (!) 143/76 136/61 (!) 122/98   Pulse: 77 81 76 75   Resp: 24 19 23 23   Temp:       SpO2: 92% 92%              Physical Exam  Vitals and nursing note reviewed. Constitutional:       Appearance: She is well-developed. She is ill-appearing. She is not toxic-appearing or diaphoretic. HENT:      Head: Normocephalic. Nose: Nose normal.   Eyes:      Conjunctiva/sclera: Conjunctivae normal.   Cardiovascular:      Rate and Rhythm: Normal rate and regular rhythm. Pulmonary:      Effort: Pulmonary effort is normal. No respiratory distress. Breath sounds: Normal breath sounds. Abdominal:      General: There is no distension. Palpations: Abdomen is soft. Tenderness: There is no abdominal tenderness. There is no right CVA tenderness or left CVA tenderness. Musculoskeletal:         General: No deformity. Normal range of motion.       Cervical back: Normal range of motion and neck supple. Skin:     General: Skin is warm and dry. Neurological:      Mental Status: She is alert. Mental status is at baseline. Coordination: Coordination normal.   Psychiatric:         Behavior: Behavior normal.        MDM  Number of Diagnoses or Management Options  COVID-19 virus infection  Generalized weakness  Diagnosis management comments: Pt 91% on RA on arrival. Daughter notes pt fell to the ground while trying to get to testing center this afternoon. No head trauma. Will admit for further management. Procedures    Perfect Serve Consult for Admission  4:42 PM    ED Room Number: ER20/20  Patient Name and age:  Pranav nj.o.  female  Working Diagnosis:   1. COVID-19 virus infection    2. Generalized weakness        COVID-19 Suspicion:  yes  Sepsis present:  no  Reassessment needed: no  Code Status:  Full Code  Readmission: no  Isolation Requirements:  no  Recommended Level of Care:  telemetry  Department:[facility] Adult ED - 21   Other:  pt with covid 19 infection, generalized weakness and unable to perform ADLs at home. Patient is being admitted to the hospital.  The results of their tests and reasons for their admission have been discussed with them and/or available family. They convey agreement and understanding for the need to be admitted and for their admission diagnosis.

## 2022-10-17 LAB
ALBUMIN SERPL-MCNC: 2.8 G/DL (ref 3.5–5)
ALBUMIN/GLOB SERPL: 0.8 {RATIO} (ref 1.1–2.2)
ALP SERPL-CCNC: 83 U/L (ref 45–117)
ALT SERPL-CCNC: 13 U/L (ref 12–78)
ANION GAP SERPL CALC-SCNC: 5 MMOL/L (ref 5–15)
AST SERPL-CCNC: 15 U/L (ref 15–37)
BASOPHILS # BLD: 0 K/UL (ref 0–0.1)
BASOPHILS NFR BLD: 1 % (ref 0–1)
BILIRUB SERPL-MCNC: 0.3 MG/DL (ref 0.2–1)
BUN SERPL-MCNC: 13 MG/DL (ref 6–20)
BUN/CREAT SERPL: 11 (ref 12–20)
CALCIUM SERPL-MCNC: 8.5 MG/DL (ref 8.5–10.1)
CHLORIDE SERPL-SCNC: 110 MMOL/L (ref 97–108)
CO2 SERPL-SCNC: 24 MMOL/L (ref 21–32)
COMMENT, HOLDF: NORMAL
CREAT SERPL-MCNC: 1.19 MG/DL (ref 0.55–1.02)
CRP SERPL-MCNC: 1.84 MG/DL (ref 0–0.6)
DIFFERENTIAL METHOD BLD: ABNORMAL
EOSINOPHIL # BLD: 0 K/UL (ref 0–0.4)
EOSINOPHIL NFR BLD: 1 % (ref 0–7)
ERYTHROCYTE [DISTWIDTH] IN BLOOD BY AUTOMATED COUNT: 15.3 % (ref 11.5–14.5)
GLOBULIN SER CALC-MCNC: 3.6 G/DL (ref 2–4)
GLUCOSE SERPL-MCNC: 85 MG/DL (ref 65–100)
HCT VFR BLD AUTO: 30.7 % (ref 35–47)
HGB BLD-MCNC: 10 G/DL (ref 11.5–16)
IMM GRANULOCYTES # BLD AUTO: 0 K/UL (ref 0–0.04)
IMM GRANULOCYTES NFR BLD AUTO: 0 % (ref 0–0.5)
INR PPP: 1.1 (ref 0.9–1.1)
LACTATE SERPL-SCNC: 0.7 MMOL/L (ref 0.4–2)
LYMPHOCYTES # BLD: 0.8 K/UL (ref 0.8–3.5)
LYMPHOCYTES NFR BLD: 27 % (ref 12–49)
MAGNESIUM SERPL-MCNC: 1.7 MG/DL (ref 1.6–2.4)
MCH RBC QN AUTO: 27.5 PG (ref 26–34)
MCHC RBC AUTO-ENTMCNC: 32.6 G/DL (ref 30–36.5)
MCV RBC AUTO: 84.6 FL (ref 80–99)
MONOCYTES # BLD: 0.5 K/UL (ref 0–1)
MONOCYTES NFR BLD: 18 % (ref 5–13)
NEUTS SEG # BLD: 1.7 K/UL (ref 1.8–8)
NEUTS SEG NFR BLD: 53 % (ref 32–75)
NRBC # BLD: 0 K/UL (ref 0–0.01)
NRBC BLD-RTO: 0 PER 100 WBC
PLATELET # BLD AUTO: 152 K/UL (ref 150–400)
PMV BLD AUTO: 10.8 FL (ref 8.9–12.9)
POTASSIUM SERPL-SCNC: 3.8 MMOL/L (ref 3.5–5.1)
PROCALCITONIN SERPL-MCNC: <0.05 NG/ML
PROT SERPL-MCNC: 6.4 G/DL (ref 6.4–8.2)
PROTHROMBIN TIME: 11.1 SEC (ref 9–11.1)
RBC # BLD AUTO: 3.63 M/UL (ref 3.8–5.2)
RBC MORPH BLD: ABNORMAL
RBC MORPH BLD: ABNORMAL
SAMPLES BEING HELD,HOLD: NORMAL
SODIUM SERPL-SCNC: 139 MMOL/L (ref 136–145)
WBC # BLD AUTO: 3 K/UL (ref 3.6–11)

## 2022-10-17 PROCEDURE — 97530 THERAPEUTIC ACTIVITIES: CPT

## 2022-10-17 PROCEDURE — 74011250636 HC RX REV CODE- 250/636: Performed by: INTERNAL MEDICINE

## 2022-10-17 PROCEDURE — 97165 OT EVAL LOW COMPLEX 30 MIN: CPT

## 2022-10-17 PROCEDURE — 83605 ASSAY OF LACTIC ACID: CPT

## 2022-10-17 PROCEDURE — 96372 THER/PROPH/DIAG INJ SC/IM: CPT

## 2022-10-17 PROCEDURE — 84145 PROCALCITONIN (PCT): CPT

## 2022-10-17 PROCEDURE — 83735 ASSAY OF MAGNESIUM: CPT

## 2022-10-17 PROCEDURE — 86140 C-REACTIVE PROTEIN: CPT

## 2022-10-17 PROCEDURE — 85610 PROTHROMBIN TIME: CPT

## 2022-10-17 PROCEDURE — G0378 HOSPITAL OBSERVATION PER HR: HCPCS

## 2022-10-17 PROCEDURE — 65270000046 HC RM TELEMETRY

## 2022-10-17 PROCEDURE — 97161 PT EVAL LOW COMPLEX 20 MIN: CPT

## 2022-10-17 PROCEDURE — 74011000250 HC RX REV CODE- 250: Performed by: INTERNAL MEDICINE

## 2022-10-17 PROCEDURE — 80053 COMPREHEN METABOLIC PANEL: CPT

## 2022-10-17 PROCEDURE — 74011250637 HC RX REV CODE- 250/637: Performed by: NURSE PRACTITIONER

## 2022-10-17 PROCEDURE — 97535 SELF CARE MNGMENT TRAINING: CPT

## 2022-10-17 PROCEDURE — 74011250636 HC RX REV CODE- 250/636: Performed by: FAMILY MEDICINE

## 2022-10-17 PROCEDURE — 85025 COMPLETE CBC W/AUTO DIFF WBC: CPT

## 2022-10-17 PROCEDURE — 36415 COLL VENOUS BLD VENIPUNCTURE: CPT

## 2022-10-17 RX ORDER — ASCORBIC ACID 500 MG
500 TABLET ORAL DAILY
Status: DISCONTINUED | OUTPATIENT
Start: 2022-10-17 | End: 2022-10-19 | Stop reason: HOSPADM

## 2022-10-17 RX ORDER — MELATONIN
1000 DAILY
Status: DISCONTINUED | OUTPATIENT
Start: 2022-10-18 | End: 2022-10-19 | Stop reason: HOSPADM

## 2022-10-17 RX ORDER — QUETIAPINE FUMARATE 100 MG/1
100 TABLET, FILM COATED ORAL
Status: DISCONTINUED | OUTPATIENT
Start: 2022-10-17 | End: 2022-10-19 | Stop reason: HOSPADM

## 2022-10-17 RX ORDER — ENOXAPARIN SODIUM 100 MG/ML
30 INJECTION SUBCUTANEOUS EVERY EVENING
Status: DISCONTINUED | OUTPATIENT
Start: 2022-10-17 | End: 2022-10-19 | Stop reason: HOSPADM

## 2022-10-17 RX ORDER — QUETIAPINE FUMARATE 100 MG/1
100 TABLET, FILM COATED ORAL
Status: DISCONTINUED | OUTPATIENT
Start: 2022-10-17 | End: 2022-10-17

## 2022-10-17 RX ORDER — PANTOPRAZOLE SODIUM 40 MG/1
40 TABLET, DELAYED RELEASE ORAL
Status: DISCONTINUED | OUTPATIENT
Start: 2022-10-18 | End: 2022-10-19 | Stop reason: HOSPADM

## 2022-10-17 RX ORDER — DONEPEZIL HYDROCHLORIDE 5 MG/1
5 TABLET, FILM COATED ORAL
Status: DISCONTINUED | OUTPATIENT
Start: 2022-10-17 | End: 2022-10-19 | Stop reason: HOSPADM

## 2022-10-17 RX ADMIN — DONEPEZIL HYDROCHLORIDE 5 MG: 5 TABLET, FILM COATED ORAL at 21:51

## 2022-10-17 RX ADMIN — OXYCODONE HYDROCHLORIDE AND ACETAMINOPHEN 500 MG: 500 TABLET ORAL at 13:27

## 2022-10-17 RX ADMIN — ENOXAPARIN SODIUM 30 MG: 100 INJECTION SUBCUTANEOUS at 17:58

## 2022-10-17 RX ADMIN — SODIUM CHLORIDE, PRESERVATIVE FREE 10 ML: 5 INJECTION INTRAVENOUS at 13:29

## 2022-10-17 RX ADMIN — SODIUM CHLORIDE, PRESERVATIVE FREE 10 ML: 5 INJECTION INTRAVENOUS at 21:20

## 2022-10-17 RX ADMIN — SODIUM CHLORIDE, POTASSIUM CHLORIDE, SODIUM LACTATE AND CALCIUM CHLORIDE 50 ML/HR: 600; 310; 30; 20 INJECTION, SOLUTION INTRAVENOUS at 10:21

## 2022-10-17 NOTE — PROGRESS NOTES
[facility] Adult  Hospitalist Group                                                                                          Hospitalist Progress Note  Kalpana Xiao NP  Answering service: [phone] OR [phone] from in house phone        Date of Service:  10/17/2022  NAME:  Rob Castro  :  3/15/1927  MRN:  [id number]      Admission Summary:   Per H&P, Rob Castro is a 80 y.o. female with known past medical history of dementia patient alert oriented to name and place at baseline she able to participate in conversation, and answer appropriate, hypertension, recurrent UTI who was brought into emergency department for evaluation of cough, shortness of breath, generalized weakness since Friday, today the patient was not able to ambulate with front wheel walker due to generalized weakness, he will appetite diminished significantly, he will family including her daughter she decrease was positive for COVID 19. In the emergency department patient was evaluated vital signs were obtained, the patient was febrile with temperature 100.8, she experienced shortness of breath and cough nonproductive. CBC white blood cells were within normal limit, urinalysis was negative for urinary tract infection, chest x-ray was significant for possible pulmonary edema. Currently the patient does not require any supplemental oxygen. Creatinine elevated is 1.45 at baseline creatinine 1. Blood culture were requested, medicine was consulted for admission and further evaluation.      The patient denies any headache, blurry vision, sore throat, trouble swallowing, trouble with speech, chest pain, SOB, cough, fever, chills, N/V/D, abd pain, urinary symptoms, constipation, recent travels, sick contacts, focal or generalized neurological symptoms, falls, injuries, rashes, contact with COVID-19 diagnosed patients, hematemesis, melena, hemoptysis, hematuria, rashes, denies starting any new medications and denies any other concerns or problems besides as mentioned above. Interval history / Subjective:     I saw the patient this morning on rounds. No complaints the moment of the encounter     Assessment & Plan:         COVID  Has tested positive for COVID-19. Patient not requiring oxygen, no need for dexamethasone at this point  CRP is mildly elevated  Continuing empiric antibiotics for pneumonia  Previous procalcitonin was unremarkable, will check 1 more today and if this is within normal limits we will discontinue antibiotics    Dementia  Patient with significant confusion. Family staying at the bedside  Continuing donepezil  Continuing quetiapine     Code status: DNR  Prophylaxis: LMWH  Care Plan discussed with: Patient, family  Anticipated Disposition: If remains stable, likely discharge tomorrow     Hospital Problems  Date Reviewed: 11/6/2019            Codes Class Noted POA    Pneumonia ICD-10-CM: J18.9  ICD-9-CM: 486  10/16/2022 Unknown             Review of Systems:   A comprehensive review of systems was negative except for that written in the HPI. Vital Signs:    Last 24hrs VS reviewed since prior progress note. Most recent are:  Visit Vitals  /62   Pulse 62   Temp 98.9 °F (37.2 °C)   Resp 17   SpO2 94%         Intake/Output Summary (Last 24 hours) at 10/17/2022 9302  Last data filed at 10/16/2022 1713  Gross per 24 hour   Intake 1000 ml   Output --   Net 1000 ml        Physical Examination:     I had a face to face encounter with this patient and independently examined them on 10/17/2022 as outlined below:          Constitutional:  No acute distress, cooperative, pleasant    ENT:  Oral mucosa moist, oropharynx benign. Resp:  CTA bilaterally. No wheezing/rhonchi/rales. No accessory muscle use. CV:  Regular rhythm, normal rate, no murmurs, gallops, rubs    GI:  Soft, non distended, non tender.  normoactive bowel sounds, no hepatosplenomegaly     Musculoskeletal:      No edema, warm, 2+ pulses throughout    Neurologic:  Moves all extremities. AAOx1            Data Review:    Review and/or order of clinical lab test  Review and/or order of tests in the radiology section of [facility]      Labs:     Recent Labs     10/17/22  0328 10/16/22  1256   WBC 3.0* 5.8   HGB 10.0* 11.1*   HCT 30.7* 33.2*    184     Recent Labs     10/17/22  0328 10/16/22  1256    134*   K 3.8 4.1   * 103   CO2 24 24   BUN 13 15   CREA 1.19* 1.45*   GLU 85 116*   CA 8.5 9.2   MG 1.7  --      Recent Labs     10/17/22  0328 10/16/22  1256   ALT 13 14   AP 83 100   TBILI 0.3 0.6   TP 6.4 7.7   ALB 2.8* 3.4*   GLOB 3.6 4.3*     Recent Labs     10/17/22  0328   INR 1.1   PTP 11.1      No results for input(s): FE, TIBC, PSAT, FERR in the last 72 hours. Lab Results   Component Value Date/Time    Folate 75.0 (H) 02/22/2021 07:06 PM      No results for input(s): PH, PCO2, PO2 in the last 72 hours. No results for input(s): CPK, CKNDX, TROIQ in the last 72 hours.     No lab exists for component: CPKMB  No results found for: CHOL, CHOLX, CHLST, CHOLV, HDL, HDLP, LDL, LDLC, DLDLP, TGLX, TRIGL, TRIGP, CHHD, CHHDX  No results found for: [facility]  Lab Results   Component Value Date/Time    Color YELLOW/STRAW 10/16/2022 03:14 PM    Appearance CLEAR 10/16/2022 03:14 PM    Specific gravity 1.014 10/16/2022 03:14 PM    pH (UA) 6.0 10/16/2022 03:14 PM    Protein Negative 10/16/2022 03:14 PM    Glucose Negative 10/16/2022 03:14 PM    Ketone Negative 10/16/2022 03:14 PM    Bilirubin Negative 10/16/2022 03:14 PM    Urobilinogen 0.2 10/16/2022 03:14 PM    Nitrites Negative 10/16/2022 03:14 PM    Leukocyte Esterase Negative 10/16/2022 03:14 PM    Epithelial cells FEW 10/16/2022 03:14 PM    Bacteria Negative 10/16/2022 03:14 PM    WBC 0-4 10/16/2022 03:14 PM    RBC 0-5 10/16/2022 03:14 PM         Medications Reviewed:     Current Facility-Administered Medications   Medication Dose Route Frequency    sodium chloride (NS) flush 5-40 mL  5-40 mL IntraVENous Q8H    sodium chloride (NS) flush 5-40 mL  5-40 mL IntraVENous PRN    acetaminophen (TYLENOL) tablet 650 mg  650 mg Oral Q6H PRN    Or    acetaminophen (TYLENOL) suppository 650 mg  650 mg Rectal Q6H PRN    ondansetron (ZOFRAN ODT) tablet 4 mg  4 mg Oral Q8H PRN    Or    ondansetron (ZOFRAN) injection 4 mg  4 mg IntraVENous Q6H PRN    enoxaparin (LOVENOX) injection 40 mg  40 mg SubCUTAneous DAILY    cefTRIAXone (ROCEPHIN) 1 g in 0.9% sodium chloride 10 mL IV syringe  1 g IntraVENous Q24H    azithromycin (ZITHROMAX) 500 mg in 0.9% sodium chloride 250 mL ([id number])  500 mg IntraVENous Q24H    lactated Ringers infusion  50 mL/hr IntraVENous CONTINUOUS    albuterol-ipratropium (DUO-NEB) 2.5 MG-0.5 MG/3 ML  3 mL Nebulization Q6H PRN    sodium chloride 0.9 % bolus infusion 250 mL  250 mL IntraVENous ONCE     Current Outpatient Medications   Medication Sig    pantoprazole (PROTONIX) 40 mg tablet Take 1 Tab by mouth Before breakfast and dinner. cholecalciferol (VITAMIN D3) 1,000 unit cap Take  by mouth daily. Indications: dose unknown    cranberry extract 450 mg tab tablet Take 450 mg by mouth. ondansetron (ZOFRAN ODT) 4 mg disintegrating tablet Take 1 Tab by mouth every eight (8) hours as needed for Nausea. mirtazapine (REMERON) 7.5 mg tablet Take 7.5 mg by mouth nightly. ranitidine (ZANTAC) 75 mg tablet Take 150 mg by mouth two (2) times daily as needed for Indigestion. b complex vitamins tablet Take 1 Tab by mouth daily. QUEtiapine (SEROQUEL) 100 mg tablet Take 100 mg by mouth nightly. L. acidoph & paracasei- S therm- Bifido (VEDA-Q) 8 billion cell cap cap Take 1 Cap by mouth daily. donepezil (ARICEPT) 5 mg tablet Take 5 mg by mouth nightly. ascorbic acid (VITAMIN C) 500 mg tablet Take 500 mg by mouth daily.      ______________________________________________________________________  EXPECTED LENGTH OF STAY: - - -  ACTUAL LENGTH OF STAY:          [id number] Casey Coleta, NP

## 2022-10-17 NOTE — PROGRESS NOTES
Spiritual Care Assessment/Progress Note  [facility]      NAME: Kyra Cortes      MRN: [id number]  AGE: 80 y.o. SEX: female  Mandaeism Affiliation: Mosque   Language: [place]     10/17/2022     Total Time (in minutes): 15     Spiritual Assessment begun in [facility] through conversation with:         []Patient        [] Family    [] Friend(s)        Reason for Consult: Palliative Care, Initial/Spiritual Assessment     Spiritual beliefs: (Please include comment if needed)     [] Identifies with a yaneli tradition:         [] Supported by a yaneli community:            [] Claims no spiritual orientation:           [] Seeking spiritual identity:                [] Adheres to an individual form of spirituality:           [x] Not able to assess:                           Identified resources for coping:      [] Prayer                               [] Music                  [] Guided Imagery     [] Family/friends                 [] Pet visits     [] Devotional reading                         [x] Unknown     [] Other:                                               Interventions offered during this visit: (See comments for more details)    Patient Interventions: Initial visit           Plan of Care:     [] Support spiritual and/or cultural needs    [] Support AMD and/or advance care planning process      [] Support grieving process   [] Coordinate Rites and/or Rituals    [] Coordination with community clergy   [] No spiritual needs identified at this time   [] Detailed Plan of Care below (See Comments)  [] Make referral to Music Therapy  [] Make referral to Pet Therapy     [] Make referral to Addiction services  [] Make referral to [facility]  [] Make referral to Spiritual Care Partner  [] No future visits requested        [x] Contact Spiritual Care for further referrals     Attempted visit for palliative initial spiritual assessment. Unable to visit patient at this time.  Patient was sleeping and did not awake. No family present. Patient's chart was consulted.   Chaplain eRbolledo MDiv, MS, [facility]

## 2022-10-17 NOTE — PROGRESS NOTES
Occupational Therapy  10/17/22    Order acknowledged, chart reviewed. Attempted to see for OT evalaution however patient LORI, transferring to new room upon time of attempt. Will follow up later today as able/appropriate.     Thank you,  My Chacko, OTD, OTR/L

## 2022-10-17 NOTE — PROGRESS NOTES
Transitions of care:  Expect pt to return home with family tomorrow pending continued medical stability. Reason for Admission:  PNA, covid positive                     RUR Score:    9%                 Plan for utilizing home health:      no new needs anticipated at this time. PCP: First and Last name:  Aliyah Weaver MD     Name of Practice:    Are you a current patient: Yes/No: yes   Approximate date of last visit:    Can you participate in a virtual visit with your PCP:                     Current Advanced Directive/Advance Care Plan: DNR      Healthcare Decision Maker:                Primary Decision Maker: Marlee Garay Child - [phone]    Secondary Decision Maker: Gil Mensah - Child - [phone]                  Transition of Care Plan:      Expect pt to be discharged home at this time. Cm met with pt's son in law outside of the room (pt's covid positive) and son in law confirmed that pt lives in their home, his wife is her primary caregiver in addition to a CNA who comes in daily from 9am-4pm.  Son in law confirmed they are hopeful pt to discharge tomorrow. No concerns expressed. Medicare pt's son in law has received, reviewed, and signed 1st IM letter informing them of their right to appeal the discharge. Signed copied has been placed on pt bedside chart. Mary Rea [facility]  Care Manager   [phone]    Care Management Interventions  PCP Verified by CM:  Yes  Support Systems: Child(asha), Other Family Member(s)  Confirm Follow Up Transport: Family  The Plan for Transition of Care is Related to the Following Treatment Goals : return home  The Patient and/or Patient Representative was Provided with a Choice of Provider and Agrees with the Discharge Plan?: Yes  Discharge Location  Patient Expects to be Discharged to[de-identified] Home

## 2022-10-17 NOTE — PROGRESS NOTES
Physician Progress Note      PATIENT:               Alda Renner  CSN #:                  [id number]  :                       3/15/1927  ADMIT DATE:       10/16/2022 12:38 PM  DISCH DATE:  RESPONDING  PROVIDER #:        Judit MARTINEZ NP          QUERY TEXT:    Pt admitted with Covid. Pt noted to have pneumonia . If possible, please document in the progress notes and discharge summary if you are evaluating and/or treating any of the following:    Note: CAP and HCAP indicate where the pneumonia was acquired, not a specific type. The medical record reflects the following:  Risk Factors: Covid  Clinical Indicators:  CXR  MPRESSION? Probable mild pulmonary interstitial edema. 10/16  SARS-CoV-2, PCR NOTD   Detected    Treatment: rocephin IV, zithromax IV    Thank you,  Eneida Awad RN, CDI, CRCR, CCDS  Certified Clinical Documentation Integrity  Specialist  [phone]  You can also contact me via [address] [place] [address]. Options provided:  -- Covid pneumonia  -- Aspiration pneumonia  -- Gram negative pneumonia  -- Gram positive pneumonia  -- MRSA pneumonia  -- MSSA pneumonia  -- Other - I will add my own diagnosis  -- Disagree - Not applicable / Not valid  -- Disagree - Clinically unable to determine / Unknown  -- Refer to Clinical Documentation Reviewer    PROVIDER RESPONSE TEXT:    This patient has Covid pneumonia.     Query created by: Marta Jang on 10/17/2022 2:18 PM      Electronically signed by:  Asaf Giles NP 10/17/2022 2:41 PM

## 2022-10-17 NOTE — PROGRESS NOTES
Problem: Self Care Deficits Care Plan (Adult)  Goal: *Acute Goals and Plan of Care (Insert Text)  Description: FUNCTIONAL STATUS PRIOR TO ADMISSION: Patient lives with her daughter and son in law, completes ADLs with assist from a caregiver during the day & daughter at night, ambulating with SPC with SPV. Speaks [place] with very limited [place]. HOME SUPPORT: The patient lived with her daughter & son in law, caregiver assist during the daytime. Occupational Therapy Goals  Initiated 10/17/2022  1. Patient will perform grooming at the sink with minimal assistance/contact guard assist within 7 day(s). 2.  Patient will perform bathing with minimal assistance/contact guard assist within 7 day(s). 3.  Patient will perform lower body dressing with minimal assistance/contact guard assist within 7 day(s). 4.  Patient will perform toilet transfers with minimal assistance/contact guard assist within 7 day(s). 5.  Patient will perform all aspects of toileting with minimal assistance/contact guard assist within 7 day(s). 6.  Patient will participate in upper extremity therapeutic exercise/activities with supervision/set-up for 10 minutes within 7 day(s). 7.  Patient will utilize energy conservation techniques during functional activities with verbal, visual, and tactile cues within 7 day(s). Outcome: Not Met     OCCUPATIONAL THERAPY EVALUATION  Patient: Alan Minor (44 y.o. female)  Date: 10/17/2022  Primary Diagnosis: Pneumonia [J18.9]       Precautions: Fall (Droplet+)    ASSESSMENT  Based on the objective data described below, the patient presents with decreased balance, activity tolerance, distal lower body access, coordination, safety awareness, insight into deficits, and strength s/p admission for GLF, debility, and pulmonary edema. At baseline, she ambulates with a SPC, requires assist for ADLs/IADLs and has family/caregiver assist 24/7.  She now presents slightly below her baseline, requiring Min-Mod A for ADLs and brief OOB mobility with SPC. Patient pleasant and willing to participate, reporting need for toileting after transferring to the chair with Mod A & SPC. Completed BSC transfer & toileting with Mod A, returned to the chair with all needs met, son in law at bedside. Anticipate good progress, recommend d/c home with [facility] & family assistance. Current Level of Function Impacting Discharge (ADLs/self-care): Min-Mod A for ADLs and brief OOB mobility with [facility]    Functional Outcome Measure: The patient scored Total: 45/100 on the Barthel Index outcome measure which is indicative of being partially dependent in basic self-care. Other factors to consider for discharge: fall risk, PMH, PLOF, COVID+     Patient will benefit from skilled therapy intervention to address the above noted impairments. PLAN :  Recommendations and Planned Interventions: self care training, functional mobility training, therapeutic exercise, balance training, therapeutic activities, cognitive retraining, endurance activities, patient education, home safety training, and family training/education    Frequency/Duration: Patient will be followed by occupational therapy 3 times a week to address goals. Recommendation for discharge: (in order for the patient to meet his/her long term goals)  Occupational therapy at least 2 days/week in the home AND ensure assist and/or supervision for safety with ADLs/IADLs and mobility    This discharge recommendation:  Has been made in collaboration with the attending provider and/or case management    IF patient discharges home will need the following DME: may benefit from [facility], shower chair, and transport wheelchair if not already at home       SUBJECTIVE:   Patient stated No, I'm okay.  e: when asked about pain    OBJECTIVE DATA SUMMARY:   HISTORY:   Past Medical History:   Diagnosis Date    Dementia     Endocrine disease     adrenal gland issues    Gastrointestinal disorder C. Diff    Hypertension     Malaria     UTI (lower urinary tract infection)      Past Surgical History:   Procedure Laterality Date    HX ORTHOPAEDIC      ORIF arm       Expanded or extensive additional review of patient history:     Home Situation  Home Environment: Private residence  # Steps to Enter: 4  Rails to Enter: Yes  One/Two Story Residence: One story  Living Alone: No  Support Systems: Child(asha)  Patient Expects to be Discharged to[de-identified] Home  Current DME Used/Available at Home: Cane, straight, Shower chair, Commode, bedside  Tub or Shower Type: Shower    EXAMINATION OF PERFORMANCE DEFICITS:  Cognitive/Behavioral Status:  Neurologic State: Alert  Orientation Level: Appropriate for age  Cognition: Follows commands  Perception: Appears intact  Perseveration: No perseveration noted  Safety/Judgement: Awareness of environment;Decreased awareness of need for safety;Decreased awareness of need for assistance;Decreased insight into deficits    Skin: Appears intact    Edema: None    Hearing: Auditory  Auditory Impairment: None    Vision/Perceptual:    Tracking: Able to track stimulus in all quadrants w/o difficulty                 Diplopia: No              Range of Motion:  AROM: Generally decreased, functional                         Strength:  Strength: Generally decreased, functional                Coordination:  Coordination: Generally decreased, functional  Fine Motor Skills-Upper: Left Intact; Right Intact    Gross Motor Skills-Upper: Left Impaired;Right Impaired       Balance:  Sitting: Impaired  Sitting - Static: Good (unsupported)  Sitting - Dynamic: Fair (occasional)  Standing: Impaired; With support (SPC in R hand)  Standing - Static: Fair;Constant support  Standing - Dynamic : Fair;Poor;Constant support    Functional Mobility and Transfers for ADLs:  Bed Mobility:  Supine to Sit: Minimum assistance  Sit to Supine:  (in chair)  Scooting: Minimum assistance; Moderate assistance;Assist x1 (EOB/recliner)    Transfers:  Sit to Stand: Moderate assistance;Minimum assistance;Assist x1;Adaptive equipment (initial Mod A, progressed to Min A with SPC)  Stand to Sit: Minimum assistance;Assist x1;Adaptive equipment [facility])  Bed to Chair: Moderate assistance;Assist x1;Adaptive equipment [facility])  Toilet Transfer : Moderate assistance;Assist x1;Adaptive equipment (to/from [facility] with [facility])  Assistive Device : Gait Belt;Cane, straight    ADL Assessment:  Feeding: Setup    Oral Facial Hygiene/Grooming: Setup    Bathing: Moderate assistance         Upper Body Dressing: Minimum assistance    Lower Body Dressing: Moderate assistance    Toileting: Moderate assistance                ADL Intervention and task modifications:     Lower Body Dressing Assistance  Dressing Assistance: Moderate assistance  Protective Undergarmet: Moderate assistance  Socks: Maximum assistance  Slip on Shoes with Back: Moderate assistance  Leg Crossed Method Used: No  Position Performed: Seated edge of bed;Seated in chair;Standing  Cues: Physical assistance; Tactile cues provided;Verbal cues provided;Visual cues provided    Toileting  Toileting Assistance: Moderate assistance  Bladder Hygiene: Minimum assistance  Clothing Management: Moderate assistance  Cues: Physical assistance for pants up;Physical assistance for pants down;Verbal cues provided; Tactile cues provided;Visual cues provided  Adaptive Equipment:  (BSC, SPC)    Cognitive Retraining  Safety/Judgement: Awareness of environment;Decreased awareness of need for safety;Decreased awareness of need for assistance;Decreased insight into deficits    Patient instructed and indicated understanding the benefits of maintaining activity tolerance, functional mobility, and independence with self care tasks during acute stay  to ensure safe return home and to baseline.  Encouraged patient to increase frequency and duration OOB, be out of bed for all meals, perform daily ADLs (as approved by RN/MD regarding bathing etc), and performing functional mobility to/from [facility] with staff assist.    Functional Measure:    Barthel Index:  Bathin  Bladder: 5  Bowels: 5  Groomin  Dressin  Feeding: 10  Mobility: 0  Stairs: 0  Toilet Use: 5  Transfer (Bed to Chair and Back): 10  Total: 45/100      The Barthel ADL Index: Guidelines  1. The index should be used as a record of what a patient does, not as a record of what a patient could do. 2. The main aim is to establish degree of independence from any help, physical or verbal, however minor and for whatever reason. 3. The need for supervision renders the patient not independent. 4. A patient's performance should be established using the best available evidence. Asking the patient, friends/relatives and nurses are the usual sources, but direct observation and common sense are also important. However direct testing is not needed. 5. Usually the patient's performance over the preceding 24-48 hours is important, but occasionally longer periods will be relevant. 6. Middle categories imply that the patient supplies over 50 per cent of the effort. 7. Use of aids to be independent is allowed. Score Interpretation (from [address] [address])    Independent   60-79 Minimally independent   40-59 Partially dependent   20-39 Very dependent   <20 Totally dependent     -Arleen Weber., Barthel, D.W. (1965). Functional evaluation: the Barthel Index. [address] [facility] ([address] [place] (1997). The Barthel activities of daily living index: self-reporting versus actual performance in the old (> or = 75 years). Journal of [address] [place] [address] 45(7), [address] [facility], HunterMARIBEL, Theodore Navas., Sachin Hernandez. (1999). Measuring the change in disability after inpatient rehabilitation; comparison of the responsiveness of the Barthel Index and Functional [place] Measure. Journal of Neurology, Neurosurgery, and Psychiatry, 66(4), 154-760.   Alex Appl, CHAYA, LISSETTE Blankenship, & Jorden Homans, M.A. (2004) Assessment of post-stroke quality of life in cost-effectiveness studies: The usefulness of the Barthel Index and the EuroQoL-5D. Quality of Life Research, 15, 234-32     Occupational Therapy Evaluation Charge Determination   History Examination Decision-Making   LOW Complexity : Brief history review  LOW Complexity : 1-3 performance deficits relating to physical, cognitive , or psychosocial skils that result in activity limitations and / or participation restrictions  LOW Complexity : No comorbidities that affect functional and no verbal or physical assistance needed to complete eval tasks       Based on the above components, the patient evaluation is determined to be of the following complexity level: LOW   Pain Rating:  None reported    Activity Tolerance:   Fair    After treatment patient left in no apparent distress:    Sitting in chair, Call bell within reach, and Caregiver / family present    COMMUNICATION/EDUCATION:   The patients plan of care was discussed with: Physical therapist and Registered nurse. Home safety education was provided and the patient/caregiver indicated understanding., Patient/family have participated as able in goal setting and plan of care. , and Patient/family agree to work toward stated goals and plan of care. This patients plan of care is appropriate for delegation to [facility].     Thank you for this referral.  NORMAN Hickman, OTR/L  Time Calculation: 20 mins

## 2022-10-17 NOTE — PROGRESS NOTES
Orders received, chart reviewed and patient evaluated by physical therapy. Pending progression with skilled acute physical therapy, recommend:  Physical therapy at least 2 days/week in the home AND ensure assist and/or supervision for safety with mobility    Full evaluation to follow.

## 2022-10-17 NOTE — ED NOTES
Has a final transfer review been performed? Yes    Reason for Admission: COVID positive  Patient comes from: home  Mental Status: Confused  ADL:total assistance  Ambulation:mild difficulty  Pertinent Info/Safety Concerns: Patient has dementia and is [place]ian speaking. She can transfer to the bedside commode with assist x 1. Family reports someone will be at bedside with her during her stay to help.    COVID Status: Positive  MEWS Score: 2       Vitals:    10/16/22 2100 10/17/22 0000 10/17/22 0430 10/17/22 0527   BP: (!) 95/43 (!) 115/57 128/62    Pulse: (!) 55 (!) 56 60 62   Resp: 17      Temp:       SpO2:    94%     Lines:   Peripheral IV 10/16/22 Right Antecubital (Active)   Site Assessment Clean, dry, & intact 10/16/22 1256   Phlebitis Assessment 0 10/16/22 1256   Infiltration Assessment 0 10/16/22 1256   Dressing Status Clean, dry, & intact 10/16/22 1256   Hub Color/Line Status Pink;Capped 10/16/22 1256   Action Taken Blood drawn 10/16/22 1256      Transport mode:ED stretcher    Dayanara Walls  being transferred to [facility](unit) for routine progression of care     \"Notification of etransfer note given to (Name) Rama (Title) staff

## 2022-10-17 NOTE — PROGRESS NOTES
Problem: Airway Clearance - Ineffective  Goal: Achieve or maintain patent airway  Outcome: Progressing Towards Goal     Problem: Gas Exchange - Impaired  Goal: Absence of hypoxia  Outcome: Progressing Towards Goal  Goal: Promote optimal lung function  Outcome: Progressing Towards Goal     Problem: Breathing Pattern - Ineffective  Goal: Ability to achieve and maintain a regular respiratory rate  Outcome: Progressing Towards Goal     Problem:  Body Temperature -  Risk of, Imbalanced  Goal: Ability to maintain a body temperature within defined limits  Outcome: Progressing Towards Goal  Goal: Will regain or maintain usual level of consciousness  Outcome: Progressing Towards Goal  Goal: Complications related to the disease process, condition or treatment will be avoided or minimized  Outcome: Progressing Towards Goal     Problem: Isolation Precautions - Risk of Spread of Infection  Goal: Prevent transmission of infectious organism to others  Outcome: Progressing Towards Goal     Problem: Nutrition Deficits  Goal: Optimize nutrtional status  Outcome: Progressing Towards Goal     Problem: Risk for Fluid Volume Deficit  Goal: Maintain normal heart rhythm  Outcome: Progressing Towards Goal  Goal: Maintain absence of muscle cramping  Outcome: Progressing Towards Goal  Goal: Maintain normal serum potassium, sodium, calcium, phosphorus, and pH  Outcome: Progressing Towards Goal     Problem: Loneliness or Risk for Loneliness  Goal: Demonstrate positive use of time alone when socialization is not possible  Outcome: Progressing Towards Goal     Problem: Fatigue  Goal: Verbalize increase energy and improved vitality  Outcome: Progressing Towards Goal     Problem: Patient Education: Go to Patient Education Activity  Goal: Patient/Family Education  Outcome: Progressing Towards Goal     Problem: Risk for Spread of Infection  Goal: Prevent transmission of infectious organism to others  Description: Prevent the transmission of infectious organisms to other patients, staff members, and visitors. Outcome: Progressing Towards Goal     Problem: Patient Education:  Go to Education Activity  Goal: Patient/Family Education  Outcome: Progressing Towards Goal     Problem: Patient Education: Go to Patient Education Activity  Goal: Patient/Family Education  Outcome: Progressing Towards Goal     Problem: Patient Education: Go to Patient Education Activity  Goal: Patient/Family Education  Outcome: Progressing Towards Goal     Problem: Falls - Risk of  Goal: *Absence of Falls  Description: Document [place] Fall Risk and appropriate interventions in the flowsheet.   Outcome: Progressing Towards Goal  Note: Fall Risk Interventions:  Mobility Interventions: Patient to call before getting OOB, Bed/chair exit alarm    Mentation Interventions: Bed/chair exit alarm, Family/sitter at bedside                        Problem: Patient Education: Go to Patient Education Activity  Goal: Patient/Family Education  Outcome: Progressing Towards Goal

## 2022-10-17 NOTE — PROGRESS NOTES
Problem: Mobility Impaired (Adult and Pediatric)  Goal: *Acute Goals and Plan of Care (Insert Text)  Outcome: Not Met     Problem: Mobility Impaired (Adult and Pediatric)  Goal: *Acute Goals and Plan of Care (Insert Text)  Description: FUNCTIONAL STATUS PRIOR TO ADMISSION: The patient ambulated with a single point cane inside the home, relies on caregiver assist ambulating stairs, outside the home, bathing, dressing, meals. Speaks [place] w/ very limited [place]. HOME SUPPORT PRIOR TO ADMISSION: The patient lived with her daughter and son in law, has private duty caregivers during the day, daughter assists at night. Physical Therapy Goals  Initiated 10/17/2022  1. Patient will move from supine to sit and sit to supine  in bed with supervision within 7 day(s). 2.  Patient will transfer from bed to chair and chair to bed with supervision using the least restrictive device within 7 day(s). 3.  Patient will perform sit to stand with supervision within 7 day(s). 4.  Patient will ambulate with supervision/set-up for 50 feet with the least restrictive device within 7 day(s). 5.  Patient will ascend/descend 4 stairs with handrail(s) with minimal assistance/contact guard assist within 7 day(s). 10/17/2022 1656 by Brenton Spence, PT  Outcome: Not Met  PHYSICAL THERAPY EVALUATION  Patient: Kassy Rivas (89 y.o. female)  Date: 10/17/2022  Primary Diagnosis: Pneumonia [J18.9]       Precautions: Fall (Droplet+); Language ([place])    ASSESSMENT  Based on the objective data described below, the patient presents with impaired functional mobility s/p admission w/ pneumonia, covid+. Currently her mobility is limited by impaired balance, generalized weakness, decreased insight into deficits and fall risk, and decreased activity tolerance. Patient is below her functional baseline of supervision ambulating in the home with a cane.   Today she required Min to Mod A for mobility with the cane and at risk for falls. Therapy will follow. Current Level of Function Impacting Discharge (mobility/balance): Up to Mod A bed mobility, sit<->stand and bed to chair. Functional Outcome Measure: The patient scored 45/100 on the Barthel outcome measure. Other factors to consider for discharge: Has 24/7 caregiver assistance in the home     Patient will benefit from skilled therapy intervention to address the above noted impairments. PLAN :  Recommendations and Planned Interventions: bed mobility training, transfer training, gait training, therapeutic exercises, neuromuscular re-education, patient and family training/education, and therapeutic activities      Frequency/Duration: Patient will be followed by physical therapy:  3 times a week to address goals. Recommendation for discharge: (in order for the patient to meet his/her long term goals)  Physical therapy at least 2 days/week in the home AND ensure assist and/or supervision for safety with mobility    This discharge recommendation:  Has not yet been discussed the attending provider and/or case management    IF patient discharges home will need the following DME: patient owns DME required for discharge         SUBJECTIVE:   Patient stated Thank you.     OBJECTIVE DATA SUMMARY:   HISTORY:    Past Medical History:   Diagnosis Date    Dementia     Endocrine disease     adrenal gland issues    Gastrointestinal disorder     C.  Diff    Hypertension     Malaria     UTI (lower urinary tract infection)      Past Surgical History:   Procedure Laterality Date    HX ORTHOPAEDIC      ORIF arm       Home Situation  Home Environment: Private residence  # Steps to Enter: 4  Rails to Enter: Yes  One/Two Story Residence: One story  Living Alone: No  Support Systems: Child(asha), Other Family Member(s)  Patient Expects to be Discharged to[de-identified] Home  Current DME Used/Available at Home: Cane, straight, Shower chair, Commode, bedside  Tub or Shower Type: Shower    EXAMINATION/PRESENTATION/DECISION MAKING:   Critical Behavior:  Neurologic State: Alert  Orientation Level: Appropriate for age  Cognition: Follows commands  Safety/Judgement: Awareness of environment, Decreased awareness of need for safety, Decreased awareness of need for assistance, Decreased insight into deficits  Hearing: Auditory  Auditory Impairment: None    Range Of Motion:  AROM: Generally decreased, functional                       Strength:    Strength: Generally decreased, functional              Coordination:  Coordination: Generally decreased, functional  Vision:   Tracking: Able to track stimulus in all quadrants w/o difficulty  Diplopia: No  Functional Mobility:  Bed Mobility:  Supine to Sit: Minimum assistance  Sit to Supine:  (in chair)  Scooting: Minimum assistance; Moderate assistance;Assist x1 (EOB/recliner)  Transfers:  Sit to Stand: Moderate assistance;Minimum assistance;Assist x1;Adaptive equipment (initial Mod A, progressed to Min A with SPC)  Stand to Sit: Minimum assistance;Assist x1;Adaptive equipment [facility])  VC/ instruction re: technique  (hand placement, benefit of relying on hosp socks vs house slippers d/t slippers sliding, hand placement). Bed to Chair: Moderate assistance;Assist x1;Adaptive equipment [facility])              Balance:   Sitting: Impaired  Sitting - Static: Good (unsupported)  Sitting - Dynamic: Fair (occasional)  Standing: Impaired; With support (SPC in R hand)  Standing - Static: Fair;Constant support  Standing - Dynamic : Fair;Poor;Constant support  Ambulation/Gait Training:                                       Therapeutic Activity:   Educated in fall risk with house slippers. Recommended hosp socks for transfers and ambulation. Assisted to/ from [facility]. Pt managed own hygiene.       Functional Measure:  Barthel Index:    Bathin  Bladder: 5  Bowels: 5  Groomin  Dressin  Feeding: 10  Mobility: 0  Stairs: 0  Toilet Use: 5  Transfer (Bed to Chair and Back): 10  Total: 45/100       The Barthel ADL Index: Guidelines  1. The index should be used as a record of what a patient does, not as a record of what a patient could do. 2. The main aim is to establish degree of independence from any help, physical or verbal, however minor and for whatever reason. 3. The need for supervision renders the patient not independent. 4. A patient's performance should be established using the best available evidence. Asking the patient, friends/relatives and nurses are the usual sources, but direct observation and common sense are also important. However direct testing is not needed. 5. Usually the patient's performance over the preceding 24-48 hours is important, but occasionally longer periods will be relevant. 6. Middle categories imply that the patient supplies over 50 per cent of the effort. 7. Use of aids to be independent is allowed. Score Interpretation (from [address] [facility] 83)    Independent   60-79 Minimally independent   40-59 Partially dependent   20-39 Very dependent   <20 Totally dependent     -Arleen Weber., Barthel, D.W. (1965). Functional evaluation: the Barthel Index. [address] [facility] ([address] [place] [address] (1997). The Barthel activities of daily living index: self-reporting versus actual performance in the old (> or = 75 years). Journal [address] 45(7), [address] [facility], ..Hunter, Landon Camacho., Diane Avina. (1999). Measuring the change in disability after inpatient rehabilitation; comparison of the responsiveness of the Barthel Index and Functional [place] Measure. Journal of Neurology, Neurosurgery, and Psychiatry, 66(4), 895-037. Dana Landaverde, N.J.A, LISSETTE Blankenship, & Maximiliano Lopez MHunterA. (2004) Assessment of post-stroke quality of life in cost-effectiveness studies: The usefulness of the Barthel Index and the EuroQoL-5D.  Quality of Life Research, 15, 284-66            Physical Therapy Evaluation Charge Determination   History Examination Presentation Decision-Making   MEDIUM  Complexity : 1-2 comorbidities / personal factors will impact the outcome/ POC  LOW Complexity : 1-2 Standardized tests and measures addressing body structure, function, activity limitation and / or participation in recreation  LOW Complexity : Stable, uncomplicated  LOW Complexity : FOTO score of       Based on the above components, the patient evaluation is determined to be of the following complexity level: LOW     Pain Rating:  None voiced    Activity Tolerance:   Fair and requires rest breaks    After treatment patient left in no apparent distress:   Sitting in chair, Call bell within reach, Caregiver / family present, and RN aware pt sitting in the chair without a chair alarm. Pt's son in law in the room is in agreement to notify RN when he leaves the room. COMMUNICATION/EDUCATION:   The patients plan of care was discussed with: Occupational therapist and Registered nurse. Fall prevention education was provided and the patient/caregiver indicated understanding., Patient/family have participated as able in goal setting and plan of care. , and Patient/family agree to work toward stated goals and plan of care.     Thank you for this referral.  Camilo Montana PT   Time Calculation: 23 mins

## 2022-10-18 PROCEDURE — 65270000046 HC RM TELEMETRY

## 2022-10-18 PROCEDURE — 74011000250 HC RX REV CODE- 250: Performed by: INTERNAL MEDICINE

## 2022-10-18 PROCEDURE — 89055 LEUKOCYTE ASSESSMENT FECAL: CPT

## 2022-10-18 PROCEDURE — G0378 HOSPITAL OBSERVATION PER HR: HCPCS

## 2022-10-18 PROCEDURE — 74011250637 HC RX REV CODE- 250/637: Performed by: NURSE PRACTITIONER

## 2022-10-18 PROCEDURE — 74011250637 HC RX REV CODE- 250/637: Performed by: INTERNAL MEDICINE

## 2022-10-18 PROCEDURE — 97116 GAIT TRAINING THERAPY: CPT

## 2022-10-18 PROCEDURE — 87324 CLOSTRIDIUM AG IA: CPT

## 2022-10-18 PROCEDURE — 97530 THERAPEUTIC ACTIVITIES: CPT

## 2022-10-18 RX ORDER — SODIUM CHLORIDE, SODIUM LACTATE, POTASSIUM CHLORIDE, CALCIUM CHLORIDE 600; 310; 30; 20 MG/100ML; MG/100ML; MG/100ML; MG/100ML
75 INJECTION, SOLUTION INTRAVENOUS CONTINUOUS
Status: DISCONTINUED | OUTPATIENT
Start: 2022-10-18 | End: 2022-10-19 | Stop reason: HOSPADM

## 2022-10-18 RX ADMIN — Medication 1000 UNITS: at 10:54

## 2022-10-18 RX ADMIN — DONEPEZIL HYDROCHLORIDE 5 MG: 5 TABLET, FILM COATED ORAL at 21:25

## 2022-10-18 RX ADMIN — SODIUM CHLORIDE, PRESERVATIVE FREE 10 ML: 5 INJECTION INTRAVENOUS at 05:14

## 2022-10-18 RX ADMIN — ACETAMINOPHEN 650 MG: 325 TABLET ORAL at 23:02

## 2022-10-18 RX ADMIN — SODIUM CHLORIDE, PRESERVATIVE FREE 10 ML: 5 INJECTION INTRAVENOUS at 21:26

## 2022-10-18 RX ADMIN — OXYCODONE HYDROCHLORIDE AND ACETAMINOPHEN 500 MG: 500 TABLET ORAL at 10:54

## 2022-10-18 RX ADMIN — PANTOPRAZOLE SODIUM 40 MG: 40 TABLET, DELAYED RELEASE ORAL at 07:51

## 2022-10-18 NOTE — PROGRESS NOTES
Problem: Mobility Impaired (Adult and Pediatric)  Goal: *Acute Goals and Plan of Care (Insert Text)  Description: FUNCTIONAL STATUS PRIOR TO ADMISSION: The patient ambulated with a single point cane inside the home, relies on caregiver assist ambulating stairs, outside the home, bathing, dressing, meals. HOME SUPPORT PRIOR TO ADMISSION: The patient lived with her daughter and son in law, has private duty caregivers during the day, daughter assists at night. Physical Therapy Goals  Initiated 10/17/2022  1. Patient will move from supine to sit and sit to supine  in bed with supervision within 7 day(s). 2.  Patient will transfer from bed to chair and chair to bed with supervision using the least restrictive device within 7 day(s). 3.  Patient will perform sit to stand with supervision within 7 day(s). 4.  Patient will ambulate with supervision/set-up for 50 feet with the least restrictive device within 7 day(s). 5.  Patient will ascend/descend 4 stairs with handrail(s) with minimal assistance/contact guard assist within 7 day(s). Outcome: Progressing Towards Goal   PHYSICAL THERAPY TREATMENT  Patient: Crissy Sandoval (38 y.o. female)  Date: 10/18/2022  Diagnosis: Pneumonia [J18.9] <principal problem not specified>      Precautions: Fall (Droplet+)  Chart, physical therapy assessment, plan of care and goals were reviewed. ASSESSMENT  Patient continues with skilled PT services and is progressing towards goals. Patient seen with daughter present. Assessed today with RW and did well with improved gait and less assist.  Per daughter has RW at home. Educated daughter on exercises to do with patient for UE ROM as well as LE's. Most appropriate to return home with family and home health.      Current Level of Function Impacting Discharge (mobility/balance): stand by assist to minimal assist    Other factors to consider for discharge: home with daughter          PLAN :  Patient continues to benefit from skilled intervention to address the above impairments. Continue treatment per established plan of care. to address goals. Recommendation for discharge: (in order for the patient to meet his/her long term goals)  Physical therapy at least 2 days/week in the home AND ensure assist and/or supervision for safety with mobility/ADLS    This discharge recommendation:  Has been made in collaboration with the attending provider and/or case management    IF patient discharges home will need the following DME: patient owns DME required for discharge ( daughter is asking about transport WC and advised to order on her own as not covered by insurance)       SUBJECTIVE:   Patient stated I 'm sleepy.  per daughter    OBJECTIVE DATA SUMMARY:   Critical Behavior:  Neurologic State: Alert, Confused  Orientation Level: Oriented to person, Disoriented to time, Disoriented to situation, Disoriented to place  Cognition: Follows commands, Impaired decision making, Decreased command following, Decreased attention/concentration  Safety/Judgement: Awareness of environment, Decreased awareness of need for safety, Decreased awareness of need for assistance, Decreased insight into deficits  Functional Mobility Training:  Bed Mobility:     Supine to Sit: Stand-by assistance  Sit to Supine: Stand-by assistance           Transfers:  Sit to Stand: Contact guard assistance  Stand to Sit: Contact guard assistance                             Balance:  Sitting: Impaired; Without support  Sitting - Static: Good (unsupported)  Sitting - Dynamic: Fair (occasional)  Standing: Impaired; With support  Standing - Static: Good  Standing - Dynamic : Good  Ambulation/Gait Training:  Distance (ft): 40 Feet (ft) (x2)  Assistive Device: Gait belt;Walker, rolling  Ambulation - Level of Assistance: Contact guard assistance;Minimal assistance        Gait Abnormalities: Decreased step clearance              Speed/Rianna: Pace decreased (<100 feet/min)  Step Length: Right shortened;Left shortened         Therapeutic Exercises:   BUE ROM  Pain Ratin    Activity Tolerance:   Fair    After treatment patient left in no apparent distress:   Supine in bed, Call bell within reach, Caregiver / family present, and Side rails x 3    COMMUNICATION/COLLABORATION:   The patients plan of care was discussed with: Registered nurse and Case management.      Lata Handy, PT   Time Calculation: 26 mins

## 2022-10-18 NOTE — PROGRESS NOTES
[facility]  Hospitalist Group                                                                                          Hospitalist Progress Note  Shiv Dow NP  Answering service: [phone] OR [phone] from in house phone        Date of Service:  10/18/2022  NAME:  Kathleen Cotton  :  3/15/1927  MRN:  [id number]      Admission Summary:   Per H&P, Kathleen Cotton is a 80 y.o. female with known past medical history of dementia patient alert oriented to name and place at baseline she able to participate in conversation, and answer appropriate, hypertension, recurrent UTI who was brought into emergency department for evaluation of cough, shortness of breath, generalized weakness since Friday, today the patient was not able to ambulate with front wheel walker due to generalized weakness, he will appetite diminished significantly, he will family including her daughter she decrease was positive for COVID 19. In the emergency department patient was evaluated vital signs were obtained, the patient was febrile with temperature 100.8, she experienced shortness of breath and cough nonproductive. CBC white blood cells were within normal limit, urinalysis was negative for urinary tract infection, chest x-ray was significant for possible pulmonary edema. Currently the patient does not require any supplemental oxygen. Creatinine elevated is 1.45 at baseline creatinine 1. Blood culture were requested, medicine was consulted for admission and further evaluation.      The patient denies any headache, blurry vision, sore throat, trouble swallowing, trouble with speech, chest pain, SOB, cough, fever, chills, N/V/D, abd pain, urinary symptoms, constipation, recent travels, sick contacts, focal or generalized neurological symptoms, falls, injuries, rashes, contact with COVID-19 diagnosed patients, hematemesis, melena, hemoptysis, hematuria, rashes, denies starting any new medications and denies any other concerns or problems besides as mentioned above. Interval history / Subjective:       I saw the patient this morning on rounds. Daughter was at the bedside at the moment of the encounter. Patient doing well, stable for discharge at that time  Subsequently at about noon patient went unresponsive, developed diarrhea and lethargy. Unresponsiveness resolved when placed back in bed however patient still declining to eat. I went back and saw the patient, she was in bed, interactive however still with malaise     Assessment & Plan:         COVID  Has tested positive for COVID-19. Patient not requiring oxygen, no need for dexamethasone at this point  CRP is mildly elevated we will follow-up on another CRP  Continuing empiric antibiotics for pneumonia  Previous procalcitonin was unremarkable, will check 1 more today and if this is within normal limits we will discontinue antibiotics    Lethargy  Likely element of dehydration. Will start IV hydration  Will check labs    diarrhea  Daughter is concerned for C. difficile which patient has had in the past  Patient has not had any antibiotic treatment however will check a C. difficile stool  We will also check WBC stool  Could very well be due to COVID  Will start IV hydration    Dementia  Patient with significant confusion. Family staying at the bedside  Continuing donepezil  Continuing quetiapine     Code status: DNR  Prophylaxis: LMWH  Care Plan discussed with: Patient, family  Anticipated Disposition: If remains stable, likely discharge tomorrow     Hospital Problems  Date Reviewed: 11/6/2019            Codes Class Noted POA    Pneumonia ICD-10-CM: J18.9  ICD-9-CM: 486  10/16/2022 Unknown           Review of Systems:   A comprehensive review of systems was negative except for that written in the HPI. Vital Signs:    Last 24hrs VS reviewed since prior progress note.  Most recent are:  Visit Vitals  /67   Pulse 62   Temp 97.4 °F (36.3 °C)   Resp 16   Ht 5' (1.524 m)   Wt 54.5 kg (120 lb 2.4 oz)   SpO2 95%   BMI 23.47 kg/m²         Intake/Output Summary (Last 24 hours) at 10/18/2022 1444  Last data filed at 10/17/2022 1800  Gross per 24 hour   Intake 240 ml   Output --   Net 240 ml          Physical Examination:     I had a face to face encounter with this patient and independently examined them on 10/18/2022 as outlined below:          Constitutional:  No acute distress, cooperative, pleasant    ENT:  Oral mucosa moist, oropharynx benign. Resp:  CTA bilaterally. No wheezing/rhonchi/rales. No accessory muscle use. CV:  Regular rhythm, normal rate, no murmurs, gallops, rubs    GI:  Soft, non distended, non tender. normoactive bowel sounds, no hepatosplenomegaly     Musculoskeletal:      No edema, warm, 2+ pulses throughout    Neurologic:  Moves all extremities. AAOx1            Data Review:    Review and/or order of clinical lab test  Review and/or order of tests in the radiology section of CPT      Labs:     Recent Labs     10/17/22  0328 10/16/22  1256   WBC 3.0* 5.8   HGB 10.0* 11.1*   HCT 30.7* 33.2*    184       Recent Labs     10/17/22  0328 10/16/22  1256    134*   K 3.8 4.1   * 103   CO2 24 24   BUN 13 15   CREA 1.19* 1.45*   GLU 85 116*   CA 8.5 9.2   MG 1.7  --        Recent Labs     10/17/22  0328 10/16/22  1256   ALT 13 14   AP 83 100   TBILI 0.3 0.6   TP 6.4 7.7   ALB 2.8* 3.4*   GLOB 3.6 4.3*       Recent Labs     10/17/22  0328   INR 1.1   PTP 11.1        No results for input(s): FE, TIBC, PSAT, FERR in the last 72 hours. Lab Results   Component Value Date/Time    Folate 75.0 (H) 02/22/2021 07:06 PM        No results for input(s): PH, PCO2, PO2 in the last 72 hours. No results for input(s): CPK, CKNDX, TROIQ in the last 72 hours.     No lab exists for component: CPKMB  No results found for: CHOL, CHOLX, CHLST, CHOLV, HDL, HDLP, LDL, LDLC, DLDLP, TGLX, TRIGL, TRIGP, CHHD, CHHDX  No results found for: GLUCPOC  Lab Results   Component Value Date/Time    Color YELLOW/STRAW 10/16/2022 03:14 PM    Appearance CLEAR 10/16/2022 03:14 PM    Specific gravity 1.014 10/16/2022 03:14 PM    pH (UA) 6.0 10/16/2022 03:14 PM    Protein Negative 10/16/2022 03:14 PM    Glucose Negative 10/16/2022 03:14 PM    Ketone Negative 10/16/2022 03:14 PM    Bilirubin Negative 10/16/2022 03:14 PM    Urobilinogen 0.2 10/16/2022 03:14 PM    Nitrites Negative 10/16/2022 03:14 PM    Leukocyte Esterase Negative 10/16/2022 03:14 PM    Epithelial cells FEW 10/16/2022 03:14 PM    Bacteria Negative 10/16/2022 03:14 PM    WBC 0-4 10/16/2022 03:14 PM    RBC 0-5 10/16/2022 03:14 PM         Medications Reviewed:     Current Facility-Administered Medications   Medication Dose Route Frequency    lactated Ringers infusion  75 mL/hr IntraVENous CONTINUOUS    ascorbic acid (vitamin C) (VITAMIN C) tablet 500 mg  500 mg Oral DAILY    donepeziL (ARICEPT) tablet 5 mg  5 mg Oral QHS    pantoprazole (PROTONIX) tablet 40 mg  40 mg Oral ACB    cholecalciferol (VITAMIN D3) (1000 Units /25 mcg) tablet 1,000 Units  1,000 Units Oral DAILY    enoxaparin (LOVENOX) injection 30 mg  30 mg SubCUTAneous QPM    QUEtiapine (SEROquel) tablet 100 mg  100 mg Oral DAILY PRN    sodium chloride (NS) flush 5-40 mL  5-40 mL IntraVENous Q8H    sodium chloride (NS) flush 5-40 mL  5-40 mL IntraVENous PRN    acetaminophen (TYLENOL) tablet 650 mg  650 mg Oral Q6H PRN    Or    acetaminophen (TYLENOL) suppository 650 mg  650 mg Rectal Q6H PRN    ondansetron (ZOFRAN ODT) tablet 4 mg  4 mg Oral Q8H PRN    Or    ondansetron (ZOFRAN) injection 4 mg  4 mg IntraVENous Q6H PRN    albuterol-ipratropium (DUO-NEB) 2.5 MG-0.5 MG/3 ML  3 mL Nebulization Q6H PRN     ______________________________________________________________________  EXPECTED LENGTH OF STAY: 5d 9h  ACTUAL LENGTH OF STAY:          2                 Caleb Patel NP
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
## 2022-10-18 NOTE — DISCHARGE INSTRUCTIONS
Discharge Instructions       PATIENT ID: Kassy Rivas  MRN: [id number]   YOB: 1927    DATE OF ADMISSION: 10/16/2022  DATE OF DISCHARGE: 10/19/2022    PRIMARY CARE PROVIDER: Santy Barajas MD    ATTENDING PHYSICIAN: Ayla Suazo MD  DISCHARGING PROVIDER: Rick Correa NP    To contact this individual call [phone] and ask the  to page. If unavailable ask to be transferred the Adult Hospitalist Department. DISCHARGE DIAGNOSES COVID    CONSULTATIONS: none    PROCEDURES/SURGERIES: * No surgery found *    PENDING TEST RESULTS:   At the time of discharge the following test results are still pending: na    FOLLOW UP APPOINTMENTS:   With PCP in 10 days    ADDITIONAL CARE RECOMMENDATIONS:     DIET: Regular Diet      ACTIVITY: Activity as tolerated    WOUND CARE: na    EQUIPMENT needed: na      DISCHARGE MEDICATIONS:   See Medication Reconciliation Form    It is important that you take the medication exactly as they are prescribed. Keep your medication in the bottles provided by the pharmacist and keep a list of the medication names, dosages, and times to be taken in your wallet. Do not take other medications without consulting your doctor. NOTIFY YOUR PHYSICIAN FOR ANY OF THE FOLLOWING:   Fever over 101 degrees for 24 hours. Chest pain, shortness of breath, severe cough,  fever, chills, nausea, vomiting, diarrhea, change in mentation, falling, weakness, bleeding. Severe pain or pain not relieved by medications. Or, any other signs or symptoms that you may have questions about.       DISPOSITION:   x Home With:   OT  PT  [place]  RN       SNF/Inpatient Rehab/LTAC    Independent/assisted living    Hospice    Other:           Signed:   Rick Correa NP  10/19/2022  7:47 AM

## 2022-10-18 NOTE — CONSULTS
Palliative Medicine    Note patient is being discharged today.   Will cancel palliative medicine consult  Please call as needed [phone]

## 2022-10-19 ENCOUNTER — HOME HEALTH ADMISSION (OUTPATIENT)
Dept: HOME HEALTH SERVICES | Facility: HOME HEALTH | Age: 87
End: 2022-10-19

## 2022-10-19 VITALS
WEIGHT: 120.15 LBS | SYSTOLIC BLOOD PRESSURE: 153 MMHG | HEART RATE: 70 BPM | RESPIRATION RATE: 17 BRPM | HEIGHT: 60 IN | TEMPERATURE: 98 F | DIASTOLIC BLOOD PRESSURE: 83 MMHG | BODY MASS INDEX: 23.59 KG/M2 | OXYGEN SATURATION: 96 %

## 2022-10-19 LAB
ANION GAP SERPL CALC-SCNC: 7 MMOL/L (ref 5–15)
BASOPHILS # BLD: 0 K/UL (ref 0–0.1)
BASOPHILS NFR BLD: 1 % (ref 0–1)
BUN SERPL-MCNC: 12 MG/DL (ref 6–20)
BUN/CREAT SERPL: 12 (ref 12–20)
C DIFF GDH STL QL: NEGATIVE
C DIFF TOX A+B STL QL IA: NEGATIVE
CALCIUM SERPL-MCNC: 8.3 MG/DL (ref 8.5–10.1)
CHLORIDE SERPL-SCNC: 107 MMOL/L (ref 97–108)
CO2 SERPL-SCNC: 23 MMOL/L (ref 21–32)
CREAT SERPL-MCNC: 0.97 MG/DL (ref 0.55–1.02)
CRP SERPL-MCNC: 0.93 MG/DL (ref 0–0.6)
DIFFERENTIAL METHOD BLD: ABNORMAL
EOSINOPHIL # BLD: 0 K/UL (ref 0–0.4)
EOSINOPHIL NFR BLD: 2 % (ref 0–7)
ERYTHROCYTE [DISTWIDTH] IN BLOOD BY AUTOMATED COUNT: 15.2 % (ref 11.5–14.5)
GLUCOSE SERPL-MCNC: 75 MG/DL (ref 65–100)
HCT VFR BLD AUTO: 30.5 % (ref 35–47)
HGB BLD-MCNC: 10.3 G/DL (ref 11.5–16)
IMM GRANULOCYTES # BLD AUTO: 0 K/UL (ref 0–0.04)
IMM GRANULOCYTES NFR BLD AUTO: 0 % (ref 0–0.5)
INTERPRETATION: NORMAL
LYMPHOCYTES # BLD: 0.9 K/UL (ref 0.8–3.5)
LYMPHOCYTES NFR BLD: 39 % (ref 12–49)
MCH RBC QN AUTO: 28.1 PG (ref 26–34)
MCHC RBC AUTO-ENTMCNC: 33.8 G/DL (ref 30–36.5)
MCV RBC AUTO: 83.3 FL (ref 80–99)
MONOCYTES # BLD: 0.3 K/UL (ref 0–1)
MONOCYTES NFR BLD: 14 % (ref 5–13)
NEUTS SEG # BLD: 1 K/UL (ref 1.8–8)
NEUTS SEG NFR BLD: 44 % (ref 32–75)
NRBC # BLD: 0 K/UL (ref 0–0.01)
NRBC BLD-RTO: 0 PER 100 WBC
PLATELET # BLD AUTO: 143 K/UL (ref 150–400)
PMV BLD AUTO: 10.8 FL (ref 8.9–12.9)
POTASSIUM SERPL-SCNC: 3.4 MMOL/L (ref 3.5–5.1)
RBC # BLD AUTO: 3.66 M/UL (ref 3.8–5.2)
SODIUM SERPL-SCNC: 137 MMOL/L (ref 136–145)
WBC # BLD AUTO: 2.4 K/UL (ref 3.6–11)
WBC #/AREA STL HPF: NORMAL /HPF (ref 0–4)

## 2022-10-19 PROCEDURE — 74011250637 HC RX REV CODE- 250/637: Performed by: NURSE PRACTITIONER

## 2022-10-19 PROCEDURE — 80048 BASIC METABOLIC PNL TOTAL CA: CPT

## 2022-10-19 PROCEDURE — 94760 N-INVAS EAR/PLS OXIMETRY 1: CPT

## 2022-10-19 PROCEDURE — G0378 HOSPITAL OBSERVATION PER HR: HCPCS

## 2022-10-19 PROCEDURE — 74011250636 HC RX REV CODE- 250/636: Performed by: NURSE PRACTITIONER

## 2022-10-19 PROCEDURE — 36415 COLL VENOUS BLD VENIPUNCTURE: CPT

## 2022-10-19 PROCEDURE — 86140 C-REACTIVE PROTEIN: CPT

## 2022-10-19 PROCEDURE — 85025 COMPLETE CBC W/AUTO DIFF WBC: CPT

## 2022-10-19 PROCEDURE — 74011000250 HC RX REV CODE- 250: Performed by: INTERNAL MEDICINE

## 2022-10-19 RX ORDER — POTASSIUM CHLORIDE 750 MG/1
40 TABLET, FILM COATED, EXTENDED RELEASE ORAL
Status: COMPLETED | OUTPATIENT
Start: 2022-10-19 | End: 2022-10-19

## 2022-10-19 RX ADMIN — Medication 1 CAPSULE: at 12:20

## 2022-10-19 RX ADMIN — OXYCODONE HYDROCHLORIDE AND ACETAMINOPHEN 500 MG: 500 TABLET ORAL at 08:56

## 2022-10-19 RX ADMIN — SODIUM CHLORIDE, POTASSIUM CHLORIDE, SODIUM LACTATE AND CALCIUM CHLORIDE 75 ML/HR: 600; 310; 30; 20 INJECTION, SOLUTION INTRAVENOUS at 06:34

## 2022-10-19 RX ADMIN — Medication 1000 UNITS: at 08:56

## 2022-10-19 RX ADMIN — SODIUM CHLORIDE, PRESERVATIVE FREE 10 ML: 5 INJECTION INTRAVENOUS at 06:50

## 2022-10-19 RX ADMIN — PANTOPRAZOLE SODIUM 40 MG: 40 TABLET, DELAYED RELEASE ORAL at 06:34

## 2022-10-19 RX ADMIN — POTASSIUM CHLORIDE 40 MEQ: 750 TABLET, FILM COATED, EXTENDED RELEASE ORAL at 12:20
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
## 2022-10-19 NOTE — PROGRESS NOTES
[address] [facility] Adult  Hospitalist Group                                                                                          Hospitalist Progress Note  Ryan Cuadra NP  Answering service: [phone] OR [phone] from in house phone        Date of Service:  10/19/2022  NAME:  Adriana Cortez  :  3/15/1927  MRN:  [id number]      Admission Summary:   Per H&P, Adriana Cortez is a 80 y.o. female with known past medical history of dementia patient alert oriented to name and place at baseline she able to participate in conversation, and answer appropriate, hypertension, recurrent UTI who was brought into emergency department for evaluation of cough, shortness of breath, generalized weakness since Friday, today the patient was not able to ambulate with front wheel walker due to generalized weakness, he will appetite diminished significantly, he will family including her daughter she decrease was positive for COVID 19. In the emergency department patient was evaluated vital signs were obtained, the patient was febrile with temperature 100.8, she experienced shortness of breath and cough nonproductive. CBC white blood cells were within normal limit, urinalysis was negative for urinary tract infection, chest x-ray was significant for possible pulmonary edema. Currently the patient does not require any supplemental oxygen. Creatinine elevated is 1.45 at baseline creatinine 1. Blood culture were requested, medicine was consulted for admission and further evaluation.      The patient denies any headache, blurry vision, sore throat, trouble swallowing, trouble with speech, chest pain, SOB, cough, fever, chills, N/V/D, abd pain, urinary symptoms, constipation, recent travels, sick contacts, focal or generalized neurological symptoms, falls, injuries, rashes, contact with COVID-19 diagnosed patients, hematemesis, melena, hemoptysis, hematuria, rashes, denies starting any new medications and denies any other concerns or problems besides as mentioned above. Interval history / Subjective:       I saw the patient on AM erik. Daisy this morning. Daughter at the bedside, updated at that time     Assessment & Plan:         COVID  Has tested positive for COVID-19. Patient not requiring oxygen, no need for dexamethasone at this point  CRP is mildly elevated 1.84-  follow-up CRP today almost normal at 0.93  Previous procalcitonin x2 was unremarkable,  Antibiotics have been discontinued    Lethargy  Likely element of dehydration. started IV hydration yesterday  Labs ordered yesterday but not done  Labs this morning normal- hold further IV fluids    diarrhea  Daughter is concerned for C. difficile which patient has had in the past  Patient has had antibiotic treatment  will check a C. difficile stool. Meets criteria, with exception of leukocytosis, however patient leukopenic. Call placed to infection control, no call back  WBC stool pending  Could very well be due to COVID      Dementia  Patient with significant confusion. Family staying at the bedside  Continuing donepezil  Continuing quetiapine     Code status: DNR  Prophylaxis: LMWH  Care Plan discussed with: Patient, family  Anticipated Disposition: If WBC stool negative, will discharge today. Otherwise likely tomorrow     Hospital Problems  Date Reviewed: 11/6/2019            Codes Class Noted POA    Pneumonia ICD-10-CM: J18.9  ICD-9-CM: 932  10/16/2022 Unknown         Review of Systems:   A comprehensive review of systems was negative except for that written in the HPI. Vital Signs:    Last 24hrs VS reviewed since prior progress note.  Most recent are:  Visit Vitals  BP (!) 140/70 (BP 1 Location: Left upper arm, BP Patient Position: At rest)   Pulse (!) 54   Temp 97.6 °F (36.4 °C)   Resp 17   Ht 5' (1.524 m)   Wt 54.5 kg (120 lb 2.4 oz)   SpO2 95%   BMI 23.47 kg/m²         Intake/Output Summary (Last 24 hours) at 10/19/2022 7286  Last data filed at 10/18/2022 2131  Gross per 24 hour   Intake 120 ml   Output --   Net 120 ml          Physical Examination:     I had a face to face encounter with this patient and independently examined them on 10/19/2022 as outlined below:          Constitutional:  No acute distress, cooperative, pleasant    ENT:  Oral mucosa moist, oropharynx benign. Resp:  CTA bilaterally. No wheezing/rhonchi/rales. No accessory muscle use. CV:  Regular rhythm, normal rate, no murmurs, gallops, rubs    GI:  Soft, non distended, non tender. normoactive bowel sounds, no hepatosplenomegaly     Musculoskeletal:      No edema, warm, 2+ pulses throughout    Neurologic:  Moves all extremities. AAOx1            Data Review:    Review and/or order of clinical lab test  Review and/or order of tests in the radiology section of [facility]      Labs:     Recent Labs     10/17/22  0328 10/16/22  1256   WBC 3.0* 5.8   HGB 10.0* 11.1*   HCT 30.7* 33.2*    184       Recent Labs     10/17/22  0328 10/16/22  1256    134*   K 3.8 4.1   * 103   CO2 24 24   BUN 13 15   CREA 1.19* 1.45*   GLU 85 116*   CA 8.5 9.2   MG 1.7  --        Recent Labs     10/17/22  0328 10/16/22  1256   ALT 13 14   AP 83 100   TBILI 0.3 0.6   TP 6.4 7.7   ALB 2.8* 3.4*   GLOB 3.6 4.3*       Recent Labs     10/17/22  0328   INR 1.1   PTP 11.1        No results for input(s): FE, TIBC, PSAT, FERR in the last 72 hours. Lab Results   Component Value Date/Time    Folate 75.0 (H) 02/22/2021 07:06 PM        No results for input(s): PH, PCO2, PO2 in the last 72 hours. No results for input(s): CPK, CKNDX, TROIQ in the last 72 hours.     No lab exists for component: CPKMB  No results found for: CHOL, CHOLX, CHLST, CHOLV, HDL, HDLP, LDL, LDLC, DLDLP, TGLX, TRIGL, TRIGP, CHHD, CHHDX  No results found for: [facility]  Lab Results   Component Value Date/Time    Color YELLOW/STRAW 10/16/2022 03:14 PM    Appearance CLEAR 10/16/2022 03:14 PM    Specific gravity 1.014 10/16/2022 03:14 PM pH (UA) 6.0 10/16/2022 03:14 PM    Protein Negative 10/16/2022 03:14 PM    Glucose Negative 10/16/2022 03:14 PM    Ketone Negative 10/16/2022 03:14 PM    Bilirubin Negative 10/16/2022 03:14 PM    Urobilinogen 0.2 10/16/2022 03:14 PM    Nitrites Negative 10/16/2022 03:14 PM    Leukocyte Esterase Negative 10/16/2022 03:14 PM    Epithelial cells FEW 10/16/2022 03:14 PM    Bacteria Negative 10/16/2022 03:14 PM    WBC 0-4 10/16/2022 03:14 PM    RBC 0-5 10/16/2022 03:14 PM         Medications Reviewed:     Current Facility-Administered Medications   Medication Dose Route Frequency    lactated Ringers infusion  75 mL/hr IntraVENous CONTINUOUS    ascorbic acid (vitamin C) (VITAMIN C) tablet 500 mg  500 mg Oral DAILY    donepeziL (ARICEPT) tablet 5 mg  5 mg Oral QHS    pantoprazole (PROTONIX) tablet 40 mg  40 mg Oral ACB    cholecalciferol (VITAMIN D3) (1000 Units /25 mcg) tablet 1,000 Units  1,000 Units Oral DAILY    enoxaparin (LOVENOX) injection 30 mg  30 mg SubCUTAneous QPM    QUEtiapine (SEROquel) tablet 100 mg  100 mg Oral DAILY PRN    sodium chloride (NS) flush 5-40 mL  5-40 mL IntraVENous Q8H    sodium chloride (NS) flush 5-40 mL  5-40 mL IntraVENous PRN    acetaminophen (TYLENOL) tablet 650 mg  650 mg Oral Q6H PRN    Or    acetaminophen (TYLENOL) suppository 650 mg  650 mg Rectal Q6H PRN    ondansetron (ZOFRAN ODT) tablet 4 mg  4 mg Oral Q8H PRN    Or    ondansetron (ZOFRAN) injection 4 mg  4 mg IntraVENous Q6H PRN    albuterol-ipratropium (DUO-NEB) 2.5 MG-0.5 MG/3 ML  3 mL Nebulization Q6H PRN     ______________________________________________________________________  EXPECTED LENGTH OF STAY: 5d 9h  ACTUAL LENGTH OF STAY:          3                 Caleb Patel NP

## 2022-10-19 NOTE — PROGRESS NOTES
BRITTNEY:    RUR 9%    Disposition: Home with daughter, [facility], and [facility]. AVS updated.     Transportation: Daughter    Follow up: PCP/Specialist    Primary contact: Selam Faith [phone]    Elizabet Darnell RN/CRM  [phone]

## 2022-10-20 ENCOUNTER — PATIENT OUTREACH (OUTPATIENT)
Dept: CASE MANAGEMENT | Age: 87
End: 2022-10-20

## 2022-10-21 ENCOUNTER — HOME CARE VISIT (OUTPATIENT)
Dept: HOME HEALTH SERVICES | Facility: HOME HEALTH | Age: 87
End: 2022-10-21

## 2022-10-21 NOTE — PROGRESS NOTES
Care Transitions Outreach Attempt    Call within 2 business days of discharge: Yes   Attempted to reach patient for transitions of care follow up. Unable to reach patient. Patient: Coni Harley Patient : 3/15/1927 MRN: [id number]    Last Discharge  Street       Date Complaint Diagnosis Description Type Department Provider    10/16/22 Fatigue; Concern For COVID-19 (Coronavirus) COVID-19 virus infection . .. ED to Hosp-Admission (Discharged) (ADMIT) Ronit Gustafson MD; Clarisa Castillo. .. Was this an external facility discharge? No     Noted following upcoming appointments from discharge chart review:   [address] follow up appointment(s): No future appointments.

## 2022-10-22 LAB
BACTERIA SPEC CULT: NORMAL
SERVICE CMNT-IMP: NORMAL

## 2022-10-26 ENCOUNTER — HOSPITAL ENCOUNTER (INPATIENT)
Age: 87
LOS: 6 days | Discharge: HOME HEALTH | DRG: 641 | End: 2022-11-01
Attending: EMERGENCY MEDICINE | Admitting: STUDENT IN AN ORGANIZED HEALTH CARE EDUCATION/TRAINING PROGRAM
Payer: MEDICARE

## 2022-10-26 ENCOUNTER — APPOINTMENT (OUTPATIENT)
Dept: GENERAL RADIOLOGY | Age: 87
DRG: 641 | End: 2022-10-26
Attending: EMERGENCY MEDICINE
Payer: MEDICARE

## 2022-10-26 ENCOUNTER — APPOINTMENT (OUTPATIENT)
Dept: CT IMAGING | Age: 87
DRG: 641 | End: 2022-10-26
Attending: STUDENT IN AN ORGANIZED HEALTH CARE EDUCATION/TRAINING PROGRAM
Payer: MEDICARE

## 2022-10-26 ENCOUNTER — HOME CARE VISIT (OUTPATIENT)
Dept: HOME HEALTH SERVICES | Facility: HOME HEALTH | Age: 87
End: 2022-10-26

## 2022-10-26 DIAGNOSIS — R52 DIFFUSE PAIN: ICD-10-CM

## 2022-10-26 DIAGNOSIS — I50.9 ACUTE CONGESTIVE HEART FAILURE, UNSPECIFIED HEART FAILURE TYPE (HCC): Primary | ICD-10-CM

## 2022-10-26 LAB
ALBUMIN SERPL-MCNC: 2.8 G/DL (ref 3.5–5)
ALBUMIN/GLOB SERPL: 0.6 {RATIO} (ref 1.1–2.2)
ALP SERPL-CCNC: 78 U/L (ref 45–117)
ALT SERPL-CCNC: 16 U/L (ref 12–78)
ANION GAP SERPL CALC-SCNC: 9 MMOL/L (ref 5–15)
APPEARANCE UR: CLEAR
AST SERPL-CCNC: 34 U/L (ref 15–37)
BACTERIA URNS QL MICRO: NEGATIVE /HPF
BASOPHILS # BLD: 0 K/UL (ref 0–0.1)
BASOPHILS NFR BLD: 0 % (ref 0–1)
BILIRUB SERPL-MCNC: 1.2 MG/DL (ref 0.2–1)
BILIRUB UR QL: NEGATIVE
BNP SERPL-MCNC: 1440 PG/ML
BUN SERPL-MCNC: 15 MG/DL (ref 6–20)
BUN/CREAT SERPL: 12 (ref 12–20)
CALCIUM SERPL-MCNC: 9.2 MG/DL (ref 8.5–10.1)
CHLORIDE SERPL-SCNC: 104 MMOL/L (ref 97–108)
CO2 SERPL-SCNC: 23 MMOL/L (ref 21–32)
COLOR UR: ABNORMAL
COMMENT, HOLDF: NORMAL
CREAT SERPL-MCNC: 1.22 MG/DL (ref 0.55–1.02)
DIFFERENTIAL METHOD BLD: ABNORMAL
EOSINOPHIL # BLD: 0 K/UL (ref 0–0.4)
EOSINOPHIL NFR BLD: 0 % (ref 0–7)
EPITH CASTS URNS QL MICRO: ABNORMAL /LPF
ERYTHROCYTE [DISTWIDTH] IN BLOOD BY AUTOMATED COUNT: 14.8 % (ref 11.5–14.5)
GLOBULIN SER CALC-MCNC: 5 G/DL (ref 2–4)
GLUCOSE SERPL-MCNC: 100 MG/DL (ref 65–100)
GLUCOSE UR STRIP.AUTO-MCNC: NEGATIVE MG/DL
HCT VFR BLD AUTO: 34.2 % (ref 35–47)
HGB BLD-MCNC: 11.4 G/DL (ref 11.5–16)
HGB UR QL STRIP: NEGATIVE
IMM GRANULOCYTES # BLD AUTO: 0.1 K/UL (ref 0–0.04)
IMM GRANULOCYTES NFR BLD AUTO: 1 % (ref 0–0.5)
KETONES UR QL STRIP.AUTO: 15 MG/DL
LACTATE SERPL-SCNC: 2.8 MMOL/L (ref 0.4–2)
LEUKOCYTE ESTERASE UR QL STRIP.AUTO: ABNORMAL
LYMPHOCYTES # BLD: 1 K/UL (ref 0.8–3.5)
LYMPHOCYTES NFR BLD: 14 % (ref 12–49)
MAGNESIUM SERPL-MCNC: 1.5 MG/DL (ref 1.6–2.4)
MCH RBC QN AUTO: 27.7 PG (ref 26–34)
MCHC RBC AUTO-ENTMCNC: 33.3 G/DL (ref 30–36.5)
MCV RBC AUTO: 83 FL (ref 80–99)
MONOCYTES # BLD: 1.2 K/UL (ref 0–1)
MONOCYTES NFR BLD: 16 % (ref 5–13)
MUCOUS THREADS URNS QL MICRO: ABNORMAL /LPF
NEUTS SEG # BLD: 5.2 K/UL (ref 1.8–8)
NEUTS SEG NFR BLD: 69 % (ref 32–75)
NITRITE UR QL STRIP.AUTO: NEGATIVE
NRBC # BLD: 0 K/UL (ref 0–0.01)
NRBC BLD-RTO: 0 PER 100 WBC
PH UR STRIP: 5.5 [PH] (ref 5–8)
PLATELET # BLD AUTO: 210 K/UL (ref 150–400)
PMV BLD AUTO: 12.1 FL (ref 8.9–12.9)
POTASSIUM SERPL-SCNC: 4.2 MMOL/L (ref 3.5–5.1)
PROCALCITONIN SERPL-MCNC: 0.13 NG/ML
PROT SERPL-MCNC: 7.8 G/DL (ref 6.4–8.2)
PROT UR STRIP-MCNC: 100 MG/DL
RBC # BLD AUTO: 4.12 M/UL (ref 3.8–5.2)
RBC #/AREA URNS HPF: ABNORMAL /HPF (ref 0–5)
SAMPLES BEING HELD,HOLD: NORMAL
SODIUM SERPL-SCNC: 136 MMOL/L (ref 136–145)
SP GR UR REFRACTOMETRY: 1.02 (ref 1–1.03)
TROPONIN-HIGH SENSITIVITY: 32 NG/L (ref 0–51)
UA: UC IF INDICATED,UAUC: ABNORMAL
UROBILINOGEN UR QL STRIP.AUTO: 0.2 EU/DL (ref 0.2–1)
WBC # BLD AUTO: 7.5 K/UL (ref 3.6–11)
WBC URNS QL MICRO: ABNORMAL /HPF (ref 0–4)

## 2022-10-26 PROCEDURE — 93005 ELECTROCARDIOGRAM TRACING: CPT

## 2022-10-26 PROCEDURE — 81001 URINALYSIS AUTO W/SCOPE: CPT

## 2022-10-26 PROCEDURE — 84484 ASSAY OF TROPONIN QUANT: CPT

## 2022-10-26 PROCEDURE — 84145 PROCALCITONIN (PCT): CPT

## 2022-10-26 PROCEDURE — 87040 BLOOD CULTURE FOR BACTERIA: CPT

## 2022-10-26 PROCEDURE — 71045 X-RAY EXAM CHEST 1 VIEW: CPT

## 2022-10-26 PROCEDURE — 85025 COMPLETE CBC W/AUTO DIFF WBC: CPT

## 2022-10-26 PROCEDURE — 71250 CT THORAX DX C-: CPT

## 2022-10-26 PROCEDURE — 83605 ASSAY OF LACTIC ACID: CPT

## 2022-10-26 PROCEDURE — 83735 ASSAY OF MAGNESIUM: CPT

## 2022-10-26 PROCEDURE — 99285 EMERGENCY DEPT VISIT HI MDM: CPT

## 2022-10-26 PROCEDURE — 87086 URINE CULTURE/COLONY COUNT: CPT

## 2022-10-26 PROCEDURE — 65270000046 HC RM TELEMETRY

## 2022-10-26 PROCEDURE — 83880 ASSAY OF NATRIURETIC PEPTIDE: CPT

## 2022-10-26 PROCEDURE — 80053 COMPREHEN METABOLIC PANEL: CPT

## 2022-10-26 PROCEDURE — 36415 COLL VENOUS BLD VENIPUNCTURE: CPT

## 2022-10-26 RX ORDER — LANOLIN ALCOHOL/MO/W.PET/CERES
1000 CREAM (GRAM) TOPICAL DAILY
COMMUNITY

## 2022-10-26 RX ORDER — FAMOTIDINE 40 MG/1
40 TABLET, FILM COATED ORAL 2 TIMES DAILY
COMMUNITY

## 2022-10-26 RX ORDER — SODIUM CHLORIDE 0.9 % (FLUSH) 0.9 %
5-10 SYRINGE (ML) INJECTION AS NEEDED
Status: DISCONTINUED | OUTPATIENT
Start: 2022-10-26 | End: 2022-11-01 | Stop reason: HOSPADM

## 2022-10-26 NOTE — PROGRESS NOTES
Admission Medication Reconciliation:    Information obtained from:  Patient's daugther  RxQuery data available¹:  YES    Comments/Recommendations: Updated PTA meds/reviewed patient's allergies. 1)  Patient's daugther had a medication list for her mother (patient). This was used to complete the MR and update the PTA med list    2)  Medication changes (since last review): Added  - Pepcid 40mg BID  - B12 1000mcg daily   - Calcium 1000mg daily     Adjusted  -  mirtazapine --> 30mg HS  - VitC --> 1000mg daily     Removed  - Protonix  - VitD3  - Cranberry  - Ranitidine  - Seroquel  - Probiotic (duplicate)     ¹RxQuery pharmacy benefit data reflects medications filled and processed through the patient's insurance, however   this data does NOT capture whether the medication was picked up or is currently being taken by the patient. Allergies:  Aspirin, Sulfa (sulfonamide antibiotics), Lidocaine, Novocain [procaine], and Penicillins    Significant PMH/Disease States:   Past Medical History:   Diagnosis Date    Dementia     Endocrine disease     adrenal gland issues    Gastrointestinal disorder     C. Diff    Hypertension     Malaria     UTI (lower urinary tract infection)      Chief Complaint for this Admission:    Chief Complaint   Patient presents with    Positive For Covid-19     Prior to Admission Medications:   Prior to Admission Medications   Prescriptions Last Dose Informant Taking? L.acid,para-B. bifidum-S.therm (RISAQUAD) 8 billion cell cap cap  Child No   Sig: Take 1 Capsule by mouth daily for 30 days. ascorbic acid, vitamin C, (VITAMIN C) 1,000 mg tablet  Child No   Sig: Take 1,000 mg by mouth daily. b complex vitamins tablet  Child No   Sig: Take 1 Tab by mouth daily. calcium carbonate 500 mg calcium (1,250 mg) capsule  Child Yes   Sig: Take 2 Tablets by mouth daily. cyanocobalamin 1,000 mcg tablet  Child Yes   Sig: Take 1,000 mcg by mouth daily.    donepezil (ARICEPT) 5 mg tablet  Child No   Sig: Take 5 mg by mouth nightly. famotidine (PEPCID) 40 mg tablet  Child Yes   Sig: Take 40 mg by mouth two (2) times a day. mirtazapine (REMERON) 30 mg tablet  Child No   Sig: Take 30 mg by mouth nightly. ondansetron (ZOFRAN ODT) 4 mg disintegrating tablet  Child No   Sig: Take 1 Tab by mouth every eight (8) hours as needed for Nausea. Facility-Administered Medications: None     Please contact the main inpatient pharmacy with any questions or concerns at [phone] and we will direct you to the clinical pharmacist covering this patient's care while in-house.    Leanne Kaur, PHARMD

## 2022-10-26 NOTE — ED TRIAGE NOTES
TRIAGE NOTE:   Patient arrives by EMS from home for c/o Covid pneumonia Patient discharged from hospital on 10/19. Per EMS report home health reports temperature of 100 and patient \"hypotensive\" at  and patient more confused than baseline. Patient has history of dementia and only speaks Brazilian. Family reports they are concerned patient has pneumonia.

## 2022-10-26 NOTE — ED PROVIDER NOTES
49-year-old female with a history of dementia, hypertension presents with a chief complaint of concern for pneumonia. Patient's daughter states that she was diagnosed with COVID several weeks ago. She was seen by [facility] yesterday and diagnosed with pneumonia. She was started on antibiotics. This morning she was difficult to arouse. [facility] came back out and told the family that the patient had a fever of 106. Patient's daughter reports that she has much more alert and oriented now that she is in the emergency department. Past Medical History:   Diagnosis Date    Dementia     Endocrine disease     adrenal gland issues    Gastrointestinal disorder     C. Diff    Hypertension     Malaria     UTI (lower urinary tract infection)        Past Surgical History:   Procedure Laterality Date    HX ORTHOPAEDIC      ORIF arm         No family history on file. Social History     Socioeconomic History    Marital status: UNKNOWN     Spouse name: Not on file    Number of children: Not on file    Years of education: Not on file    Highest education level: Not on file   Occupational History    Not on file   Tobacco Use    Smoking status: Never    Smokeless tobacco: Never   Substance and Sexual Activity    Alcohol use: No    Drug use: No    Sexual activity: Never   Other Topics Concern    Not on file   Social History Narrative    Not on file     Social Determinants of Health     Financial Resource Strain: Not on file   Food Insecurity: Not on file   Transportation Needs: Not on file   Physical Activity: Not on file   Stress: Not on file   Social Connections: Not on file   Intimate Partner Violence: Not on file   Housing Stability: Not on file         ALLERGIES: Aspirin, Sulfa (sulfonamide antibiotics), Lidocaine, Novocain [procaine], and Penicillins    Review of Systems   Unable to perform ROS: Dementia     There were no vitals filed for this visit.          Physical Exam  Vitals and nursing note [place] Cardiomuyopathy Questionare (CQ-12)    The following questions refer to your heart failure and how it may affect your life.  Please read and complete the following questions.  There are no right or wrong answers.  Please lexa the answers that best applies to you.    1. Heart failure affects different people in different ways.  Some feel shortness of breath while others feel fatigued.  Please indicate how much you are limited by heart failure (shortness of breath or fatigue) in your ability to do the following activities over the past 2 weeks.    Activity Extremely limited Quite a bit  Limited Moderately limited Slightly limited Not at all limited Limited for other reasons or did not do the activity   Showering/bathing     X    Walking 1 block on a ground level     X    Hurrying or jogging (as to catch a bus)   X       1 2 3 4 5 6     2. Over the past 2 weeks how many times to did you have swelling in your feet, ankles or legs when you woke up in the morning?    Every morning 3 or more times per week but not every day 1-2 times a week Less than once a week Never over the past 2 weeks       X   1 2 3 4 5     3. Over the past 2 weeks on average, how many times has fatigue limited your ability to do what you wanted?    All of the time Several times per day At least once a day 3 or more times per week but not every day 1-2 times per week Less than once a week Never over the past 2 weeks         X   1 2 3 4 5 6 7     4. Over the past 2 weeks, on average, how many times has shortness of breath limited your ability to do what you wanted?    All of the time Several times per day At least once a day 3 or more times per week but not every day 1-2 times per week Less than once a week Never over the past 2 weeks         X   1 2 3 4 5 6 7     5. Over the past 2 weeks, on average, how many times have you been forced to sleep sitting up in a chair or with at least 3 pillows to prop you up because of shortness of  breath?    Every night 3 or more times per week but not every day 1-2 times per week Less than once a week Never over the past 2 weeks       X   1 2 3 4 5     Rev. 2012-04-11      6. Over the past 2 weeks, how much has your heart failure limited your enjoyment of life?    It has extremely limited my enjoyment of life It has limited my enjoyment of life   quite a bit It has moderately limited my enjoyment of life It has slightly limited my enjoyment of life It has not limited my enjoyment of life       X   1 2 3 4 5     7. If you had to spend the rest your life with your heart failure the way it is right now, how would you feel about this?    Not at all satisfied Mostly dissatisfied Somewhat satisfied Mostly satisfied  Completely satisfied       X   1 2 3 4 5     8. How much does your heart failure affect your lifestyle?  Please indicate how your heart failure may have limited your participation in the following activities over the past 2 weeks?    Activity Severely limited Limited quite a bit Moderately limited Slightly limited Did not limit at all Does not apply or did not do for other reasons   Hobbies, recreational activities     X    Working or doing household chores     X    Visiting family or friends out of your home      X    1 2 3 4 5 6     Rev 2012-04-16   reviewed. Constitutional:       General: She is not in acute distress. Appearance: Normal appearance. She is not ill-appearing, toxic-appearing or diaphoretic. HENT:      Head: Normocephalic and atraumatic. Eyes:      Extraocular Movements: Extraocular movements intact. Cardiovascular:      Rate and Rhythm: Normal rate. Pulses: Normal pulses. Pulmonary:      Effort: Pulmonary effort is normal. No respiratory distress. Abdominal:      General: There is no distension. Musculoskeletal:         General: Normal range of motion. Cervical back: Normal range of motion. Skin:     General: Skin is dry. Neurological:      Mental Status: She is alert. Mental status is at baseline. Psychiatric:         Mood and Affect: Mood normal.        MDM  Number of Diagnoses or Management Options  Acute congestive heart failure, unspecified heart failure type [facility])  Diagnosis management comments:     Patient presents with concern for pneumonia. Other differentials include but are not limited to COVID, CHF. Chest x-ray shows interstitial edema. BNP is elevated. Will admit for cardiology evaluation. Perfect Serve Consult for Admission  4:18 PM    ED Room Number: ER30/30  Patient Name and age:  Enrique Ramos 80 y.o.  female  Working Diagnosis: Acute congestive heart failure, unspecified heart failure type ([address]  (primary encounter diagnosis)    COVID-19 Suspicion:  no  Sepsis present:  no  Reassessment needed: no  Code Status:  Full Code  Readmission: yes  Isolation Requirements:  no  Recommended Level of Care:  telemetry  Department:[facility] Adult ED - 21   Other: recent admission with covid          ED Course as of 10/26/22 1618   Wed Oct 26, 2022   1617 EKG shows sinus rhythm at a rate of 99, normal intervals, normal axis, no ischemic changes.  [RD]      ED Course User Index  [RD] Lisa Adames MD       Procedures

## 2022-10-26 NOTE — Clinical Note
Status[de-identified] INPATIENT [101]   Type of Bed: Telemetry [19]   Cardiac Monitoring Required?: Yes   Inpatient Hospitalization Certified Necessary for the Following Reasons: 9.  Other (further clarification in H&P documentation)   Admitting Diagnosis: CHF (congestive heart failure) [facility]) [[id number]]   Admitting Physician: Timothy Maciel   Attending Physician: Timothy Maciel   Estimated Length of Stay: 3-4 Midnights   Discharge Plan[de-identified] Home with Office Follow-up

## 2022-10-27 ENCOUNTER — APPOINTMENT (OUTPATIENT)
Dept: NON INVASIVE DIAGNOSTICS | Age: 87
DRG: 641 | End: 2022-10-27
Attending: STUDENT IN AN ORGANIZED HEALTH CARE EDUCATION/TRAINING PROGRAM
Payer: MEDICARE

## 2022-10-27 LAB
ANION GAP SERPL CALC-SCNC: 9 MMOL/L (ref 5–15)
ATRIAL RATE: 99 BPM
BACTERIA SPEC CULT: NORMAL
BUN SERPL-MCNC: 20 MG/DL (ref 6–20)
BUN/CREAT SERPL: 11 (ref 12–20)
CALCIUM SERPL-MCNC: 9.1 MG/DL (ref 8.5–10.1)
CALCULATED P AXIS, ECG09: 35 DEGREES
CALCULATED R AXIS, ECG10: -18 DEGREES
CALCULATED T AXIS, ECG11: -4 DEGREES
CHLORIDE SERPL-SCNC: 102 MMOL/L (ref 97–108)
CO2 SERPL-SCNC: 22 MMOL/L (ref 21–32)
CREAT SERPL-MCNC: 1.83 MG/DL (ref 0.55–1.02)
DIAGNOSIS, 93000: NORMAL
ECHO AV AREA PEAK VELOCITY: 2.2 CM2
ECHO AV AREA/BSA PEAK VELOCITY: 1.5 CM2/M2
ECHO AV PEAK GRADIENT: 6 MMHG
ECHO AV PEAK VELOCITY: 1.2 M/S
ECHO AV VELOCITY RATIO: 0.83
ECHO LA DIAMETER INDEX: 2.47 CM/M2
ECHO LA DIAMETER: 3.6 CM
ECHO LV E' LATERAL VELOCITY: 6 CM/S
ECHO LV E' SEPTAL VELOCITY: 3 CM/S
ECHO LV FRACTIONAL SHORTENING: 32 % (ref 28–44)
ECHO LV INTERNAL DIMENSION DIASTOLE INDEX: 2.12 CM/M2
ECHO LV INTERNAL DIMENSION DIASTOLIC: 3.1 CM (ref 3.9–5.3)
ECHO LV INTERNAL DIMENSION SYSTOLIC INDEX: 1.44 CM/M2
ECHO LV INTERNAL DIMENSION SYSTOLIC: 2.1 CM
ECHO LV IVSD: 1.4 CM (ref 0.6–0.9)
ECHO LV MASS 2D: 155.5 G (ref 67–162)
ECHO LV MASS INDEX 2D: 106.5 G/M2 (ref 43–95)
ECHO LV POSTERIOR WALL DIASTOLIC: 1.5 CM (ref 0.6–0.9)
ECHO LV RELATIVE WALL THICKNESS RATIO: 0.97
ECHO LVOT AREA: 2.5 CM2
ECHO LVOT DIAM: 1.8 CM
ECHO LVOT PEAK GRADIENT: 4 MMHG
ECHO LVOT PEAK VELOCITY: 1 M/S
ECHO MV A VELOCITY: 1.07 M/S
ECHO MV AREA PHT: 3.3 CM2
ECHO MV E DECELERATION TIME (DT): 228.7 MS
ECHO MV E VELOCITY: 0.61 M/S
ECHO MV E/A RATIO: 0.57
ECHO MV E/E' LATERAL: 10.17
ECHO MV E/E' RATIO (AVERAGED): 15.25
ECHO MV E/E' SEPTAL: 20.33
ECHO MV PRESSURE HALF TIME (PHT): 66.3 MS
ECHO PV MAX VELOCITY: 0.8 M/S
ECHO PV PEAK GRADIENT: 2 MMHG
ECHO RV FREE WALL PEAK S': 14 CM/S
ECHO RV INTERNAL DIMENSION: 2.8 CM
ECHO RV TAPSE: 1.7 CM (ref 1.7–?)
ECHO TV REGURGITANT MAX VELOCITY: 1.94 M/S
ECHO TV REGURGITANT PEAK GRADIENT: 15 MMHG
GLUCOSE SERPL-MCNC: 195 MG/DL (ref 65–100)
LACTATE SERPL-SCNC: 2.1 MMOL/L (ref 0.4–2)
MAGNESIUM SERPL-MCNC: 2.7 MG/DL (ref 1.6–2.4)
P-R INTERVAL, ECG05: 128 MS
PHOSPHATE SERPL-MCNC: 2.4 MG/DL (ref 2.6–4.7)
POTASSIUM SERPL-SCNC: 3.6 MMOL/L (ref 3.5–5.1)
Q-T INTERVAL, ECG07: 318 MS
QRS DURATION, ECG06: 66 MS
QTC CALCULATION (BEZET), ECG08: 408 MS
SERVICE CMNT-IMP: NORMAL
SODIUM SERPL-SCNC: 133 MMOL/L (ref 136–145)
TSH SERPL DL<=0.05 MIU/L-ACNC: 0.55 UIU/ML (ref 0.36–3.74)
VENTRICULAR RATE, ECG03: 99 BPM
VIT B12 SERPL-MCNC: >2000 PG/ML (ref 193–986)

## 2022-10-27 PROCEDURE — 93306 TTE W/DOPPLER COMPLETE: CPT | Performed by: SPECIALIST

## 2022-10-27 PROCEDURE — 83735 ASSAY OF MAGNESIUM: CPT

## 2022-10-27 PROCEDURE — C8929 TTE W OR WO FOL WCON,DOPPLER: HCPCS

## 2022-10-27 PROCEDURE — 74011000250 HC RX REV CODE- 250: Performed by: INTERNAL MEDICINE

## 2022-10-27 PROCEDURE — 80048 BASIC METABOLIC PNL TOTAL CA: CPT

## 2022-10-27 PROCEDURE — 74011250636 HC RX REV CODE- 250/636: Performed by: STUDENT IN AN ORGANIZED HEALTH CARE EDUCATION/TRAINING PROGRAM

## 2022-10-27 PROCEDURE — 74011250637 HC RX REV CODE- 250/637: Performed by: STUDENT IN AN ORGANIZED HEALTH CARE EDUCATION/TRAINING PROGRAM

## 2022-10-27 PROCEDURE — 97161 PT EVAL LOW COMPLEX 20 MIN: CPT

## 2022-10-27 PROCEDURE — 97530 THERAPEUTIC ACTIVITIES: CPT

## 2022-10-27 PROCEDURE — 74011250636 HC RX REV CODE- 250/636: Performed by: INTERNAL MEDICINE

## 2022-10-27 PROCEDURE — 82607 VITAMIN B-12: CPT

## 2022-10-27 PROCEDURE — 84443 ASSAY THYROID STIM HORMONE: CPT

## 2022-10-27 PROCEDURE — 97165 OT EVAL LOW COMPLEX 30 MIN: CPT

## 2022-10-27 PROCEDURE — 65270000046 HC RM TELEMETRY

## 2022-10-27 PROCEDURE — 83605 ASSAY OF LACTIC ACID: CPT

## 2022-10-27 PROCEDURE — 74011250637 HC RX REV CODE- 250/637: Performed by: INTERNAL MEDICINE

## 2022-10-27 PROCEDURE — 36415 COLL VENOUS BLD VENIPUNCTURE: CPT

## 2022-10-27 PROCEDURE — 74011000250 HC RX REV CODE- 250: Performed by: STUDENT IN AN ORGANIZED HEALTH CARE EDUCATION/TRAINING PROGRAM

## 2022-10-27 PROCEDURE — 84100 ASSAY OF PHOSPHORUS: CPT

## 2022-10-27 RX ORDER — FAMOTIDINE 20 MG/1
20 TABLET, FILM COATED ORAL DAILY
Status: DISCONTINUED | OUTPATIENT
Start: 2022-10-27 | End: 2022-11-01 | Stop reason: HOSPADM

## 2022-10-27 RX ORDER — MIRTAZAPINE 15 MG/1
30 TABLET, FILM COATED ORAL
Status: DISCONTINUED | OUTPATIENT
Start: 2022-10-27 | End: 2022-11-01 | Stop reason: HOSPADM

## 2022-10-27 RX ORDER — DEXTROSE, SODIUM CHLORIDE, SODIUM LACTATE, POTASSIUM CHLORIDE, AND CALCIUM CHLORIDE 5; .6; .31; .03; .02 G/100ML; G/100ML; G/100ML; G/100ML; G/100ML
50 INJECTION, SOLUTION INTRAVENOUS CONTINUOUS
Status: DISCONTINUED | OUTPATIENT
Start: 2022-10-27 | End: 2022-11-01

## 2022-10-27 RX ORDER — ENOXAPARIN SODIUM 100 MG/ML
30 INJECTION SUBCUTANEOUS DAILY
Status: DISCONTINUED | OUTPATIENT
Start: 2022-10-27 | End: 2022-10-27

## 2022-10-27 RX ORDER — MAGNESIUM SULFATE HEPTAHYDRATE 40 MG/ML
2 INJECTION, SOLUTION INTRAVENOUS ONCE
Status: COMPLETED | OUTPATIENT
Start: 2022-10-27 | End: 2022-10-27

## 2022-10-27 RX ORDER — POLYETHYLENE GLYCOL 3350 17 G/17G
17 POWDER, FOR SOLUTION ORAL DAILY PRN
Status: DISCONTINUED | OUTPATIENT
Start: 2022-10-27 | End: 2022-11-01 | Stop reason: HOSPADM

## 2022-10-27 RX ORDER — ONDANSETRON 2 MG/ML
4 INJECTION INTRAMUSCULAR; INTRAVENOUS
Status: DISCONTINUED | OUTPATIENT
Start: 2022-10-27 | End: 2022-11-01 | Stop reason: HOSPADM

## 2022-10-27 RX ORDER — FUROSEMIDE 10 MG/ML
20 INJECTION INTRAMUSCULAR; INTRAVENOUS 2 TIMES DAILY
Status: DISCONTINUED | OUTPATIENT
Start: 2022-10-27 | End: 2022-10-27

## 2022-10-27 RX ORDER — TAMSULOSIN HYDROCHLORIDE 0.4 MG/1
0.4 CAPSULE ORAL DAILY
Status: DISCONTINUED | OUTPATIENT
Start: 2022-10-27 | End: 2022-10-29

## 2022-10-27 RX ORDER — DONEPEZIL HYDROCHLORIDE 5 MG/1
5 TABLET, FILM COATED ORAL
Status: DISCONTINUED | OUTPATIENT
Start: 2022-10-27 | End: 2022-11-01 | Stop reason: HOSPADM

## 2022-10-27 RX ORDER — SODIUM CHLORIDE 0.9 % (FLUSH) 0.9 %
5-40 SYRINGE (ML) INJECTION AS NEEDED
Status: DISCONTINUED | OUTPATIENT
Start: 2022-10-27 | End: 2022-11-01 | Stop reason: HOSPADM

## 2022-10-27 RX ORDER — HEPARIN SODIUM 5000 [USP'U]/ML
5000 INJECTION, SOLUTION INTRAVENOUS; SUBCUTANEOUS EVERY 12 HOURS
Status: DISCONTINUED | OUTPATIENT
Start: 2022-10-27 | End: 2022-11-01 | Stop reason: HOSPADM

## 2022-10-27 RX ORDER — ONDANSETRON 4 MG/1
4 TABLET, ORALLY DISINTEGRATING ORAL
Status: DISCONTINUED | OUTPATIENT
Start: 2022-10-27 | End: 2022-11-01 | Stop reason: HOSPADM

## 2022-10-27 RX ORDER — SODIUM CHLORIDE 0.9 % (FLUSH) 0.9 %
5-40 SYRINGE (ML) INJECTION EVERY 8 HOURS
Status: DISCONTINUED | OUTPATIENT
Start: 2022-10-27 | End: 2022-11-01 | Stop reason: HOSPADM

## 2022-10-27 RX ORDER — ACETAMINOPHEN 325 MG/1
650 TABLET ORAL
Status: DISCONTINUED | OUTPATIENT
Start: 2022-10-27 | End: 2022-11-01 | Stop reason: HOSPADM

## 2022-10-27 RX ORDER — FAMOTIDINE 20 MG/1
40 TABLET, FILM COATED ORAL 2 TIMES DAILY
Status: DISCONTINUED | OUTPATIENT
Start: 2022-10-27 | End: 2022-10-27 | Stop reason: DRUGHIGH

## 2022-10-27 RX ORDER — SODIUM,POTASSIUM PHOSPHATES 280-250MG
1 POWDER IN PACKET (EA) ORAL 4 TIMES DAILY
Status: COMPLETED | OUTPATIENT
Start: 2022-10-27 | End: 2022-10-28

## 2022-10-27 RX ORDER — ACETAMINOPHEN 650 MG/1
650 SUPPOSITORY RECTAL
Status: DISCONTINUED | OUTPATIENT
Start: 2022-10-27 | End: 2022-11-01 | Stop reason: HOSPADM

## 2022-10-27 RX ORDER — AMMONIUM LACTATE 12 G/100G
LOTION TOPICAL 2 TIMES DAILY
Status: DISCONTINUED | OUTPATIENT
Start: 2022-10-27 | End: 2022-11-01 | Stop reason: HOSPADM

## 2022-10-27 RX ADMIN — POTASSIUM & SODIUM PHOSPHATES POWDER PACK 280-160-250 MG 1 PACKET: 280-160-250 PACK at 17:06

## 2022-10-27 RX ADMIN — Medication: at 17:06

## 2022-10-27 RX ADMIN — TAMSULOSIN HYDROCHLORIDE 0.4 MG: 0.4 CAPSULE ORAL at 18:55

## 2022-10-27 RX ADMIN — SODIUM CHLORIDE, PRESERVATIVE FREE 10 ML: 5 INJECTION INTRAVENOUS at 23:32

## 2022-10-27 RX ADMIN — FUROSEMIDE 20 MG: 10 INJECTION INTRAMUSCULAR; INTRAVENOUS at 01:13

## 2022-10-27 RX ADMIN — FAMOTIDINE 20 MG: 20 TABLET, FILM COATED ORAL at 09:56

## 2022-10-27 RX ADMIN — MAGNESIUM SULFATE HEPTAHYDRATE 2 G: 40 INJECTION, SOLUTION INTRAVENOUS at 10:06

## 2022-10-27 RX ADMIN — Medication: at 14:59

## 2022-10-27 RX ADMIN — ACETAMINOPHEN 650 MG: 325 TABLET ORAL at 09:56

## 2022-10-27 RX ADMIN — MIRTAZAPINE 30 MG: 15 TABLET, FILM COATED ORAL at 05:46

## 2022-10-27 RX ADMIN — SODIUM CHLORIDE, PRESERVATIVE FREE 10 ML: 5 INJECTION INTRAVENOUS at 01:14

## 2022-10-27 RX ADMIN — SODIUM CHLORIDE, PRESERVATIVE FREE 10 ML: 5 INJECTION INTRAVENOUS at 14:59

## 2022-10-27 RX ADMIN — SODIUM CHLORIDE, PRESERVATIVE FREE 10 ML: 5 INJECTION INTRAVENOUS at 05:47

## 2022-10-27 RX ADMIN — HEPARIN SODIUM 5000 UNITS: 5000 INJECTION INTRAVENOUS; SUBCUTANEOUS at 14:59

## 2022-10-27 RX ADMIN — PERFLUTREN 1 ML: 6.52 INJECTION, SUSPENSION INTRAVENOUS at 17:50

## 2022-10-27 RX ADMIN — POTASSIUM & SODIUM PHOSPHATES POWDER PACK 280-160-250 MG 1 PACKET: 280-160-250 PACK at 18:00

## 2022-10-27 RX ADMIN — SODIUM CHLORIDE, SODIUM LACTATE, POTASSIUM CHLORIDE, CALCIUM CHLORIDE AND DEXTROSE MONOHYDRATE 50 ML/HR: 5; 600; 310; 30; 20 INJECTION, SOLUTION INTRAVENOUS at 15:31

## 2022-10-27 RX ADMIN — DONEPEZIL HYDROCHLORIDE 5 MG: 5 TABLET, FILM COATED ORAL at 05:46

## 2022-10-27 NOTE — PROGRESS NOTES
Has a final transfer review been performed? Yes    Reason for Admission: Acute Cystis  Patient comes from: home  Mental Status: Confused  Patient has dementia  ADL:total assistance  Ambulation:ambulates with: cane and 2 person assist to bedside commode  Pertinent Info/Safety Concerns: Patient confused in  COVID Status: Comments: Positive covid test on last admission  MEWS Score: 1       Vitals:    10/26/22 1150   BP: 122/64   Pulse: 93   Resp: 20   Temp: 98.1 °F (36.7 °C)   SpO2: 94%   Weight: 51 kg (112 lb 7 oz)     Lines:   Peripheral IV 10/26/22 Right Antecubital (Active)      Transport mode:ED stretcher    [facility]  being transferred to (unit) for   612.   \"Notification of etransfer note given to Angel Palacio (Name)

## 2022-10-27 NOTE — NURSE NAVIGATOR
HEART FAILURE NURSE NAVIGATOR NOTE  [facility]    Patient chart was reviewed by Heart Failure (HF) Nurse Navigators for compliance of prescribed treatment with guidelines directed medical therapy (GDMT) and HF database completed. Please, review beneath recommendations for symptomatic patients with HF with Preserved Ejection Fraction ? 50% (HFpEF) for your consideration when taking care of this patient. Current HF Medical Therapy:      Name Patricia Matthews    3/15/1927   LVEF Pending; previous EF 66% (echo dated 21)   NYHA Functional Class Not documented   ARNi/ACEi/ARB Not ordered. See below recommendations   Aldosterone Antagonist Not ordered. See below recommendations   SGLT2 inhibitor Not ordered. See below recommendations   Consulting Cardiologist Not consulted     Recommendations for HF Management:    For patients with HFpEF ? 50%, consider adding the following GDMT, if appropriate:  SGLT2 inhibitor [Class 2a]  ARNi or ARB [Class 2b]  Aldosterone antagonist [Class 2b]  Adjust antihypertensive therapy [Class 1]  Adjust diuretic dose at discharge if hospitalized for volume overload [Class 1]  For patient with hyperkalemia while on RAASi > 5.5, consider adding potassium binders (patiromer, sodium zirconium cycosilicate) [Class 2a]    Patients with suspected cardiac amyloidosis (older > 61years old with LVH > 1.2cm and/or any other signs of amyloidosis) should be offered screening labs and imaging [Class 1]: (a) serum gammopathy profile and UPEP with immunofixation, and (b) PYP test. If PYP test is positive patient should have genetic testing done for inherited ATTR amyloidosis. If any findings are positive or you need genetic testing ordered, please, consider in-patient consultation or referral to [address] [place] [address]. Note that the following medications may be potentially harmful in heart failure [Class 3].   Calcium channel blockers (doxazosin, diltiazem, verapamil, nifedipine)  Antiarrhythmics (flecanide, disopyrimide, sotalol, dronedarone)  Diabetes medications (thiasolidinediones, saxagliptin, alogliptin)  NSAIDs and CARRASCO 2 inhibitors    Consider vaccinations for respiratory illnesses (flu, pneumonia, covid) [Class 2b]    Due to h/o recurrent hospitalizations this patient may benefit from referral to Advanced Heart Failure Program to assess suitability for advanced therapies, such as left-ventricular assist device, heart transplantation, palliative inotropes or palliation [Class 1]. To obtain in-patient consultation or refer to [address] [place] [address], call [phone]    Patient Heart Failure Education:     Teach back in heart failure education provided, including information about medical therapy, lifestyle modifications, diet and fluid restrictions, physical activity. Educational resources provided: Living with Heart Failure booklet; Signs/Symptoms magnet; Weight Calendar; Dis[facility] flyer; Preparing for Successful Discharge check list.  Information provided about HF support group. Heart failure avoiding triggers on discharge instructions. Plan for Transitional Care:    Post discharge follow up phone call to be made within 48-72 hours of discharge. Patient will follow-up with PCP. Patient will follow-up with Primary Cardiologist.  Patient screened for obstructive sleep apnea and referred to Sleep Medicine. Referral/follow-up with Cardiac Rehabilitation. Referral/follow-up with Advanced Heart Failure Specialist.  Referral/follow-up with Palliative Care Specialist.        Heart Failure Nurse Navigator  Phone: [phone] / [phone]    *Recommendations listed above are based on the guidelines: 2022 AHA/ACC/HFSA Guideline for the Management of Heart Failure: A Report of the [address] on Clinical Practice Guidelines.  Circulation 2022; . and 2017 AHA/ACC/HRS guideline for management of patients with ventricular arrhythmias and the prevention of sudden cardiac death: A Report of the American College of Cardiology/American Heart Association Task Force on Clinical Practice Guidelines and the Heart Rhythm Society.  Heart Rhythm, Vol 15, No 10, October 2018 *Class of Recommendation: Class 1 (strong), Class 2a (moderate), Class 2b (weak), Class 3 (not recommended, potentially harmful)

## 2022-10-27 NOTE — PROGRESS NOTES
Problem: Mobility Impaired (Adult and Pediatric)  Goal: *Acute Goals and Plan of Care (Insert Text)  Description: FUNCTIONAL STATUS PRIOR TO ADMISSION: patient ambulatory at time of discharge last admission with RW but upon returning home stopped mobilizing with overall decline. HOME SUPPORT PRIOR TO ADMISSION: The patient lived with daughter and son in law. Has caregiver during the day. Physical Therapy Goals  Initiated 10/27/2022  1. Patient will move from supine to sit and sit to supine  and roll side to side in bed with moderate assistance  within 7 day(s). 2.  Patient will transfer from bed to chair and chair to bed with moderate assistance  using the least restrictive device within 7 day(s). 3.  Patient will perform sit to stand with moderate assistance  within 7 day(s). Outcome: Progressing Towards Goal   PHYSICAL THERAPY EVALUATION  Patient: Kassy Rivas (89 y.o. female)  Date: 10/27/2022  Primary Diagnosis: CHF (congestive heart failure) ([facility]) [I50.9]  Acute cystitis [N30.00]       Precautions: fall       ASSESSMENT  Based on the objective data described below, the patient presents with significant decline in mobility since last admission and evaluated on 10/18/22 and was ambulatory with RW with light min assist.  Today patient lethargic but did awaken, eyes open and responding to questions. Required near total assist for all mobility to get to EOB and sat EOB with minimal assist to supervision but profound forward flexed with head down and unable to lift to look up. Would not initiate any active ROM of extremities. Daughter present to assist with direction and information gathering. Returned patient to bed and positioned for comfort. .    Current Level of Function Impacting Discharge (mobility/balance): max to total assist for basic bed mobility; non ambulatory        Other factors to consider for discharge: significant decline since last admission     Patient will benefit from skilled therapy intervention to address the above noted impairments. PLAN :  Recommendations and Planned Interventions: bed mobility training, transfer training, therapeutic exercises, patient and family training/education, and therapeutic activities      Frequency/Duration: Patient will be followed by physical therapy:  3 times a week to address goals. Recommendation for discharge: (in order for the patient to meet his/her long term goals)  To be determined: family will take home  ; hospice? Comfort? This discharge recommendation:  Has not yet been discussed the attending provider and/or case management    IF patient discharges home will need the following DME: patient owns DME required for discharge         SUBJECTIVE:   Patient stated tired    OBJECTIVE DATA SUMMARY:   HISTORY:    Past Medical History:   Diagnosis Date    Dementia     Endocrine disease     adrenal gland issues    Gastrointestinal disorder     C.  Diff    Hypertension     Malaria     UTI (lower urinary tract infection)      Past Surgical History:   Procedure Laterality Date    HX ORTHOPAEDIC      ORIF arm       Personal factors and/or comorbidities impacting plan of care:     Home Situation  Home Environment: Private residence  # Steps to Enter: 4  One/Two Story Residence: One story  Living Alone: No  Support Systems: Child(asha), Caregiver/Home Care Staff  Patient Expects to be Discharged to[de-identified] Home with family assistance  Current DME Used/Available at Home: Cane, straight, Commode, bedside, Shower chair, Walker, rolling    EXAMINATION/PRESENTATION/DECISION MAKING:   Critical Behavior:   lethargic         Range Of Motion:  AROM: Grossly decreased, non-functional                       Strength:    Strength: Grossly decreased, non-functional                    Tone & Sensation:   Tone: Normal                              Coordination:  Coordination: Grossly decreased, non-functional  Vision:      Functional Mobility:  Bed Mobility:  Rolling: Total assistance  Supine to Sit: Maximum assistance  Sit to Supine: Total assistance  Scooting: Total assistance        Balance:   Sitting: Impaired; Without support  Sitting - Static: Fair (occasional)  Sitting - Dynamic: Poor (constant support)           Physical Therapy Evaluation Charge Determination   History Examination Presentation Decision-Making   MEDIUM  Complexity : 1-2 comorbidities / personal factors will impact the outcome/ POC  LOW Complexity : 1-2 Standardized tests and measures addressing body structure, function, activity limitation and / or participation in recreation  LOW Complexity : Stable, uncomplicated        Based on the above components, the patient evaluation is determined to be of the following complexity level: LOW     Pain Ratin    Activity Tolerance:   Poor    After treatment patient left in no apparent distress:   Patient positioned in left sidelying for pressure relief, Call bell within reach, Caregiver / family present, and Side rails x 3    COMMUNICATION/EDUCATION:   The patients plan of care was discussed with: Registered nurse and Case management. Patient/family have participated as able in goal setting and plan of care., Patient/family agree to work toward stated goals and plan of care. , and Patient is unable to participate in goal setting and plan of care.     Thank you for this referral.  Giovanni Estrada, PT   Time Calculation: 19 mins

## 2022-10-27 NOTE — H&P
History & Physical    Primary Care Provider: Aliyah Weaver MD  Source of Information: Patient and chart review    History of Presenting Illness:   Carlyn Mata is a 80 y.o. female Carlyn Mata is a 80 y.o. female with past medical history of dementia with behavioral disturbance, GERD, hypertension, recurrent uti who presents to ER via EMS for concerns of fever, lethargy and pneumonia. Daughter at bedside states patient has been increasingly weak and lethargic over the last 48 hours. Family contacted dispatch [facility] who evaluated patient at home. She was diagnosed with dehydration but was unable to receive IV fluids due to lack of IV access. Furthermore family states chest x-ray obtained by dispatch [facility] showed pneumonia and she was referred to hospital.    Remarkable vitals on ER Presentations: vss  Labs Remarkable for: mag 1.5, bnp 1440  ER Images: Chest x-ray showed mild interstitial edema pattern  ER treatment: NONE     Review of Systems:  Pertinent items are noted in the History of Present Illness. Past Medical History:   Diagnosis Date    Dementia     Endocrine disease     adrenal gland issues    Gastrointestinal disorder     C. Diff    Hypertension     Malaria     UTI (lower urinary tract infection)       Past Surgical History:   Procedure Laterality Date    HX ORTHOPAEDIC      ORIF arm     Prior to Admission medications    Medication Sig Start Date End Date Taking? Authorizing Provider   famotidine (PEPCID) 40 mg tablet Take 40 mg by mouth two (2) times a day. Yes Provider, Historical   cyanocobalamin 1,000 mcg tablet Take 1,000 mcg by mouth daily. Yes Provider, Historical   calcium carbonate 500 mg calcium (1,250 mg) capsule Take 2 Tablets by mouth daily. Yes Provider, Historical   L.acid,para-B. bifidum-S.therm (RISAQUAD) 8 billion cell cap cap Take 1 Capsule by mouth daily for 30 days.  10/20/22 11/19/22 Yes Jailyn Barrios NP   ondansetron (ZOFRAN ODT) 4 mg disintegrating tablet Take 1 Tab by mouth every eight (8) hours as needed for Nausea. 8/18/19  Yes Katie Cornejo MD   mirtazapine (REMERON) 30 mg tablet Take 30 mg by mouth nightly. Yes Provider, Historical   b complex vitamins tablet Take 1 Tab by mouth daily. Yes Provider, Historical   ascorbic acid, vitamin C, (VITAMIN C) 1,000 mg tablet Take 1,000 mg by mouth daily. Yes Provider, Historical   donepezil (ARICEPT) 5 mg tablet Take 5 mg by mouth nightly. Provider, Historical     Allergies   Allergen Reactions    Aspirin Other (comments)     Gi upset    Sulfa (Sulfonamide Antibiotics) Rash    Lidocaine Unknown (comments)     Novocaine    Novocain [Procaine] Unknown (comments)    Penicillins Nausea and Vomiting      History reviewed. No pertinent family history. SOCIAL HISTORY:  Patient resides:  Independently x   Assisted Living    SNF    With family care       Smoking history:   None x   Former    Chronic      Alcohol history:   None x   Social    Chronic      Ambulates:   Independently x   w/cane    w/walker    w/wc    CODE STATUS:  DNR    Full x   Other      Objective:     Physical Exam:     Visit Vitals  /64 (BP 1 Location: Right upper arm)   Pulse 93   Temp 98.1 °F (36.7 °C)   Resp 20   Wt 51 kg (112 lb 7 oz)   SpO2 94%   BMI 21.96 kg/m²      O2 Device: None (Room air)    General:  Alert, cooperative, no distress, appears stated age. Head:  Normocephalic, without obvious abnormality, atraumatic. Eyes:  Conjunctivae/corneas clear. PERRL, EOMs intact. Nose: Nares normal. Septum midline. Mucosa normal.        Neck: Supple, symmetrical, trachea midline, no carotid bruit and no JVD. Lungs:   Clear to auscultation bilaterally. Chest wall:  No tenderness or deformity. Heart:  Regular rate and rhythm, S1, S2 normal, no murmur, click, rub or gallop. Abdomen:   Soft, non-tender. Bowel sounds normal. No masses,  No organomegaly.    Extremities: Extremities normal, atraumatic, no cyanosis or edema. Pulses: 2+ and symmetric all extremities. Skin: Skin color, texture, turgor normal. No rashes or lesions   Neurologic: CNII-XII intact. EKG:  nsr      Data Review:     Recent Days:  Recent Labs     10/26/22  1249   WBC 7.5   HGB 11.4*   HCT 34.2*        Recent Labs     10/26/22  1249      K 4.2      CO2 23      BUN 15   CREA 1.22*   CA 9.2   MG 1.5*   ALB 2.8*   ALT 16     No results for input(s): PH, PCO2, PO2, HCO3, FIO2 in the last 72 hours. 24 Hour Results:  Recent Results (from the past 24 hour(s))   CBC WITH AUTOMATED DIFF    Collection Time: 10/26/22 12:49 PM   Result Value Ref Range    WBC 7.5 3.6 - 11.0 K/uL    RBC 4.12 3.80 - 5.20 M/uL    HGB 11.4 (L) 11.5 - 16.0 g/dL    HCT 34.2 (L) 35.0 - 47.0 %    MCV 83.0 80.0 - 99.0 FL    MCH 27.7 26.0 - 34.0 PG    MCHC 33.3 30.0 - 36.5 g/dL    RDW 14.8 (H) 11.5 - 14.5 %    PLATELET 071 954 - 063 K/uL    MPV 12.1 8.9 - 12.9 FL    NRBC 0.0 0  WBC    ABSOLUTE NRBC 0.00 0.00 - 0.01 K/uL    NEUTROPHILS 69 32 - 75 %    LYMPHOCYTES 14 12 - 49 %    MONOCYTES 16 (H) 5 - 13 %    EOSINOPHILS 0 0 - 7 %    BASOPHILS 0 0 - 1 %    IMMATURE GRANULOCYTES 1 (H) 0.0 - 0.5 %    ABS. NEUTROPHILS 5.2 1.8 - 8.0 K/UL    ABS. LYMPHOCYTES 1.0 0.8 - 3.5 K/UL    ABS. MONOCYTES 1.2 (H) 0.0 - 1.0 K/UL    ABS. EOSINOPHILS 0.0 0.0 - 0.4 K/UL    ABS. BASOPHILS 0.0 0.0 - 0.1 K/UL    ABS. IMM.  GRANS. 0.1 (H) 0.00 - 0.04 K/UL    DF AUTOMATED     METABOLIC PANEL, COMPREHENSIVE    Collection Time: 10/26/22 12:49 PM   Result Value Ref Range    Sodium 136 136 - 145 mmol/L    Potassium 4.2 3.5 - 5.1 mmol/L    Chloride 104 97 - 108 mmol/L    CO2 23 21 - 32 mmol/L    Anion gap 9 5 - 15 mmol/L    Glucose 100 65 - 100 mg/dL    BUN 15 6 - 20 MG/DL    Creatinine 1.22 (H) 0.55 - 1.02 MG/DL    BUN/Creatinine ratio 12 12 - 20      eGFR 41 (L) >60 ml/min/1.73m2    Calcium 9.2 8.5 - 10.1 MG/DL    Bilirubin, total 1.2 (H) 0.2 - 1.0 MG/DL ALT (SGPT) 16 12 - 78 U/L    AST (SGOT) 34 15 - 37 U/L    Alk. phosphatase 78 45 - 117 U/L    Protein, total 7.8 6.4 - 8.2 g/dL    Albumin 2.8 (L) 3.5 - 5.0 g/dL    Globulin 5.0 (H) 2.0 - 4.0 g/dL    A-G Ratio 0.6 (L) 1.1 - 2.2     MAGNESIUM    Collection Time: 10/26/22 12:49 PM   Result Value Ref Range    Magnesium 1.5 (L) 1.6 - 2.4 mg/dL   SAMPLES BEING HELD    Collection Time: 10/26/22 12:49 PM   Result Value Ref Range    SAMPLES BEING HELD 1BLU 1RED     COMMENT        Add-on orders for these samples will be processed based on acceptable specimen integrity and analyte stability, which may vary by analyte. TROPONIN-HIGH SENSITIVITY    Collection Time: 10/26/22 12:49 PM   Result Value Ref Range    Troponin-High Sensitivity 32 0 - 51 ng/L   LACTIC ACID    Collection Time: 10/26/22 12:49 PM   Result Value Ref Range    Lactic acid 2.8 (HH) 0.4 - 2.0 MMOL/L   NT-PRO BNP    Collection Time: 10/26/22 12:49 PM   Result Value Ref Range    NT pro-BNP 1,440 (H) <450 PG/ML   EKG, 12 LEAD, INITIAL    Collection Time: 10/26/22  3:36 PM   Result Value Ref Range    Ventricular Rate 99 BPM    Atrial Rate 99 BPM    P-R Interval 128 ms    QRS Duration 66 ms    Q-T Interval 318 ms    QTC Calculation (Bezet) 408 ms    Calculated P Axis 35 degrees    Calculated R Axis -18 degrees    Calculated T Axis -4 degrees    Diagnosis       Normal sinus rhythm  Possible Lateral infarct , age undetermined  Abnormal ECG  When compared with ECG of 22-FEB-2021 18:08,  Borderline criteria for Lateral infarct are now present           Imaging:     Assessment:     Norberto Corona is a 80 y.o. female Norberto Corona is a 80 y.o. female with past medical history of dementia with behavioral disturbance, GERD, hypertension, recurrent uti who is admitted for pulmonary edema.        Plan:       Pulmonary edema / Volume Overload  -Start diuresis with Lasix 20 mg IV bid  -Strict I's and O's  -Echocardiogram in a.m.  -Cardiology consult pending echo    Dementia  -Continue Remeron and Aricept    Hypertension  -Monitor and treat.   Medications    GERD  -Home Pepcid          FEN/GI -  PO  Activity - As tolerated  DVT prophylaxis - SCDs  GI prophylaxis -  NI  Disposition - TBD     CODE STATUS:  dnr         Signed By: Jimmy Johnson MD     October 26, 2022

## 2022-10-27 NOTE — PROGRESS NOTES
Bedside shift change report given to Anisa Hoffman RN (oncoming nurse) by [facility], RN (offgoing nurse). Report included the following information SBAR, Kardex, ED Summary, OR Summary, Procedure Summary, Intake/Output, MAR, Recent Results, and Med Rec Status.

## 2022-10-27 NOTE — PROGRESS NOTES
Transition of Care    Reason for Admission: Pneumonia and Dehydration                   RUR Score:   12%                  Plan for utilizing home health:  Mrs. Boyd Martin was agreeable for home health services. Mrs. Sarah Ureña is open with [facility]. Resumption of care home health orders will be needed. PCP: First and Last name:  Kalyan Haddad MD     Name of Practice: [facility]   Are you a current patient: Yes/No: Yes   Approximate date of last visit: 8/26/2022   Can you participate in a virtual visit with your PCP: Yes                    Current Advanced Directive/Advance Care Plan: DNR  Mrs. Sarah Ureña does have advance care documents. Healthcare Decision Maker:   Click here to complete [address] including selection of the Healthcare Decision Maker Relationship (ie \"Primary\")             Primary Decision MakerViljesus Mckeon Child - [phone]    Secondary Decision Maker: Ramiro Simpson Child - [phone]                  Transition of Care Plan:   Interpretor # [id number] was used. Mrs. Sarah Ureña and Mrs. Boyd Martin, daughter. Mrs. Sarah Ureña was asleep during the interview. Mrs. Boyd Martin verified Mrs. Andrea's address and phone number. SHe reported that Mrs. Sarah Ureña lives with her sister and the sister's  in story, single family residence with 2 steps to enter. She has a walker, shower chair, and a bedside commode. She needs assistance with her ADLs and IADLs such as dressing and bathing. Mrs. Sarah Ureña was able to feed herself. Her pharmacy is the [facility]. She is able to afford and acquire her medications. Mrs. Sarah Ureña will needs BLS transport at discharge. Care Management Interventions  PCP Verified by CM:  Yes  Last Visit to PCP: 08/26/22  Palliative Care Criteria Met (RRAT>21 & CHF Dx)?: No  Mode of Transport at Discharge: S  Transition of Care Consult (CM Consult): Discharge Planning  Fidencio Signup: No  Discharge Durable Medical Equipment: No  Physical Therapy Consult: Yes  Occupational Therapy Consult: Yes  Speech Therapy Consult: No  Support Systems: Child(asha)  Confirm Follow Up Transport: Family  The Patient and/or Patient Representative was Provided with a Choice of Provider and Agrees with the Discharge Plan?: No  Freedom of Choice List was Provided with Basic Dialogue that Supports the Patient's Individualized Plan of Care/Goals, Treatment Preferences and Shares the Quality Data Associated with the Providers?: No  [place] Resource Information Provided?: No  Discharge Location  Patient Expects to be Discharged to[de-identified] Home with family assistance    Will continue to follow for discharge planning.   Signed By: Wilner Matthew LCSW     October 27, 2022

## 2022-10-27 NOTE — PROGRESS NOTES
[facility] Adult  Hospitalist Group                                                                                          Hospitalist Progress Note  Remedios Encarnacion MD  Answering service: [phone] from in house phone        Date of Service:  10/27/2022  NAME:  Phylicia Glez  :  3/15/1927  MRN:  [id number]      Admission Summary:   Phylicia Glez is a 80 y.o. female Phylicia Glez is a 80 y.o. female with past medical history of dementia with behavioral disturbance, GERD, hypertension, recurrent uti who presents to ER via EMS for concerns of fever, lethargy and pneumonia. Daughter at bedside states patient has been increasingly weak and lethargic over the last 48 hours. Family contacted dispatch [facility] who evaluated patient at home. She was diagnosed with dehydration but was unable to receive IV fluids due to lack of IV access.   Furthermore family states chest x-ray obtained by dispatch health showed pneumonia and she was referred to hospital.     Remarkable vitals on ER Presentations: vss  Labs Remarkable for: mag 1.5, bnp 1440  ER Images: Chest x-ray showed mild interstitial edema pattern  ER treatment: NONE     Interval history / Subjective:   No acute overnight events  Daughter and her  are at bedside and translate for pt  Pt denies any pain, states comfortable  No cp or sob  Very weak-appearing, minimally interactive  Pt has poor appetite, not eating  Daughter reports good UOP overnight s/p lasix x1     Assessment & Plan:     Mild pulmonary edema  clinically improving s/p diuresis with Lasix 20 mg IV x1  Will hold giving any more diuretics d/t no PO intake and risk of dehydration  Last echo 2021 was wnl, repeat pending    Failure to thrive, anorexia  Likely r/t recent Covid infection 2 weeks ago, complicated by underlying dementia  Encourage PO intake as able  Already takes Remeron  Mild elev lactate, improving  Start gentle mIVF for now  TSH, B12 wnl    Hypophos, replete    AMY  S/p IV lasix x1  Start gentle IVF as above    Dementia  Continue home Remeron and Aricept      GERD  Home Pepcid    Overall prognosis is poor. If pt does not improve in the next 1-2 days, consider hospice      Code status: DNR  Prophylaxis: Heparin ppx  Care Plan discussed with: Patient/Family, Nurse, and   Anticipated Disposition:  tbd  Anticipated Discharge: Greater than 48 hours    CRITICAL CARE ATTESTATION:  I had a face to face encounter with the patient, reviewed and interpreted patient data including clinical events, labs, images, vital signs, I/O's, and examined patient. I have discussed the case and the plan and management of the patient's care with the consulting services, the bedside nurses and necessary ancillary providers. NOTE OF PERSONAL INVOLVEMENT IN CARE   This patient has a high probability of imminent, clinically significant deterioration, which requires the highest level of preparedness to intervene urgently. I participated in the decision-making and personally managed or directed the management of the following life and organ supporting interventions that required my frequent assessment to treat or prevent imminent deterioration. I personally spent 30 minutes of critical care time. This is time spent at this critically ill patient's bedside actively involved in patient care as well as the coordination of care and discussions with the patient's family. This does not include any procedural time which has been billed separately. Hospital Problems  Date Reviewed: 11/6/2019            Codes Class Noted POA    CHF (congestive heart failure) ([facility] [address] ICD-10-CM: I50.9  ICD-9-CM: 428.0  10/26/2022 Unknown        Acute cystitis ICD-10-CM: N30.00  ICD-9-CM: 595.0  2/22/2021 Unknown           Review of Systems:   Pertinent items are noted in HPI    Vital Signs:    Last 24hrs VS reviewed since prior progress note.  Most recent are:  Visit Vitals  BP 120/77 (BP 1 Location: Left upper arm, BP Patient Position: At rest)   Pulse 72   Temp 99.5 °F (37.5 °C)   Resp 17   Wt 51 kg (112 lb 7 oz)   SpO2 93%   BMI 21.96 kg/m²         Intake/Output Summary (Last 24 hours) at 10/27/2022 1506  Last data filed at 10/27/2022 0604  Gross per 24 hour   Intake --   Output 550 ml   Net -550 ml        Physical Examination:   I had a face to face encounter with this patient and independently examined them on 10/27/2022 as outlined below:    General: Awake but weak-appearing, frail, no acute distress    EENT:  Anicteric sclerae. Dry MM  Resp:  CTA bilaterally, no wheezing or rales. No accessory muscle use  CV:  RRR, No audible murmur  GI:  Soft, Non distended, Non tender  Neurologic:  Awake, minimally interactive, resting comfortably, KIRKPATRICK  Extremities: No edema or cyanosis  Psych:   Not anxious or agitated  Skin:  No rashes. No jaundice       Data Review:   I personally reviewed: vitals, labs, imaging results, notes    Labs:     Recent Labs     10/26/22  1249   WBC 7.5   HGB 11.4*   HCT 34.2*        Recent Labs     10/27/22  1250 10/26/22  1249   * 136   K 3.6 4.2    104   CO2 22 23   BUN 20 15   CREA 1.83* 1.22*   * 100   CA 9.1 9.2   MG 2.7* 1.5*   PHOS 2.4*  --      Recent Labs     10/26/22  1249   ALT 16   AP 78   TBILI 1.2*   TP 7.8   ALB 2.8*   GLOB 5.0*     No results for input(s): INR, PTP, APTT, INREXT in the last 72 hours. No results for input(s): FE, TIBC, PSAT, FERR in the last 72 hours. Lab Results   Component Value Date/Time    Folate 75.0 (H) 02/22/2021 07:06 PM      No results for input(s): PH, PCO2, PO2 in the last 72 hours. No results for input(s): CPK, CKNDX, TROIQ in the last 72 hours.     No lab exists for component: CPKMB  No results found for: CHOL, CHOLX, CHLST, CHOLV, HDL, HDLP, LDL, LDLC, DLDLP, TGLX, TRIGL, TRIGP, CHHD, CHHDX  No results found for: [facility]  Lab Results   Component Value Date/Time    Color DARK YELLOW 10/26/2022 10:30 PM    Appearance CLEAR 10/26/2022 10:30 PM    Specific gravity 1.017 10/26/2022 10:30 PM    pH (UA) 5.5 10/26/2022 10:30 PM    Protein 100 (A) 10/26/2022 10:30 PM    Glucose Negative 10/26/2022 10:30 PM    Ketone 15 (A) 10/26/2022 10:30 PM    Bilirubin Negative 10/26/2022 10:30 PM    Urobilinogen 0.2 10/26/2022 10:30 PM    Nitrites Negative 10/26/2022 10:30 PM    Leukocyte Esterase TRACE (A) 10/26/2022 10:30 PM    Epithelial cells FEW 10/26/2022 10:30 PM    Bacteria Negative 10/26/2022 10:30 PM    WBC 10-20 10/26/2022 10:30 PM    RBC 0-5 10/26/2022 10:30 PM       Medications Reviewed:     Current Facility-Administered Medications   Medication Dose Route Frequency    sodium chloride (NS) flush 5-40 mL  5-40 mL IntraVENous Q8H    sodium chloride (NS) flush 5-40 mL  5-40 mL IntraVENous PRN    acetaminophen (TYLENOL) tablet 650 mg  650 mg Oral Q6H PRN    Or    acetaminophen (TYLENOL) suppository 650 mg  650 mg Rectal Q6H PRN    polyethylene glycol (MIRALAX) packet 17 g  17 g Oral DAILY PRN    ondansetron (ZOFRAN ODT) tablet 4 mg  4 mg Oral Q8H PRN    Or    ondansetron (ZOFRAN) injection 4 mg  4 mg IntraVENous Q6H PRN    donepeziL (ARICEPT) tablet 5 mg  5 mg Oral QHS    mirtazapine (REMERON) tablet 30 mg  30 mg Oral QHS    famotidine (PEPCID) tablet 20 mg  20 mg Oral DAILY    ammonium lactate (LAC-HYDRIN) 12 % lotion   Topical BID    heparin (porcine) injection 5,000 Units  5,000 Units SubCUTAneous Q12H    sodium chloride (NS) flush 5-10 mL  5-10 mL IntraVENous PRN     ______________________________________________________________________  EXPECTED LENGTH OF STAY: 2d 14h  ACTUAL LENGTH OF STAY:          1                 Ronel Thomson MD

## 2022-10-27 NOTE — PROGRESS NOTES
Patient/family seen: YES       Informed patient/family of BPCI-A Bundle Program. Also advised of potential outreach by Care Transitions Team.    Bundle Payment Care Improvement Beneficiary Letter Delivered to Beneficiary or Representative:YES.  Date BCPI -A was given: 10/27/22

## 2022-10-27 NOTE — PROGRESS NOTES
Problem: Self Care Deficits Care Plan (Adult)  Goal: *Acute Goals and Plan of Care (Insert Text)  Description: FUNCTIONAL STATUS PRIOR TO ADMISSION: Patient was recently admitted to hospital and was discharged 10/18. Per therapy notes she was walking SBA with RW. Per family since returning home patient had been unable to participate in any of her self care and they were pushing her on her rollator seat for mobility. HOME SUPPORT: The patient lived with daughter and son-in-law and had a full time caregiver she required assistance with all ADLs; she was requiring total A for toileting and bathing and up to Max A for dressing, feeding and grooming    Occupational Therapy Goals  Initiated 10/27/2022  1. Patient will perform self-feeding with minimal assistance/contact guard assist within 7 day(s). 2.  Patient will perform upper body dressing with moderate assistance  within 7 day(s). 3.  Patient will perform SPT to recliner chair or BSC with moderate assistance  within 7 day(s). 4.  Patient will participate in upper extremity therapeutic exercise/activities with minimal assistance/contact guard assist for 10 minutes within 7 day(s). 10/27/2022 1240 by Siri Villar OT  Outcome: Not Met   OCCUPATIONAL THERAPY EVALUATION  Patient: Enrique Ramos (76 y.o. female)  Date: 10/27/2022  Primary Diagnosis: [facility] [I50.9]  Acute cystitis [N30.00]       Precautions: Fall risk       ASSESSMENT  Based on the objective data described below, the patient presents with decreased bed mobility, impaired sitting balance, global weakness, decreased independence with all ADLs, and poor state/arousal. Patient received sleeping with dtr at bedside, patient understands some english but dtr translates during session today.  Patient unable to maintain alert state throughout therapy evaluation today, intermittently closing eyes and demo'd inability to keep head raised in sitting with posture extremely flexed forward at EOB. Assisted patient back to bed at end of session, sleeping soundly in L sidelying for comfort. Will continue to follow while inpatient to trial therapy and ability to participate. Spoke with daughter at bedside regarding disposition, daughter stating they want to take patient home at discharge, agreeable to [facility] therapies if patient able to participate. Current Level of Function Impacting Discharge (ADLs/self-care): Total A bed mobility, Up to Total A for all ADLs    Functional Outcome Measure: The patient scored Total: 5/100 on the Barthel Index outcome measure      Other factors to consider for discharge: could likely benefit from palliative consult, pending goals of care per family     Patient will benefit from skilled therapy intervention to address the above noted impairments. PLAN :  Recommendations and Planned Interventions: self care training, therapeutic exercise, therapeutic activities, endurance activities, patient education, home safety training, and family training/education    Frequency/Duration: Patient will be followed by occupational therapy 2 times a week to address goals. Recommendation for discharge: (in order for the patient to meet his/her long term goals)  To be determined: HHOT likely if family agreeable, family wants to take her home    This discharge recommendation:  A follow-up discussion with the attending provider and/or case management is planned    IF patient discharges home will need the following DME: patient owns DME required for discharge       SUBJECTIVE:   Patient stated [place].  re:when asked her name    OBJECTIVE DATA SUMMARY:   HISTORY:   Past Medical History:   Diagnosis Date    Dementia     Endocrine disease     adrenal gland issues    Gastrointestinal disorder     C.  Diff    Hypertension     Malaria     UTI (lower urinary tract infection)      Past Surgical History:   Procedure Laterality Date    HX ORTHOPAEDIC      ORIF arm Expanded or extensive additional review of patient history:     Home Situation  Home Environment: Private residence  # Steps to Enter: 4  One/Two Story Residence: One story  Living Alone: No  Support Systems: Child(asha), Caregiver/Home Care Staff  Patient Expects to be Discharged to[de-identified] Home with family assistance  Current DME Used/Available at Home: Cane, straight, Commode, bedside, Shower chair, Walker, rolling    Hand dominance: Right    EXAMINATION OF PERFORMANCE DEFICITS:  Cognitive/Behavioral Status:  Neurologic State: Drowsy; Eyes open to voice  Orientation Level: Disoriented to person  Cognition: Impaired decision making;Memory loss; Recognition of people/places; Decreased attention/concentration  Perception: Cues to attend left visual field;Cues to attend right visual field  Perseveration: No perseveration noted  Safety/Judgement: Decreased awareness of environment    Hearing: Auditory  Auditory Impairment: None    Vision/Perceptual:    Tracking: Unable to test secondary due to decreased visual attention         Range of Motion:  AROM: Grossly decreased, non-functional       Strength:  Strength: Grossly decreased, non-functional     Coordination:  Coordination: Grossly decreased, non-functional  Fine Motor Skills-Upper:  (unable to assess, patient extremely weak)    Gross Motor Skills-Upper: Left Impaired;Right Impaired    Tone & Sensation:  Tone: Normal     Balance:  Sitting: Impaired; Without support  Sitting - Static: Fair (occasional)  Sitting - Dynamic: Poor (constant support)    Functional Mobility and Transfers for ADLs:  Bed Mobility:  Rolling: Total assistance  Supine to Sit: Maximum assistance  Sit to Supine: Total assistance  Scooting: Total assistance    Transfers:       ADL Assessment:  Feeding: Maximum assistance    Oral Facial Hygiene/Grooming: Maximum assistance    Bathing:  Total assistance    Type of Bath: Patient refused    Upper Body Dressing: Maximum assistance    Lower Body Dressing: Total assistance    Toileting: Total assistance       ADL Intervention and task modifications:       Grooming  Washing Face: Total assistance (dependent)  Brushing/Combing Hair: Total assistance (dependent)         Cognitive Retraining  Following Commands: Follows one step commands/directions  Safety/Judgement: Decreased awareness of environment  Cues: Tactile cues provided;Verbal cues provided;Visual cues provided    Functional Measure:    Barthel Index:  Bathin  Bladder: 0  Bowels: 5  Groomin  Dressin  Feedin  Mobility: 0  Stairs: 0  Toilet Use: 0  Transfer (Bed to Chair and Back): 0  Total: 5/100      The Barthel ADL Index: Guidelines  1. The index should be used as a record of what a patient does, not as a record of what a patient could do. 2. The main aim is to establish degree of independence from any help, physical or verbal, however minor and for whatever reason. 3. The need for supervision renders the patient not independent. 4. A patient's performance should be established using the best available evidence. Asking the patient, friends/relatives and nurses are the usual sources, but direct observation and common sense are also important. However direct testing is not needed. 5. Usually the patient's performance over the preceding 24-48 hours is important, but occasionally longer periods will be relevant. 6. Middle categories imply that the patient supplies over 50 per cent of the effort. 7. Use of aids to be independent is allowed. Score Interpretation (from [address] [place] [address])    Independent   60-79 Minimally independent   40-59 Partially dependent   20-39 Very dependent   <20 Totally dependent     -Arleen Weber., Barthel, D.W. (1965). Functional evaluation: the Barthel Index. [address] ([address] (1997). The Barthel activities of daily living index: self-reporting versus actual performance in the old (> or = 75 years).  Journal of [address] [place] [address] 73(2), [facility], RAJWINDER, Arnulfo Castelan (1999). Measuring the change in disability after inpatient rehabilitation; comparison of the responsiveness of the Barthel Index and Functional [place] Measure. Journal of Neurology, Neurosurgery, and Psychiatry, 66(4), 709-497. CHAYA Pat, LISSETTE Blankenship, & Tamera Babb M.A. (2004) Assessment of post-stroke quality of life in cost-effectiveness studies: The usefulness of the Barthel Index and the EuroQoL-5D. Quality of Life Research, 15, 109-80     Occupational Therapy Evaluation Charge Determination   History Examination Decision-Making   LOW Complexity : Brief history review  MEDIUM Complexity : 3-5 performance deficits relating to physical, cognitive , or psychosocial skils that result in activity limitations and / or participation restrictions MEDIUM Complexity : Patient may present with comorbidities that affect occupational performnce. Miniml to moderate modification of tasks or assistance (eg, physical or verbal ) with assesment(s) is necessary to enable patient to complete evaluation       Based on the above components, the patient evaluation is determined to be of the following complexity level: MEDIUM  Pain Rating:  No complaints    Activity Tolerance:   Poor, SpO2 stable on RA, requires frequent rest breaks, and unable to lift arms, maintain balance in sitting without support, intermittently unable to hold head up    After treatment patient left in no apparent distress:    Supine in bed, Patient positioned in L sidelying for pressure relief, Call bell within reach, Caregiver / family present, and Side rails x 3    COMMUNICATION/EDUCATION:   The patients plan of care was discussed with: Physical therapist and Registered nurse. Home safety education was provided and the patient/caregiver indicated understanding., Patient/family have participated as able in goal setting and plan of care. , and Patient/family agree to work toward stated goals and plan of care. This patients plan of care is appropriate for delegation to JAKUB.     Thank you for this referral.  Lucrecia Christensen, OT  Time Calculation: 20 mins

## 2022-10-27 NOTE — PROGRESS NOTES
Problem:  Body Temperature -  Risk of, Imbalanced  Goal: Ability to maintain a body temperature within defined limits  Outcome: Progressing Towards Goal  Goal: Will regain or maintain usual level of consciousness  Outcome: Progressing Towards Goal  Goal: Complications related to the disease process, condition or treatment will be avoided or minimized  Outcome: Progressing Towards Goal     Problem: Isolation Precautions - Risk of Spread of Infection  Goal: Prevent transmission of infectious organism to others  Outcome: Progressing Towards Goal     Problem: Loneliness or Risk for Loneliness  Goal: Demonstrate positive use of time alone when socialization is not possible  Outcome: Progressing Towards Goal     Problem: Patient Education: Go to Patient Education Activity  Goal: Patient/Family Education  Outcome: Progressing Towards Goal     Problem: Patient Education: Go to Patient Education Activity  Goal: Patient/Family Education  Outcome: Progressing Towards Goal     Problem: Patient Education: Go to Patient Education Activity  Goal: Patient/Family Education  Outcome: Progressing Towards Goal

## 2022-10-28 ENCOUNTER — APPOINTMENT (OUTPATIENT)
Dept: GENERAL RADIOLOGY | Age: 87
DRG: 641 | End: 2022-10-28
Attending: INTERNAL MEDICINE
Payer: MEDICARE

## 2022-10-28 ENCOUNTER — HOME HEALTH ADMISSION (OUTPATIENT)
Dept: HOME HEALTH SERVICES | Facility: HOME HEALTH | Age: 87
End: 2022-10-28
Payer: MEDICARE

## 2022-10-28 LAB
25(OH)D3 SERPL-MCNC: 85.3 NG/ML (ref 30–100)
ANION GAP SERPL CALC-SCNC: 7 MMOL/L (ref 5–15)
ATRIAL RATE: 87 BPM
BASOPHILS # BLD: 0 K/UL (ref 0–0.1)
BASOPHILS NFR BLD: 0 % (ref 0–1)
BUN SERPL-MCNC: 22 MG/DL (ref 6–20)
BUN/CREAT SERPL: 13 (ref 12–20)
CALCIUM SERPL-MCNC: 9 MG/DL (ref 8.5–10.1)
CALCULATED P AXIS, ECG09: 35 DEGREES
CALCULATED R AXIS, ECG10: -21 DEGREES
CALCULATED T AXIS, ECG11: 6 DEGREES
CHLORIDE SERPL-SCNC: 103 MMOL/L (ref 97–108)
CO2 SERPL-SCNC: 28 MMOL/L (ref 21–32)
CREAT SERPL-MCNC: 1.7 MG/DL (ref 0.55–1.02)
DIAGNOSIS, 93000: NORMAL
DIFFERENTIAL METHOD BLD: ABNORMAL
EOSINOPHIL # BLD: 0.1 K/UL (ref 0–0.4)
EOSINOPHIL NFR BLD: 1 % (ref 0–7)
ERYTHROCYTE [DISTWIDTH] IN BLOOD BY AUTOMATED COUNT: 14.6 % (ref 11.5–14.5)
GLUCOSE SERPL-MCNC: 124 MG/DL (ref 65–100)
HCT VFR BLD AUTO: 32.7 % (ref 35–47)
HGB BLD-MCNC: 11 G/DL (ref 11.5–16)
IMM GRANULOCYTES # BLD AUTO: 0 K/UL (ref 0–0.04)
IMM GRANULOCYTES NFR BLD AUTO: 1 % (ref 0–0.5)
LACTATE SERPL-SCNC: 1.2 MMOL/L (ref 0.4–2)
LYMPHOCYTES # BLD: 0.8 K/UL (ref 0.8–3.5)
LYMPHOCYTES NFR BLD: 12 % (ref 12–49)
MAGNESIUM SERPL-MCNC: 2.7 MG/DL (ref 1.6–2.4)
MCH RBC QN AUTO: 27.6 PG (ref 26–34)
MCHC RBC AUTO-ENTMCNC: 33.6 G/DL (ref 30–36.5)
MCV RBC AUTO: 82.2 FL (ref 80–99)
MONOCYTES # BLD: 0.8 K/UL (ref 0–1)
MONOCYTES NFR BLD: 11 % (ref 5–13)
NEUTS SEG # BLD: 5.5 K/UL (ref 1.8–8)
NEUTS SEG NFR BLD: 75 % (ref 32–75)
NRBC # BLD: 0 K/UL (ref 0–0.01)
NRBC BLD-RTO: 0 PER 100 WBC
P-R INTERVAL, ECG05: 130 MS
PHOSPHATE SERPL-MCNC: 3.2 MG/DL (ref 2.6–4.7)
PLATELET # BLD AUTO: 233 K/UL (ref 150–400)
PMV BLD AUTO: 11.9 FL (ref 8.9–12.9)
POTASSIUM SERPL-SCNC: 4.1 MMOL/L (ref 3.5–5.1)
Q-T INTERVAL, ECG07: 376 MS
QRS DURATION, ECG06: 72 MS
QTC CALCULATION (BEZET), ECG08: 452 MS
RBC # BLD AUTO: 3.98 M/UL (ref 3.8–5.2)
SODIUM SERPL-SCNC: 138 MMOL/L (ref 136–145)
VENTRICULAR RATE, ECG03: 87 BPM
WBC # BLD AUTO: 7.2 K/UL (ref 3.6–11)

## 2022-10-28 PROCEDURE — 74011000250 HC RX REV CODE- 250: Performed by: STUDENT IN AN ORGANIZED HEALTH CARE EDUCATION/TRAINING PROGRAM

## 2022-10-28 PROCEDURE — 83605 ASSAY OF LACTIC ACID: CPT

## 2022-10-28 PROCEDURE — 93005 ELECTROCARDIOGRAM TRACING: CPT

## 2022-10-28 PROCEDURE — 74011250637 HC RX REV CODE- 250/637: Performed by: INTERNAL MEDICINE

## 2022-10-28 PROCEDURE — 36415 COLL VENOUS BLD VENIPUNCTURE: CPT

## 2022-10-28 PROCEDURE — 65270000029 HC RM PRIVATE

## 2022-10-28 PROCEDURE — 85025 COMPLETE CBC W/AUTO DIFF WBC: CPT

## 2022-10-28 PROCEDURE — 71045 X-RAY EXAM CHEST 1 VIEW: CPT

## 2022-10-28 PROCEDURE — 74011250636 HC RX REV CODE- 250/636: Performed by: INTERNAL MEDICINE

## 2022-10-28 PROCEDURE — 84100 ASSAY OF PHOSPHORUS: CPT

## 2022-10-28 PROCEDURE — 74011250637 HC RX REV CODE- 250/637: Performed by: STUDENT IN AN ORGANIZED HEALTH CARE EDUCATION/TRAINING PROGRAM

## 2022-10-28 PROCEDURE — 82306 VITAMIN D 25 HYDROXY: CPT

## 2022-10-28 PROCEDURE — 83735 ASSAY OF MAGNESIUM: CPT

## 2022-10-28 PROCEDURE — 97530 THERAPEUTIC ACTIVITIES: CPT

## 2022-10-28 PROCEDURE — 80048 BASIC METABOLIC PNL TOTAL CA: CPT

## 2022-10-28 RX ORDER — ALBUMIN HUMAN 50 G/1000ML
25 SOLUTION INTRAVENOUS ONCE
Status: DISCONTINUED | OUTPATIENT
Start: 2022-10-28 | End: 2022-10-28 | Stop reason: RX

## 2022-10-28 RX ORDER — ACETAMINOPHEN 325 MG/1
650 TABLET ORAL 3 TIMES DAILY
Status: DISCONTINUED | OUTPATIENT
Start: 2022-10-28 | End: 2022-10-30

## 2022-10-28 RX ORDER — MAG HYDROX/ALUMINUM HYD/SIMETH 200-200-20
30 SUSPENSION, ORAL (FINAL DOSE FORM) ORAL ONCE
Status: COMPLETED | OUTPATIENT
Start: 2022-10-28 | End: 2022-10-28

## 2022-10-28 RX ADMIN — ACETAMINOPHEN 650 MG: 325 TABLET ORAL at 21:43

## 2022-10-28 RX ADMIN — FAMOTIDINE 20 MG: 20 TABLET, FILM COATED ORAL at 08:40

## 2022-10-28 RX ADMIN — TAMSULOSIN HYDROCHLORIDE 0.4 MG: 0.4 CAPSULE ORAL at 08:40

## 2022-10-28 RX ADMIN — SODIUM CHLORIDE, SODIUM LACTATE, POTASSIUM CHLORIDE, CALCIUM CHLORIDE AND DEXTROSE MONOHYDRATE 50 ML/HR: 5; 600; 310; 30; 20 INJECTION, SOLUTION INTRAVENOUS at 20:48

## 2022-10-28 RX ADMIN — HEPARIN SODIUM 5000 UNITS: 5000 INJECTION INTRAVENOUS; SUBCUTANEOUS at 05:39

## 2022-10-28 RX ADMIN — ACETAMINOPHEN 650 MG: 325 TABLET ORAL at 16:06

## 2022-10-28 RX ADMIN — ACETAMINOPHEN 650 MG: 325 TABLET ORAL at 06:12

## 2022-10-28 RX ADMIN — DONEPEZIL HYDROCHLORIDE 5 MG: 5 TABLET, FILM COATED ORAL at 21:43

## 2022-10-28 RX ADMIN — DONEPEZIL HYDROCHLORIDE 5 MG: 5 TABLET, FILM COATED ORAL at 05:00

## 2022-10-28 RX ADMIN — ALUMINUM HYDROXIDE, MAGNESIUM HYDROXIDE, AND SIMETHICONE 30 ML: 200; 200; 20 SUSPENSION ORAL at 17:06

## 2022-10-28 RX ADMIN — SODIUM CHLORIDE, PRESERVATIVE FREE 10 ML: 5 INJECTION INTRAVENOUS at 15:34

## 2022-10-28 RX ADMIN — Medication: at 08:44

## 2022-10-28 RX ADMIN — SODIUM CHLORIDE, PRESERVATIVE FREE 10 ML: 5 INJECTION INTRAVENOUS at 05:39

## 2022-10-28 RX ADMIN — MIRTAZAPINE 30 MG: 15 TABLET, FILM COATED ORAL at 05:00

## 2022-10-28 RX ADMIN — HEPARIN SODIUM 5000 UNITS: 5000 INJECTION INTRAVENOUS; SUBCUTANEOUS at 15:33

## 2022-10-28 RX ADMIN — MIRTAZAPINE 30 MG: 15 TABLET, FILM COATED ORAL at 21:43

## 2022-10-28 RX ADMIN — POTASSIUM & SODIUM PHOSPHATES POWDER PACK 280-160-250 MG 1 PACKET: 280-160-250 PACK at 05:00

## 2022-10-28 RX ADMIN — SODIUM CHLORIDE, PRESERVATIVE FREE 10 ML: 5 INJECTION INTRAVENOUS at 21:42

## 2022-10-28 NOTE — PROGRESS NOTES
[facility]  Hospitalist Group                                                                                          Hospitalist Progress Note  Anil Garcia MD  Answering service: [phone] from in house phone        Date of Service:  10/28/2022  NAME:  Rohan Aparicio  :  3/15/1927  MRN:  [id number]      Admission Summary:   Rohan Aparicio is a 80 y.o. female Rohan Aparicio is a 80 y.o. female with past medical history of dementia with behavioral disturbance, GERD, hypertension, recurrent uti who presents to ER via EMS for concerns of fever, lethargy and pneumonia. Daughter at bedside states patient has been increasingly weak and lethargic over the last 48 hours. Family contacted dispatch health who evaluated patient at home. She was diagnosed with dehydration but was unable to receive IV fluids due to lack of IV access.   Furthermore family states chest x-ray obtained by dispatch health showed pneumonia and she was referred to hospital.     Remarkable vitals on ER Presentations: vss  Labs Remarkable for: mag 1.5, bnp 1440  ER Images: Chest x-ray showed mild interstitial edema pattern  ER treatment: NONE     Interval history / Subjective:   Required I&O cath x1 last night for retention 450cc  Reports slept well  Daughter at bedside and translates for pt  Pt more awake, alert today  C/o intermittent \"sharp\" chest pain, reproducible/tender to palpation  Ate a little bit yesterday  Low BP in 63T-64I systolic, MAP 66  NAD     Assessment & Plan:     Mild pulmonary edema  clinically improved s/p diuresis with Lasix 20 mg IV x1  hold further diuretics d/t risk of dehydration and low BP  Last echo 2021 EF 66%  Repeat echo 10/27/22 with EF 15-76%, abn diastolic function    Failure to thrive, anorexia  Likely r/t recent Covid infection 2 weeks ago, complicated by underlying dementia  Encourage PO intake  Already takes Remeron  Mild elev lactate, now cleared  Increase mIVF  TSH, B12, Vit D wnl    Hypotension  Poss IVVD- increase IVF as above  Give albumin x1  MAPs ok    Hypophos, repleted    AMY  S/p IV lasix x1  Increase IVF as above    Dementia  Continue home Remeron and Aricept      GERD  Home Pepcid      Code status: DNR  Prophylaxis: Heparin ppx  Care Plan discussed with: Patient/Family, Nurse, and   Anticipated Disposition: Home with HH/PT/OT C[facility], also has daily aide  Anticipated Discharge: 24 hours to 48 hours once energy improves, able to mobilize      Hospital Problems  Date Reviewed: 11/6/2019            Codes Class Noted POA    CHF (congestive heart failure) ([facility] ICD-10-CM: I50.9  ICD-9-CM: 428.0  10/26/2022 Unknown        Acute cystitis ICD-10-CM: N30.00  ICD-9-CM: 595.0  2/22/2021 Unknown         Review of Systems:   Pertinent items are noted in HPI    Vital Signs:    Last 24hrs VS reviewed since prior progress note. Most recent are:  Visit Vitals  BP (!) 84/57 (BP 1 Location: Right arm, BP Patient Position: At rest)   Pulse 99   Temp 98.3 °F (36.8 °C)   Resp 19   Ht 5' (1.524 m)   Wt 51 kg (112 lb 7 oz)   SpO2 92%   BMI 21.96 kg/m²         Intake/Output Summary (Last 24 hours) at 10/28/2022 1408  Last data filed at 10/27/2022 1845  Gross per 24 hour   Intake --   Output 550 ml   Net -550 ml          Physical Examination:   I had a face to face encounter with this patient and independently examined them on 10/28/2022 as outlined below:    General: Awake, alert, frail, no acute distress    EENT:  Anicteric sclerae. Dry MM  Resp:  CTA bilaterally, no wheezing or rales. No accessory muscle use  CV:  RRR, No audible murmur  Chest wall: TTP on left  GI:  Soft, Non distended, Non tender  Neurologic:  Awake, alert, resting comfortably, KIRKPATRICK  Extremities: No edema or cyanosis  Psych:   Not anxious or agitated  Skin:  No rashes.  No jaundice       Data Review:   I personally reviewed: vitals, labs, imaging results, notes    Labs:     Recent Labs     10/28/22  1386 10/26/22  1249   WBC 7.2 7.5   HGB 11.0* 11.4*   HCT 32.7* 34.2*    210       Recent Labs     10/28/22  0258 10/27/22  1250 10/26/22  1249    133* 136   K 4.1 3.6 4.2    102 104   CO2 28 22 23   BUN 22* 20 15   CREA 1.70* 1.83* 1.22*   * 195* 100   CA 9.0 9.1 9.2   MG 2.7* 2.7* 1.5*   PHOS 3.2 2.4*  --        Recent Labs     10/26/22  1249   ALT 16   AP 78   TBILI 1.2*   TP 7.8   ALB 2.8*   GLOB 5.0*       No results for input(s): INR, PTP, APTT, INREXT, INREXT in the last 72 hours. No results for input(s): FE, TIBC, PSAT, FERR in the last 72 hours. Lab Results   Component Value Date/Time    Folate 75.0 (H) 02/22/2021 07:06 PM        No results for input(s): PH, PCO2, PO2 in the last 72 hours. No results for input(s): CPK, CKNDX, TROIQ in the last 72 hours.     No lab exists for component: CPKMB  No results found for: CHOL, CHOLX, CHLST, CHOLV, HDL, HDLP, LDL, LDLC, DLDLP, TGLX, TRIGL, TRIGP, CHHD, CHHDX  No results found for: GLUCPOC  Lab Results   Component Value Date/Time    Color DARK YELLOW 10/26/2022 10:30 PM    Appearance CLEAR 10/26/2022 10:30 PM    Specific gravity 1.017 10/26/2022 10:30 PM    pH (UA) 5.5 10/26/2022 10:30 PM    Protein 100 (A) 10/26/2022 10:30 PM    Glucose Negative 10/26/2022 10:30 PM    Ketone 15 (A) 10/26/2022 10:30 PM    Bilirubin Negative 10/26/2022 10:30 PM    Urobilinogen 0.2 10/26/2022 10:30 PM    Nitrites Negative 10/26/2022 10:30 PM    Leukocyte Esterase TRACE (A) 10/26/2022 10:30 PM    Epithelial cells FEW 10/26/2022 10:30 PM    Bacteria Negative 10/26/2022 10:30 PM    WBC 10-20 10/26/2022 10:30 PM    RBC 0-5 10/26/2022 10:30 PM       Medications Reviewed:     Current Facility-Administered Medications   Medication Dose Route Frequency    sodium chloride (NS) flush 5-40 mL  5-40 mL IntraVENous Q8H    sodium chloride (NS) flush 5-40 mL  5-40 mL IntraVENous PRN    acetaminophen (TYLENOL) tablet 650 mg  650 mg Oral Q6H PRN    Or    acetaminophen (TYLENOL) suppository 650 mg  650 mg Rectal Q6H PRN    polyethylene glycol (MIRALAX) packet 17 g  17 g Oral DAILY PRN    ondansetron (ZOFRAN ODT) tablet 4 mg  4 mg Oral Q8H PRN    Or    ondansetron (ZOFRAN) injection 4 mg  4 mg IntraVENous Q6H PRN    donepeziL (ARICEPT) tablet 5 mg  5 mg Oral QHS    mirtazapine (REMERON) tablet 30 mg  30 mg Oral QHS    famotidine (PEPCID) tablet 20 mg  20 mg Oral DAILY    ammonium lactate (LAC-HYDRIN) 12 % lotion   Topical BID    heparin (porcine) injection 5,000 Units  5,000 Units SubCUTAneous Q12H    dextrose 5% lactated ringers infusion  75 mL/hr IntraVENous CONTINUOUS    tamsulosin (FLOMAX) capsule 0.4 mg  0.4 mg Oral DAILY    sodium chloride (NS) flush 5-10 mL  5-10 mL IntraVENous PRN     ______________________________________________________________________  EXPECTED LENGTH OF STAY: 2d 14h  ACTUAL LENGTH OF STAY:          2                 Comfort Mcdaniel MD

## 2022-10-28 NOTE — PROGRESS NOTES
Transition of Care Plan  RUR: 12%  DISPOSITION: The disposition plan is home with [facility] [address]  F/U with PCP/Specialist    Transport: family      Referral for [facility] sent. [address] has accepted patient back for care.     2:34 PM  EVE Maya

## 2022-10-28 NOTE — PROGRESS NOTES
Problem: Self Care Deficits Care Plan (Adult)  Goal: *Acute Goals and Plan of Care (Insert Text)  Description: FUNCTIONAL STATUS PRIOR TO ADMISSION: Patient was recently admitted to hospital and was discharged 10/18. Per therapy notes she was walking SBA with RW. Per family since returning home patient had been unable to participate in any of her self care and they were pushing her on her rollator seat for mobility. HOME SUPPORT: The patient lived with daughter and son-in-law and had a full time caregiver she required assistance with all ADLs; she was requiring total A for toileting and bathing and up to Max A for dressing, feeding and grooming    Occupational Therapy Goals  Initiated 10/27/2022  1. Patient will perform self-feeding with minimal assistance/contact guard assist within 7 day(s). 2.  Patient will perform upper body dressing with moderate assistance  within 7 day(s). 3.  Patient will perform SPT to recliner chair or BSC with moderate assistance  within 7 day(s). 4.  Patient will participate in upper extremity therapeutic exercise/activities with minimal assistance/contact guard assist for 10 minutes within 7 day(s). Outcome: Progressing Towards Goal   OCCUPATIONAL THERAPY TREATMENT  Patient: Rex Aguirre (77 y.o. female)  Date: 10/28/2022  Diagnosis: CHF (congestive heart failure) ([facility]) [I50.9]  Acute cystitis [N30.00] <principal problem not specified>      Precautions:    Chart, occupational therapy assessment, plan of care, and goals were reviewed. ASSESSMENT  Patient continues with skilled OT services and is slowly progressing towards goals. Patient received with RN at bedside, RN requesting assistance for positioning to place IV line. Patient perseverated throughout session on the needle after this attempt, she moved better today than during evaluation with this writer but unable to maintain upright sitting 2/2 syncopal symptoms and complaints.  Patient notably more alert and able to hold head up and scoot with minima assistance today. Assisted back to bed at end of session, unable to obtain BP 2/2 inability for patient to tolerate BP cuff RN aware, patient alert and responding to daughter in NAD. Will continue to follow to trial patient's tolerance for therapy. Current Level of Function Impacting Discharge (ADLs): Max A x1 bed mobility    Other factors to consider for discharge: has good family support, family wishes to take patient home at discharge         PLAN :  Patient continues to benefit from skilled intervention to address the above impairments. Continue treatment per established plan of care to address goals. Recommend with staff: position for comfort, meal assistance if family not in room    Recommend next OT session: progress sitting tolerance as vitals allow    Recommendation for discharge: (in order for the patient to meet his/her long term goals)  Occupational therapy at least 2 days/week in the home     This discharge recommendation:  Has been made in collaboration with the attending provider and/or case management    IF patient discharges home will need the following DME: patient owns DME required for discharge       SUBJECTIVE:   Patient stated Needles, needles, needles.  translated by daughter after RN attempt to place IV patient perseverating on this throughout therapy today    OBJECTIVE DATA SUMMARY:   Cognitive/Behavioral Status:  Neurologic State: Alert;Confused  Orientation Level: Disoriented X4  Cognition: Impaired decision making;Memory loss; Follows commands  Perception: Cues to attend left visual field;Cues to attend right visual field;Cues to attend to left side of body;Cues to attend to right side of body  Perseveration: Perseverates during conversation;Perseverates during ADLS;Perseverates during mobility  Safety/Judgement: Decreased insight into deficits; Decreased awareness of environment;Decreased awareness of need for assistance;Decreased awareness of need for safety    Functional Mobility and Transfers for ADLs:  Bed Mobility:  Rolling: Maximum assistance;Assist x1;Additional time  Supine to Sit: Maximum assistance;Assist x1;Additional time  Sit to Supine: Maximum assistance; Additional time;Assist x1  Scooting: Minimum assistance    ADL Intervention:     Cognitive Retraining  Safety/Judgement: Decreased insight into deficits; Decreased awareness of environment;Decreased awareness of need for assistance;Decreased awareness of need for safety    Pain:  Unable to give numeric score, patient calls out in pain with all touch after RN attempting to place IV    Activity Tolerance:   Poor, requires frequent rest breaks, and signs and symptoms of orthostatic hypotension    After treatment patient left in no apparent distress:   Supine in bed, Call bell within reach, Bed / chair alarm activated, Caregiver / family present, and Side rails x 3    COMMUNICATION/COLLABORATION:   The patients plan of care was discussed with: Registered nurse.      Sandeep Soliz OT  Time Calculation: 27 mins

## 2022-10-29 LAB
ALBUMIN SERPL-MCNC: 2.2 G/DL (ref 3.5–5)
ANION GAP SERPL CALC-SCNC: 7 MMOL/L (ref 5–15)
BNP SERPL-MCNC: 561 PG/ML
BUN SERPL-MCNC: 22 MG/DL (ref 6–20)
BUN/CREAT SERPL: 14 (ref 12–20)
CALCIUM SERPL-MCNC: 8.7 MG/DL (ref 8.5–10.1)
CHLORIDE SERPL-SCNC: 105 MMOL/L (ref 97–108)
CO2 SERPL-SCNC: 28 MMOL/L (ref 21–32)
CREAT SERPL-MCNC: 1.54 MG/DL (ref 0.55–1.02)
GLUCOSE SERPL-MCNC: 100 MG/DL (ref 65–100)
MAGNESIUM SERPL-MCNC: 2.2 MG/DL (ref 1.6–2.4)
PHOSPHATE SERPL-MCNC: 2.8 MG/DL (ref 2.6–4.7)
POTASSIUM SERPL-SCNC: 4 MMOL/L (ref 3.5–5.1)
SODIUM SERPL-SCNC: 140 MMOL/L (ref 136–145)
TROPONIN-HIGH SENSITIVITY: 14 NG/L (ref 0–51)

## 2022-10-29 PROCEDURE — 74011000250 HC RX REV CODE- 250: Performed by: STUDENT IN AN ORGANIZED HEALTH CARE EDUCATION/TRAINING PROGRAM

## 2022-10-29 PROCEDURE — 80048 BASIC METABOLIC PNL TOTAL CA: CPT

## 2022-10-29 PROCEDURE — 84484 ASSAY OF TROPONIN QUANT: CPT

## 2022-10-29 PROCEDURE — 83880 ASSAY OF NATRIURETIC PEPTIDE: CPT

## 2022-10-29 PROCEDURE — 74011000250 HC RX REV CODE- 250: Performed by: INTERNAL MEDICINE

## 2022-10-29 PROCEDURE — 74011250636 HC RX REV CODE- 250/636: Performed by: STUDENT IN AN ORGANIZED HEALTH CARE EDUCATION/TRAINING PROGRAM

## 2022-10-29 PROCEDURE — 51798 US URINE CAPACITY MEASURE: CPT

## 2022-10-29 PROCEDURE — 82040 ASSAY OF SERUM ALBUMIN: CPT

## 2022-10-29 PROCEDURE — 74011250637 HC RX REV CODE- 250/637: Performed by: STUDENT IN AN ORGANIZED HEALTH CARE EDUCATION/TRAINING PROGRAM

## 2022-10-29 PROCEDURE — 74011250636 HC RX REV CODE- 250/636: Performed by: INTERNAL MEDICINE

## 2022-10-29 PROCEDURE — 36415 COLL VENOUS BLD VENIPUNCTURE: CPT

## 2022-10-29 PROCEDURE — 83735 ASSAY OF MAGNESIUM: CPT

## 2022-10-29 PROCEDURE — 74011250637 HC RX REV CODE- 250/637: Performed by: INTERNAL MEDICINE

## 2022-10-29 PROCEDURE — 84100 ASSAY OF PHOSPHORUS: CPT

## 2022-10-29 PROCEDURE — 93005 ELECTROCARDIOGRAM TRACING: CPT

## 2022-10-29 PROCEDURE — 65270000029 HC RM PRIVATE

## 2022-10-29 RX ORDER — NITROFURANTOIN 25; 75 MG/1; MG/1
100 CAPSULE ORAL 2 TIMES DAILY
Status: DISCONTINUED | OUTPATIENT
Start: 2022-10-29 | End: 2022-10-29

## 2022-10-29 RX ADMIN — SODIUM CHLORIDE, PRESERVATIVE FREE 10 ML: 5 INJECTION INTRAVENOUS at 06:00

## 2022-10-29 RX ADMIN — MIRTAZAPINE 30 MG: 15 TABLET, FILM COATED ORAL at 22:05

## 2022-10-29 RX ADMIN — Medication: at 17:42

## 2022-10-29 RX ADMIN — SODIUM CHLORIDE, PRESERVATIVE FREE 5 ML: 5 INJECTION INTRAVENOUS at 16:01

## 2022-10-29 RX ADMIN — ACETAMINOPHEN 650 MG: 325 TABLET ORAL at 22:04

## 2022-10-29 RX ADMIN — HEPARIN SODIUM 5000 UNITS: 5000 INJECTION INTRAVENOUS; SUBCUTANEOUS at 03:14

## 2022-10-29 RX ADMIN — SODIUM CHLORIDE, SODIUM LACTATE, POTASSIUM CHLORIDE, CALCIUM CHLORIDE AND DEXTROSE MONOHYDRATE 50 ML/HR: 5; 600; 310; 30; 20 INJECTION, SOLUTION INTRAVENOUS at 23:50

## 2022-10-29 RX ADMIN — DONEPEZIL HYDROCHLORIDE 5 MG: 5 TABLET, FILM COATED ORAL at 22:05

## 2022-10-29 RX ADMIN — ONDANSETRON 4 MG: 2 INJECTION INTRAMUSCULAR; INTRAVENOUS at 10:22

## 2022-10-29 RX ADMIN — SODIUM CHLORIDE 1 G: 9 INJECTION INTRAMUSCULAR; INTRAVENOUS; SUBCUTANEOUS at 15:52

## 2022-10-29 RX ADMIN — SODIUM CHLORIDE, PRESERVATIVE FREE 10 ML: 5 INJECTION INTRAVENOUS at 22:05

## 2022-10-29 RX ADMIN — FAMOTIDINE 20 MG: 20 TABLET, FILM COATED ORAL at 09:45

## 2022-10-29 NOTE — PROGRESS NOTES
0930 Pt educated on IS with daughter at bedside. Pt c/o pain in chest when using IS. Tylenol offered but pt refusing. Pt would benefit from continued education. Will continue to monitor. 1600 Pt refusing all care at this time including refusing all medications and alexander/incontinence care. Pt educated on importance of incontinence care but still refuses. Pt refusing VSS stating, \"I am asleep\" and this RN has woken her up. Will pass along in report.

## 2022-10-29 NOTE — PROGRESS NOTES
Problem: Pressure Injury - Risk of  Goal: *Prevention of pressure injury  Description: Document Zev Scale and appropriate interventions in the flowsheet.   Outcome: Progressing Towards Goal  Note: Pressure Injury Interventions:  Sensory Interventions: Assess changes in LOC, Check visual cues for pain    Moisture Interventions: Absorbent underpads, Apply protective barrier, creams and emollients    Activity Interventions: Pressure redistribution bed/mattress(bed type), PT/OT evaluation    Mobility Interventions: HOB 30 degrees or less    Nutrition Interventions: Discuss nutritional consult with provider    Friction and Shear Interventions: Apply protective barrier, creams and emollients, Feet elevated on foot rest

## 2022-10-29 NOTE — PROGRESS NOTES
[address] [facility] Adult  Hospitalist Group                                                                                          Hospitalist Progress Note  Alexsandra Bingham MD  Answering service: [phone] from in house phone        Date of Service:  10/29/2022  NAME:  Norberto Corona  :  3/15/1927  MRN:  [id number]      Admission Summary:   Norberto Corona is a 80 y.o. female Norberto Corona is a 80 y.o. female with past medical history of dementia with behavioral disturbance, GERD, hypertension, recurrent uti who presents to ER via EMS for concerns of fever, lethargy and pneumonia. Daughter at bedside states patient has been increasingly weak and lethargic over the last 48 hours. Family contacted dispatch [facility] who evaluated patient at home. She was diagnosed with dehydration but was unable to receive IV fluids due to lack of IV access.   Furthermore family states chest x-ray obtained by dispatch health showed pneumonia and she was referred to hospital.     Remarkable vitals on ER Presentations: vss  Labs Remarkable for: mag 1.5, bnp 1440  ER Images: Chest x-ray showed mild interstitial edema pattern  ER treatment: NONE     Interval history / Subjective:   Retention resolved, pt now urinating on own  Reports slept well  Daughter and grandson at bedside  Pt more awake, alert today, got up to commode with assistance  Still c/o frequent intermittent chest pain that feels like \"needles\" and sob  Chest tender to palpation and pain worse with deep breath  Ate very little yesterday  Family reports pt c/o dysuria  NAD     Assessment & Plan:     Mild pulmonary edema  clinically improved s/p diuresis with Lasix 20 mg IV x1  BNP improved  Repeat CXR shows small left effusion but will hold further diuretics d/t risk of dehydration and soft BP in setting of poor PO intake, family understands and agrees  Last echo 2021 EF 66%  Repeat echo 10/27/22 with EF 49-01%, abn diastolic function    Failure to thrive, anorexia  Likely r/t recent Covid infection 2 weeks ago, complicated by underlying dementia  Encourage PO intake  Already takes Remeron  Mild elev lactate, now cleared  Cont gentle mIVF  TSH, B12, Vit D wnl    Hypotension, slightly improving  Likely IVVD- cont mIVF as above  MAPs ok  Check orthostats    Hypophos, repleted    AMY, improving  S/p IV lasix x1  Cont mIVF as above    Pleuritic chest pain  Poss from small pleural effusion vs musculoskeletal  EKG x2 without acute changes  Troponin wnl  IS  Tylenol    Dysuria  start Rocephin (allergy to pcn and sulfa; macrobid contraindicated with renal function)    Dementia  Continue home Remeron and Aricept      GERD  Home Pepcid      Code status: DNR  Prophylaxis: Heparin ppx  Care Plan discussed with: Patient/Family and Nurse  Anticipated Disposition: Home with HH/PT/OT [facility], also has daily aide  Anticipated Discharge: 24 hours to 48 hours once energy improves, able to mobilize     Hospital Problems  Date Reviewed: 11/6/2019            Codes Class Noted POA    CHF (congestive heart failure) ([facility]) ICD-10-CM: I50.9  ICD-9-CM: 428.0  10/26/2022 Unknown        Acute cystitis ICD-10-CM: N30.00  ICD-9-CM: 595.0  2/22/2021 Unknown         Review of Systems:   Pertinent items are noted in HPI    Vital Signs:    Last 24hrs VS reviewed since prior progress note. Most recent are:  Visit Vitals  /67   Pulse 87   Temp 97.7 °F (36.5 °C)   Resp 16   Ht 5' (1.524 m)   Wt 51 kg (112 lb 7 oz)   SpO2 94%   BMI 21.96 kg/m²       No intake or output data in the 24 hours ending 10/29/22 1213       Physical Examination:   I had a face to face encounter with this patient and independently examined them on 10/29/2022 as outlined below:    General: Awake, alert, frail, weak-appearing, no acute distress    EENT:  Anicteric sclerae.  Dry MM  Resp:  Mild rales left base, no wheezing or accessory muscle use  CV:  RRR, No audible murmur  Chest wall: TTP, back kyphosis  GI:  Soft, Non distended, Non tender  Neurologic:  Awake, alert, resting comfortably, KIRKPATRICK  Extremities: No edema or cyanosis  Psych:   Not anxious or agitated  Skin:  No rashes. No jaundice       Data Review:   I personally reviewed: vitals, labs, imaging results, notes    Labs:     Recent Labs     10/28/22  0258 10/26/22  1249   WBC 7.2 7.5   HGB 11.0* 11.4*   HCT 32.7* 34.2*    210       Recent Labs     10/29/22  0301 10/28/22  0258 10/27/22  1250    138 133*   K 4.0 4.1 3.6    103 102   CO2 28 28 22   BUN 22* 22* 20   CREA 1.54* 1.70* 1.83*    124* 195*   CA 8.7 9.0 9.1   MG 2.2 2.7* 2.7*   PHOS 2.8 3.2 2.4*       Recent Labs     10/29/22  0301 10/26/22  1249   ALT  --  16   AP  --  78   TBILI  --  1.2*   TP  --  7.8   ALB 2.2* 2.8*   GLOB  --  5.0*       No results for input(s): INR, PTP, APTT, INREXT, INREXT in the last 72 hours. No results for input(s): FE, TIBC, PSAT, FERR in the last 72 hours. Lab Results   Component Value Date/Time    Folate 75.0 (H) 02/22/2021 07:06 PM        No results for input(s): PH, PCO2, PO2 in the last 72 hours. No results for input(s): CPK, CKNDX, TROIQ in the last 72 hours.     No lab exists for component: CPKMB  No results found for: CHOL, CHOLX, CHLST, CHOLV, HDL, HDLP, LDL, LDLC, DLDLP, TGLX, TRIGL, TRIGP, CHHD, CHHDX  No results found for: GLUCPOC  Lab Results   Component Value Date/Time    Color DARK YELLOW 10/26/2022 10:30 PM    Appearance CLEAR 10/26/2022 10:30 PM    Specific gravity 1.017 10/26/2022 10:30 PM    pH (UA) 5.5 10/26/2022 10:30 PM    Protein 100 (A) 10/26/2022 10:30 PM    Glucose Negative 10/26/2022 10:30 PM    Ketone 15 (A) 10/26/2022 10:30 PM    Bilirubin Negative 10/26/2022 10:30 PM    Urobilinogen 0.2 10/26/2022 10:30 PM    Nitrites Negative 10/26/2022 10:30 PM    Leukocyte Esterase TRACE (A) 10/26/2022 10:30 PM    Epithelial cells FEW 10/26/2022 10:30 PM    Bacteria Negative 10/26/2022 10:30 PM    WBC 10-20 10/26/2022 10:30 PM    RBC 0-5 10/26/2022 10:30 PM       Medications Reviewed:     Current Facility-Administered Medications   Medication Dose Route Frequency    cefTRIAXone (ROCEPHIN) 1 g in 0.9% sodium chloride 10 mL IV syringe  1 g IntraVENous Q24H    acetaminophen (TYLENOL) tablet 650 mg  650 mg Oral TID    sodium chloride (NS) flush 5-40 mL  5-40 mL IntraVENous Q8H    sodium chloride (NS) flush 5-40 mL  5-40 mL IntraVENous PRN    acetaminophen (TYLENOL) tablet 650 mg  650 mg Oral Q6H PRN    Or    acetaminophen (TYLENOL) suppository 650 mg  650 mg Rectal Q6H PRN    polyethylene glycol (MIRALAX) packet 17 g  17 g Oral DAILY PRN    ondansetron (ZOFRAN ODT) tablet 4 mg  4 mg Oral Q8H PRN    Or    ondansetron (ZOFRAN) injection 4 mg  4 mg IntraVENous Q6H PRN    donepeziL (ARICEPT) tablet 5 mg  5 mg Oral QHS    mirtazapine (REMERON) tablet 30 mg  30 mg Oral QHS    famotidine (PEPCID) tablet 20 mg  20 mg Oral DAILY    ammonium lactate (LAC-HYDRIN) 12 % lotion   Topical BID    heparin (porcine) injection 5,000 Units  5,000 Units SubCUTAneous Q12H    dextrose 5% lactated ringers infusion  50 mL/hr IntraVENous CONTINUOUS    sodium chloride (NS) flush 5-10 mL  5-10 mL IntraVENous PRN     ______________________________________________________________________  EXPECTED LENGTH OF STAY: 2d 14h  ACTUAL LENGTH OF STAY:          3                 Landon Ceballos MD

## 2022-10-30 LAB
ATRIAL RATE: 111 BPM
CALCULATED P AXIS, ECG09: 20 DEGREES
CALCULATED R AXIS, ECG10: -25 DEGREES
CALCULATED T AXIS, ECG11: 4 DEGREES
DIAGNOSIS, 93000: NORMAL
P-R INTERVAL, ECG05: 116 MS
Q-T INTERVAL, ECG07: 324 MS
QRS DURATION, ECG06: 66 MS
QTC CALCULATION (BEZET), ECG08: 440 MS
VENTRICULAR RATE, ECG03: 111 BPM

## 2022-10-30 PROCEDURE — 74011250637 HC RX REV CODE- 250/637: Performed by: INTERNAL MEDICINE

## 2022-10-30 PROCEDURE — 74011250637 HC RX REV CODE- 250/637: Performed by: STUDENT IN AN ORGANIZED HEALTH CARE EDUCATION/TRAINING PROGRAM

## 2022-10-30 PROCEDURE — 74011000250 HC RX REV CODE- 250: Performed by: STUDENT IN AN ORGANIZED HEALTH CARE EDUCATION/TRAINING PROGRAM

## 2022-10-30 PROCEDURE — 65270000029 HC RM PRIVATE

## 2022-10-30 PROCEDURE — 74011250636 HC RX REV CODE- 250/636: Performed by: INTERNAL MEDICINE

## 2022-10-30 RX ORDER — TRAMADOL HYDROCHLORIDE 50 MG/1
50 TABLET ORAL
Status: COMPLETED | OUTPATIENT
Start: 2022-10-30 | End: 2022-10-30

## 2022-10-30 RX ORDER — TRAMADOL HYDROCHLORIDE 50 MG/1
50 TABLET ORAL
Status: DISCONTINUED | OUTPATIENT
Start: 2022-10-30 | End: 2022-11-01 | Stop reason: HOSPADM

## 2022-10-30 RX ORDER — CEFDINIR 250 MG/5ML
300 POWDER, FOR SUSPENSION ORAL DAILY
Status: DISCONTINUED | OUTPATIENT
Start: 2022-10-30 | End: 2022-11-01 | Stop reason: HOSPADM

## 2022-10-30 RX ADMIN — SODIUM CHLORIDE, PRESERVATIVE FREE 10 ML: 5 INJECTION INTRAVENOUS at 13:31

## 2022-10-30 RX ADMIN — Medication: at 09:28

## 2022-10-30 RX ADMIN — TRAMADOL HYDROCHLORIDE 50 MG: 50 TABLET, COATED ORAL at 09:28

## 2022-10-30 RX ADMIN — HEPARIN SODIUM 5000 UNITS: 5000 INJECTION INTRAVENOUS; SUBCUTANEOUS at 02:53

## 2022-10-30 RX ADMIN — FAMOTIDINE 20 MG: 20 TABLET, FILM COATED ORAL at 09:28

## 2022-10-30 RX ADMIN — CEFDINIR 300 MG: 250 POWDER, FOR SUSPENSION ORAL at 13:31

## 2022-10-30 RX ADMIN — SODIUM CHLORIDE, PRESERVATIVE FREE 10 ML: 5 INJECTION INTRAVENOUS at 06:00

## 2022-10-30 NOTE — PROGRESS NOTES
[address] [facility] Adult  Hospitalist Group                                                                                          Hospitalist Progress Note  Arely Thomas MD  Answering service: [phone] from in house phone        Date of Service:  10/30/2022  NAME:  Roger Gomez  :  3/15/1927  MRN:  [id number]      Admission Summary:   Roger Gomez is a 80 y.o. female Roger Gomez is a 80 y.o. female with past medical history of dementia with behavioral disturbance, GERD, hypertension, recurrent uti who presents to ER via EMS for concerns of fever, lethargy and pneumonia. Daughter at bedside states patient has been increasingly weak and lethargic over the last 48 hours. Family contacted dispatch health who evaluated patient at home. She was diagnosed with dehydration but was unable to receive IV fluids due to lack of IV access.   Furthermore family states chest x-ray obtained by dispatch health showed pneumonia and she was referred to hospital.     Remarkable vitals on ER Presentations: vss  Labs Remarkable for: mag 1.5, bnp 1440  ER Images: Chest x-ray showed mild interstitial edema pattern  ER treatment: NONE     Interval history / Subjective:   Reports slept well  Daughter and son--in-law at bedside  Pt more sleepy, resting today  Still c/o frequent intermittent chest pain that feels like \"needles\"  Also \"body aches\" and sore everywhere  Chest tender to palpation and pain worse with deep breath  Ate a little more yesterday compared to the day before, but did not drink anything  Family states pt refused one meal because of how bothersome chest pain  Family reports pt c/o dysuria  IV infiltrated last night, pt refused new IV and refused labs this AM, also refusing Tylenol     Assessment & Plan:     Mild pulmonary edema  Clinically improved s/p diuresis with Lasix 20 mg IV x1  BNP improved  Repeat CXR shows small left effusion but holding further diuretics d/t risk of dehydration and soft BP in setting of poor PO intake, family understands and agrees  Last echo 2/2021 EF 66%  Repeat echo 10/27/22 with EF 07-07%, abn diastolic function    Failure to thrive, anorexia, generalized weakness  Likely r/t recent Covid infection 2 weeks ago, complicated by underlying dementia  Supportive care  Encourage PO intake  Already takes Remeron  Mild elev lactate, now cleared  Gentle mIVF, now stopped d/t no IV  TSH, B12, Vit D wnl  PT/OT  Reviewed likelihood of overall decline and end-of-life care sb if PO intake does not improve  Discussed option of going home with hospice services, they will think about it    Pleuritic chest pain, generalized body aches  Likely musculoskeletal vs postviral (covid)  EKG x2 without acute changes  Troponin wnl  IS as able, limited d/t pain  Tylenol (when willing to take)  Discussed with family at bedside re: try to better treat the pain to see if she will then be more comfortable to eat, interact, etc; they agreed, will try one dose of Tramadol 50mg and monitor  Risks of increased sedation or AMS from Tramadol were reviewed    Hypotension, improving  S/p IVF as above    Hypophos, repleted    AMY, improving  S/p IV lasix x1  Cont mIVF as above    Dysuria  on Rocephin (allergy to pcn and sulfa; macrobid contraindicated with renal function)  Change to cefdinir since no IV and cannot get IM Rocephin d/t lidocaine allergy    Dementia  Continue home Remeron and Aricept      GERD  Home Pepcid    Overall poor prognosis, consider hospice eval soon      Code status: DNR  Prophylaxis: Heparin ppx  Care Plan discussed with: Patient/Family and Nurse  Anticipated Disposition: Home with HH/PT/OT [facility], also has daily aide; vs home hospice  Anticipated Discharge: 24 hours to 48 hours pending clinical status     Hospital Problems  Date Reviewed: 11/6/2019            Codes Class Noted POA    CHF (congestive heart failure) ([facility] [address] ICD-10-CM: I50.9  ICD-9-CM: 428.0  10/26/2022 Unknown Acute cystitis ICD-10-CM: N30.00  ICD-9-CM: 595.0  2/22/2021 Unknown       Review of Systems:   Pertinent items are noted in HPI    Vital Signs:    Last 24hrs VS reviewed since prior progress note. Most recent are:  Visit Vitals  /74 (BP 1 Location: Left upper arm, BP Patient Position: At rest)   Pulse 81   Temp 98 °F (36.7 °C)   Resp 16   Ht 5' (1.524 m)   Wt 51 kg (112 lb 7 oz)   SpO2 92%   BMI 21.96 kg/m²       No intake or output data in the 24 hours ending 10/30/22 1056       Physical Examination:   I had a face to face encounter with this patient and independently examined them on 10/30/2022 as outlined below:    General: Sleepy, frail, weak-appearing, no acute distress    EENT:  Anicteric sclerae. Dry MM  Resp:  Decreased at bases, no wheezing or accessory muscle use  CV:  RRR, No audible murmur  Chest wall: TTP, back kyphosis  GI:  Soft, Non distended, Non tender  Neurologic:  Drowsy but awake, resting comfortably, KIRKPATRICK  Extremities: No edema or cyanosis  Psych:   Not anxious or agitated  Skin:  No rashes. No jaundice       Data Review:   I personally reviewed: vitals, labs, imaging results, notes    Labs:     Recent Labs     10/28/22  0258   WBC 7.2   HGB 11.0*   HCT 32.7*          Recent Labs     10/29/22  0301 10/28/22  0258 10/27/22  1250    138 133*   K 4.0 4.1 3.6    103 102   CO2 28 28 22   BUN 22* 22* 20   CREA 1.54* 1.70* 1.83*    124* 195*   CA 8.7 9.0 9.1   MG 2.2 2.7* 2.7*   PHOS 2.8 3.2 2.4*       Recent Labs     10/29/22  0301   ALB 2.2*       No results for input(s): INR, PTP, APTT, INREXT, INREXT in the last 72 hours. No results for input(s): FE, TIBC, PSAT, FERR in the last 72 hours. Lab Results   Component Value Date/Time    Folate 75.0 (H) 02/22/2021 07:06 PM        No results for input(s): PH, PCO2, PO2 in the last 72 hours. No results for input(s): CPK, CKNDX, TROIQ in the last 72 hours.     No lab exists for component: CPKMB  No results found for: CHOL, CHOLX, CHLST, CHOLV, HDL, HDLP, LDL, LDLC, DLDLP, TGLX, TRIGL, TRIGP, CHHD, CHHDX  No results found for: GLUCPOC  Lab Results   Component Value Date/Time    Color DARK YELLOW 10/26/2022 10:30 PM    Appearance CLEAR 10/26/2022 10:30 PM    Specific gravity 1.017 10/26/2022 10:30 PM    pH (UA) 5.5 10/26/2022 10:30 PM    Protein 100 (A) 10/26/2022 10:30 PM    Glucose Negative 10/26/2022 10:30 PM    Ketone 15 (A) 10/26/2022 10:30 PM    Bilirubin Negative 10/26/2022 10:30 PM    Urobilinogen 0.2 10/26/2022 10:30 PM    Nitrites Negative 10/26/2022 10:30 PM    Leukocyte Esterase TRACE (A) 10/26/2022 10:30 PM    Epithelial cells FEW 10/26/2022 10:30 PM    Bacteria Negative 10/26/2022 10:30 PM    WBC 10-20 10/26/2022 10:30 PM    RBC 0-5 10/26/2022 10:30 PM       Medications Reviewed:     Current Facility-Administered Medications   Medication Dose Route Frequency    cefTRIAXone (ROCEPHIN) 1 g in 0.9% sodium chloride 10 mL IV syringe  1 g IntraVENous Q24H    acetaminophen (TYLENOL) tablet 650 mg  650 mg Oral TID    sodium chloride (NS) flush 5-40 mL  5-40 mL IntraVENous Q8H    sodium chloride (NS) flush 5-40 mL  5-40 mL IntraVENous PRN    acetaminophen (TYLENOL) tablet 650 mg  650 mg Oral Q6H PRN    Or    acetaminophen (TYLENOL) suppository 650 mg  650 mg Rectal Q6H PRN    polyethylene glycol (MIRALAX) packet 17 g  17 g Oral DAILY PRN    ondansetron (ZOFRAN ODT) tablet 4 mg  4 mg Oral Q8H PRN    Or    ondansetron (ZOFRAN) injection 4 mg  4 mg IntraVENous Q6H PRN    donepeziL (ARICEPT) tablet 5 mg  5 mg Oral QHS    mirtazapine (REMERON) tablet 30 mg  30 mg Oral QHS    famotidine (PEPCID) tablet 20 mg  20 mg Oral DAILY    ammonium lactate (LAC-HYDRIN) 12 % lotion   Topical BID    heparin (porcine) injection 5,000 Units  5,000 Units SubCUTAneous Q12H    dextrose 5% lactated ringers infusion  50 mL/hr IntraVENous CONTINUOUS    sodium chloride (NS) flush 5-10 mL  5-10 mL IntraVENous PRN ______________________________________________________________________  EXPECTED LENGTH OF STAY: 2d 14h  ACTUAL LENGTH OF STAY:          Joe Dejesus MD

## 2022-10-30 NOTE — PROGRESS NOTES
Problem: Nutrition Deficits  Goal: Optimize nutrtional status  Outcome: Not Progressing Towards Goal

## 2022-10-30 NOTE — PROGRESS NOTES
Pt IV infiltrated. First IV attempt unsuccessful. Pt refusing second IV attempt and labs. Provider notified.

## 2022-10-30 NOTE — PROGRESS NOTES
Bedside and Verbal shift change report given to [address] Betty Claudio  (oncoming nurse) by Star Porter  (offgoing nurse). Report included the following information SBAR.

## 2022-10-31 ENCOUNTER — HOSPICE ADMISSION (OUTPATIENT)
Dept: HOSPICE | Facility: HOSPICE | Age: 87
End: 2022-10-31

## 2022-10-31 PROCEDURE — 74011000250 HC RX REV CODE- 250: Performed by: STUDENT IN AN ORGANIZED HEALTH CARE EDUCATION/TRAINING PROGRAM

## 2022-10-31 PROCEDURE — 74011250637 HC RX REV CODE- 250/637: Performed by: INTERNAL MEDICINE

## 2022-10-31 PROCEDURE — 74011250636 HC RX REV CODE- 250/636: Performed by: INTERNAL MEDICINE

## 2022-10-31 PROCEDURE — 74011250637 HC RX REV CODE- 250/637: Performed by: STUDENT IN AN ORGANIZED HEALTH CARE EDUCATION/TRAINING PROGRAM

## 2022-10-31 PROCEDURE — 77010033678 HC OXYGEN DAILY

## 2022-10-31 PROCEDURE — 97530 THERAPEUTIC ACTIVITIES: CPT

## 2022-10-31 PROCEDURE — 65270000029 HC RM PRIVATE

## 2022-10-31 RX ORDER — DIPHENHYDRAMINE HCL 25 MG
25 CAPSULE ORAL
Status: DISCONTINUED | OUTPATIENT
Start: 2022-10-31 | End: 2022-11-01 | Stop reason: HOSPADM

## 2022-10-31 RX ADMIN — Medication: at 09:33

## 2022-10-31 RX ADMIN — CEFDINIR 300 MG: 250 POWDER, FOR SUSPENSION ORAL at 09:34

## 2022-10-31 RX ADMIN — SODIUM CHLORIDE, PRESERVATIVE FREE 10 ML: 5 INJECTION INTRAVENOUS at 15:03

## 2022-10-31 RX ADMIN — MIRTAZAPINE 30 MG: 15 TABLET, FILM COATED ORAL at 22:15

## 2022-10-31 RX ADMIN — FAMOTIDINE 20 MG: 20 TABLET, FILM COATED ORAL at 09:35

## 2022-10-31 RX ADMIN — HEPARIN SODIUM 5000 UNITS: 5000 INJECTION INTRAVENOUS; SUBCUTANEOUS at 15:04

## 2022-10-31 RX ADMIN — HEPARIN SODIUM 5000 UNITS: 5000 INJECTION INTRAVENOUS; SUBCUTANEOUS at 04:11

## 2022-10-31 RX ADMIN — DONEPEZIL HYDROCHLORIDE 5 MG: 5 TABLET, FILM COATED ORAL at 22:15

## 2022-10-31 NOTE — PROGRESS NOTES
Problem: Mobility Impaired (Adult and Pediatric)  Goal: *Acute Goals and Plan of Care (Insert Text)  Description: FUNCTIONAL STATUS PRIOR TO ADMISSION: patient ambulatory at time of discharge last admission with RW but upon returning home stopped mobilizing with overall decline. HOME SUPPORT PRIOR TO ADMISSION: The patient lived with daughter and son in law. Has caregiver during the day. Physical Therapy Goals  Initiated 10/27/2022  1. Patient will move from supine to sit and sit to supine  and roll side to side in bed with moderate assistance  within 7 day(s). 2.  Patient will transfer from bed to chair and chair to bed with moderate assistance  using the least restrictive device within 7 day(s). 3.  Patient will perform sit to stand with moderate assistance  within 7 day(s). Outcome: Progressing Towards Goal   PHYSICAL THERAPY TREATMENT  Patient: Kyra Cortes (24 y.o. female)  Date: 10/31/2022  Diagnosis: CHF (congestive heart failure) ([facility]) [I50.9]  Acute cystitis [N30.00] <principal problem not specified>      Precautions:    Chart, physical therapy assessment, plan of care and goals were reviewed. ASSESSMENT  Patient continues with skilled PT services and is progressing towards goals. Patient received in bed and both daughters present. Patient more alert today and interactive at time of session. Less assistance for all mobility and stood as well as transferred to [facility] with one person assist and returned to bed. Stood several time at bedside with MD present as well. Daughters definitely want to have mother home. If home may benefit from wheelchair and bed sided commode. Current Level of Function Impacting Discharge (mobility/balance): minimal assist    Other factors to consider for discharge: family considering hospice         PLAN :  Patient continues to benefit from skilled intervention to address the above impairments. Continue treatment per established plan of care.   to address goals. Recommendation for discharge: (in order for the patient to meet his/her long term goals)  Physical therapy at least 2 days/week in the home AND ensure assist and/or supervision for safety with mobility vs hospice    This discharge recommendation:  Has been made in collaboration with the attending provider and/or case management    IF patient discharges home will need the following DME: wheelchair       SUBJECTIVE:   Patient wanting to sit up and use BSC  OBJECTIVE DATA SUMMARY:   Critical Behavior:  Neurologic State: alert following directions  Orientation Level: Safety/Judgement: Decreased insight into deficits, Decreased awareness of environment, Decreased awareness of need for assistance, Decreased awareness of need for safety  Functional Mobility Training:  Bed Mobility:     Supine to Sit: Minimum assistance  Sit to Supine: Minimum assistance  Scooting: Stand-by assistance        Transfers:  Sit to Stand: Minimum assistance  Stand to Sit: Minimum assistance                             Balance:  Sitting: Impaired; Without support  Sitting - Static: Good (unsupported)  Sitting - Dynamic: Good (unsupported)  Standing: Impaired; With support  Standing - Static: Constant support; Fair  Standing - Dynamic : Constant support;Poor        Activity Tolerance:   Fair    After treatment patient left in no apparent distress:   Supine in bed, Call bell within reach, Caregiver / family present, and Side rails x 3    COMMUNICATION/COLLABORATION:   The patients plan of care was discussed with: Physician and Case management.      Thu Quinn, PT   Time Calculation: 18 mins

## 2022-10-31 NOTE — PROGRESS NOTES
Called by RN to assist w/PIV. Patient adamantly refusing VAT to assess and place PIV. RN made aware.

## 2022-10-31 NOTE — PROGRESS NOTES
Problem: Fatigue  Goal: Verbalize increase energy and improved vitality  Outcome: Not Progressing Towards Goal

## 2022-10-31 NOTE — PROGRESS NOTES
BRITTNEY:  1. RUR-14%  2. [facility] hospice referral sent, response pending. 3. BLS transport. CM noted of hospice consult. MD met with patient daughters in the room this morning. They are requesting hospital bed,paco lift, and wheelchair. Cm sent referral through [employer]. CM available as any other needs arise. Patient was open to [facility] home health services.      [facility], [facility]

## 2022-10-31 NOTE — HOSPICE
[facility]  [phone]     Patient Name: Enrique Ramos  YOB: 1927  Age: 80 y.o. [address] RN Note:  Hospice consult received, reviewing chart. Will follow up with Unit Nurse and Care Manager to discuss plan of care, patient status and discharge disposition within the hour. Information session scheduled with patient's daughter for 10 am tomorrow. Thank you for the opportunity to be of service to this patient.      Angel Mar RN  Clinical Nurse Liaison   [address]  [phone] (g) [phone]

## 2022-11-01 VITALS
SYSTOLIC BLOOD PRESSURE: 127 MMHG | WEIGHT: 117.73 LBS | TEMPERATURE: 97.9 F | RESPIRATION RATE: 16 BRPM | OXYGEN SATURATION: 94 % | HEIGHT: 60 IN | BODY MASS INDEX: 23.11 KG/M2 | HEART RATE: 86 BPM | DIASTOLIC BLOOD PRESSURE: 68 MMHG

## 2022-11-01 LAB
BACTERIA SPEC CULT: NORMAL
BACTERIA SPEC CULT: NORMAL
SERVICE CMNT-IMP: NORMAL
SERVICE CMNT-IMP: NORMAL

## 2022-11-01 PROCEDURE — 74011250636 HC RX REV CODE- 250/636: Performed by: STUDENT IN AN ORGANIZED HEALTH CARE EDUCATION/TRAINING PROGRAM

## 2022-11-01 PROCEDURE — 74011250637 HC RX REV CODE- 250/637: Performed by: INTERNAL MEDICINE

## 2022-11-01 PROCEDURE — 74011250636 HC RX REV CODE- 250/636: Performed by: INTERNAL MEDICINE

## 2022-11-01 PROCEDURE — 74011250637 HC RX REV CODE- 250/637: Performed by: STUDENT IN AN ORGANIZED HEALTH CARE EDUCATION/TRAINING PROGRAM

## 2022-11-01 RX ORDER — TRAMADOL HYDROCHLORIDE 50 MG/1
50 TABLET ORAL
Qty: 12 TABLET | Refills: 0 | Status: SHIPPED | OUTPATIENT
Start: 2022-11-01 | End: 2022-11-04

## 2022-11-01 RX ORDER — CEFDINIR 250 MG/5ML
300 POWDER, FOR SUSPENSION ORAL DAILY
Qty: 6 ML | Refills: 0 | Status: SHIPPED | OUTPATIENT
Start: 2022-11-02 | End: 2022-11-03

## 2022-11-01 RX ADMIN — FAMOTIDINE 20 MG: 20 TABLET, FILM COATED ORAL at 09:27

## 2022-11-01 RX ADMIN — ONDANSETRON 4 MG: 4 TABLET, ORALLY DISINTEGRATING ORAL at 12:35

## 2022-11-01 RX ADMIN — HEPARIN SODIUM 5000 UNITS: 5000 INJECTION INTRAVENOUS; SUBCUTANEOUS at 04:27

## 2022-11-01 RX ADMIN — Medication: at 09:27

## 2022-11-01 RX ADMIN — CEFDINIR 300 MG: 250 POWDER, FOR SUSPENSION ORAL at 09:56

## 2022-11-01 NOTE — PROGRESS NOTES
Problem:  Body Temperature -  Risk of, Imbalanced  Goal: Ability to maintain a body temperature within defined limits  Outcome: Progressing Towards Goal  Goal: Will regain or maintain usual level of consciousness  Outcome: Progressing Towards Goal  Goal: Complications related to the disease process, condition or treatment will be avoided or minimized  Outcome: Progressing Towards Goal     Problem: Isolation Precautions - Risk of Spread of Infection  Goal: Prevent transmission of infectious organism to others  Outcome: Progressing Towards Goal     Problem: Nutrition Deficits  Goal: Optimize nutrtional status  Outcome: Progressing Towards Goal

## 2022-11-01 NOTE — HOSPICE
[address] [facility]  [phone]    Patient Name: Carlyn Mata  YOB: 1927  Age: 80 y.o. [employer] RN Note:  Hospice consult noted. Chart reviewed. Plan of care discussed with patients nurse & care manager. In to meet with with daughters, Onofre Villarreal and Lux ackerman. Discussed Hospice philosophy, general plan of care, levels of care, services and on call procedures. Family information packet provided & reviewed with daughters. Time was spent discussing their mother's condition and decline. Lux ackerman feels this is all related to her having Covid. As we talked more they were able to see that with her progressing dementia, FTT and now fluid overload and pulmonary edema as well as low albumin/malnutrition that her body is failing. We talked about hospice focus being on comfort and quality of life. Lux ackerman was focused on IVF's if needed for dehydration. I explained why hospice does not recommend IVFs in end of life care and risk of fluid overload and discomfort. They were both very appreciative of my visit and want to take some time to discuss hospice vs HH and will reach out to me later today. They do acknowledge that their mother's wish is to be at home  Both daughters would like to speak with CM about HH a bit more before making their decision. Hospice available to set up home admission if they choose to have us involved in her care. Thank you for the opportunity to be of service to this patient.      Jinny Melara RN  Clinical Nurse Liaison   [facility]  [phone] (c) [phone]

## 2022-11-01 NOTE — PROGRESS NOTES
[address] [facility] Adult  Hospitalist Group                                                                                          Hospitalist Progress Note  Norberto Martinez MD  Answering service: [phone] or [phone] from in house phone        Date of Service:  2022  NAME:  Linda Pinto  :  3/15/1927  MRN:  [id number]      Admission Summary:   Linda Pinto is a 80 y.o. female Linda Pinto is a 80 y.o. female with past medical history of dementia with behavioral disturbance, GERD, hypertension, recurrent uti who presents to ER via EMS for concerns of fever, lethargy and pneumonia. Daughter at bedside states patient has been increasingly weak and lethargic over the last 48 hours. Family contacted [facility] who evaluated patient at home. She was diagnosed with dehydration but was unable to receive IV fluids due to lack of IV access. Furthermore family states chest x-ray obtained by dispatch [facility] showed pneumonia and she was referred to hospital.     Remarkable vitals on ER Presentations: vss  Labs Remarkable for: mag 1.5, bnp 1440  ER Images: Chest x-ray showed mild interstitial edema pattern  ER treatment: NONE     Interval history / Subjective:   Met with patient and her daughters with case management. Patient is lying in bed comfortably, on room air. No reports about pain from the patient or her daughters. [facility] team met with daughters, they have not decided for hospice at this point, wanted to go home with home health services. Explained to them that patient is medically stable for discharge. As for long-term prognosis, given her advanced age, acute illnesses ,FTT explained to them that she may find it difficult to bounce back and may continue to decline however could not say there is no chance at all she will not stabilize or show improvement. Encouraged daughters to reach out to [facility] should she decline once home.   She has been refusing IV placement or labs for few days. They asked for hospital bed, CM to check to see if she qualifies. Assessment & Plan:     Mild pulmonary edema  Clinically improved s/p diuresis with Lasix 20 mg IV x1  BNP improved  Repeat CXR shows small left effusion but holding further diuretics d/t risk of dehydration and soft BP in setting of poor PO intake, family understands and agrees  Last echo 2/2021 EF 66%  Repeat echo 10/27/22 with EF 56-21%, abn diastolic function    Failure to thrive, anorexia, generalized weakness  Likely r/t recent Covid infection 2 weeks ago, complicated by underlying dementia  Supportive care  Encourage PO intake  Already takes Remeron  Mild elev lactate, now cleared  Gentle mIVF, now stopped d/t no IV  TSH, B12, Vit D wnl  PT/OT  Family in agreement to discuss with hospice. Pleuritic chest pain, generalized body aches  Likely musculoskeletal vs postviral (covid)  EKG x2 without acute changes  Troponin wnl  IS as able, limited d/t pain  Tylenol (when willing to take)  Per family, significant improvement with tramadol. Reviewed risk versus benefit again. Hypotension, improving  S/p IVF as above      Hypophos, repleted    AMY, improving  S/p IV lasix x1  Cont mIVF as above    Dysuria  on Rocephin (allergy to pcn and sulfa; macrobid contraindicated with renal function)  Change to cefdinir since no IV and cannot get IM Rocephin d/t lidocaine allergy    Dementia  Continue home Remeron and Aricept      GERD  Home Pepcid    This is a 80year-old frail elderly exhibiting FTT with high risk of further decline or acute deterioration. Family willing to discuss with hospice. Code status: DNR  Prophylaxis: Heparin ppx  Care Plan discussed with: Patient/Family and Nurse  Anticipated Disposition: Home with home health services. Family not ready for hospice services at this point.      Hospital Problems  Date Reviewed: 11/6/2019            Codes Class Noted POA    CHF (congestive heart failure) [facility]) ICD-10-CM: I50.9  ICD-9-CM: 428.0  10/26/2022 Unknown        Acute cystitis ICD-10-CM: N30.00  ICD-9-CM: 595.0  2/22/2021 Unknown       Review of Systems:   Pertinent items are noted in HPI    Vital Signs:    Last 24hrs VS reviewed since prior progress note. Most recent are:  Visit Vitals  /68   Pulse 86   Temp 97.9 °F (36.6 °C)   Resp 16   Ht 5' (1.524 m)   Wt 53.4 kg (117 lb 11.6 oz)   SpO2 94%   BMI 22.99 kg/m²         Intake/Output Summary (Last 24 hours) at 11/1/2022 1341  Last data filed at 11/1/2022 0402  Gross per 24 hour   Intake --   Output 500 ml   Net -500 ml          Physical Examination:   I had a face to face encounter with this patient and independently examined them on 11/1/2022 as outlined below:    General: Lying in bed comfortably. Alert. Frail elderly, working with PT. EENT:  Anicteric sclerae. Dry MM  Resp:  Kyphotic. On room air. Decreased at bases, no wheezing or accessory muscle use  CV:  RRR, No audible murmur  Chest wall: TTP, back kyphosis  GI:  Soft, Non distended, Non tender  Neurologic:  She is alert, verbally communicative. Extremities: No edema or cyanosis  Psych:   Not anxious or agitated  Skin:  No rashes. No jaundice       Data Review:   I personally reviewed: vitals, labs, imaging results, notes    Labs:     No results for input(s): WBC, HGB, HCT, PLT, HGBEXT, HCTEXT, PLTEXT, HGBEXT, HCTEXT, PLTEXT in the last 72 hours. No results for input(s): NA, K, CL, CO2, BUN, CREA, GLU, CA, MG, PHOS, URICA in the last 72 hours. No results for input(s): ALT, AP, TBIL, TBILI, TP, ALB, GLOB, GGT, AML, LPSE in the last 72 hours. No lab exists for component: SGOT, GPT, AMYP, HLPSE    No results for input(s): INR, PTP, APTT, INREXT, INREXT in the last 72 hours. No results for input(s): FE, TIBC, PSAT, FERR in the last 72 hours.    Lab Results   Component Value Date/Time    Folate 75.0 (H) 02/22/2021 07:06 PM        No results for input(s): PH, PCO2, PO2 in the last 72 hours. No results for input(s): CPK, CKNDX, TROIQ in the last 72 hours.     No lab exists for component: CPKMB  No results found for: CHOL, CHOLX, CHLST, CHOLV, HDL, HDLP, LDL, LDLC, DLDLP, TGLX, TRIGL, TRIGP, CHHD, CHHDX  No results found for: GLUCPOC  Lab Results   Component Value Date/Time    Color DARK YELLOW 10/26/2022 10:30 PM    Appearance CLEAR 10/26/2022 10:30 PM    Specific gravity 1.017 10/26/2022 10:30 PM    pH (UA) 5.5 10/26/2022 10:30 PM    Protein 100 (A) 10/26/2022 10:30 PM    Glucose Negative 10/26/2022 10:30 PM    Ketone 15 (A) 10/26/2022 10:30 PM    Bilirubin Negative 10/26/2022 10:30 PM    Urobilinogen 0.2 10/26/2022 10:30 PM    Nitrites Negative 10/26/2022 10:30 PM    Leukocyte Esterase TRACE (A) 10/26/2022 10:30 PM    Epithelial cells FEW 10/26/2022 10:30 PM    Bacteria Negative 10/26/2022 10:30 PM    WBC 10-20 10/26/2022 10:30 PM    RBC 0-5 10/26/2022 10:30 PM       Medications Reviewed:     Current Facility-Administered Medications   Medication Dose Route Frequency    diphenhydrAMINE (BENADRYL) capsule 25 mg  25 mg Oral Q6H PRN    cefdinir (OMNICEF) 250 mg/5 mL oral suspension 300 mg  300 mg Oral DAILY    traMADoL (ULTRAM) tablet 50 mg  50 mg Oral Q6H PRN    sodium chloride (NS) flush 5-40 mL  5-40 mL IntraVENous Q8H    sodium chloride (NS) flush 5-40 mL  5-40 mL IntraVENous PRN    acetaminophen (TYLENOL) tablet 650 mg  650 mg Oral Q6H PRN    Or    acetaminophen (TYLENOL) suppository 650 mg  650 mg Rectal Q6H PRN    polyethylene glycol (MIRALAX) packet 17 g  17 g Oral DAILY PRN    ondansetron (ZOFRAN ODT) tablet 4 mg  4 mg Oral Q8H PRN    Or    ondansetron (ZOFRAN) injection 4 mg  4 mg IntraVENous Q6H PRN    donepeziL (ARICEPT) tablet 5 mg  5 mg Oral QHS    mirtazapine (REMERON) tablet 30 mg  30 mg Oral QHS    famotidine (PEPCID) tablet 20 mg  20 mg Oral DAILY    ammonium lactate (LAC-HYDRIN) 12 % lotion   Topical BID    heparin (porcine) injection 5,000 Units  5,000 Units SubCUTAneous Q12H sodium chloride (NS) flush 5-10 mL  5-10 mL IntraVENous PRN     ______________________________________________________________________  EXPECTED LENGTH OF STAY: 2d 14h  ACTUAL LENGTH OF STAY:          6                 Elvi Torres MD

## 2022-11-01 NOTE — PROGRESS NOTES
BRITTNEY:  1. RUR-12%  2. Return home with [facility] hh services and hospital bed(deliver after patient discharge). 3. BLS transport. NILDA and MD met with patient and her daughters at bedside. Evelia with [facility] hospice met with family at 8 a.m. After discussion with daughters and cm, they would like to take her home with home health services. Cm updated hospice, and will inform home health of expected d/c date. Family requesting hospital bed, referral sent to [facility]. [phone]- Patient is discharging home today with her daughters who are currently at bedside. The daughters would like to take her home before hospital bed is delivered at home. CM updated [facility]. and [facility] homecare is aware of discharge today. Daughter is aware that if hospital bed is not covered by insurance, they are willing to pay for rental.    Medicare pt has received, reviewed, and signed 2nd IM letter informing them of their right to appeal the discharge. Signed copy has been placed on pt bedside chart.         Harmony Mcallister [facility]

## 2022-11-01 NOTE — DISCHARGE INSTRUCTIONS
Discharge Instructions       PATIENT ID: Coni Harley  MRN: [id number]   YOB: 1927    DATE OF ADMISSION: 10/26/2022    DATE OF DISCHARGE: 11/1/2022      DISCHARGING PROVIDER: Marian Suazo MD    To contact this individual call [phone] and ask the  to page. If unavailable ask to be transferred the Adult Hospitalist Department. DISCHARGE DIAGNOSES and CARE RECOMMENDATIONS:     Recent COVID infection   Mild pulmonary edema resolved with diuretics. Failure to thrive, anorexia, generalized weakness,likely due to the recent Covid infection 2 weeks ago, complicated by underlying dementia and advanced age   The [employer] A[employer] describes failure to thrive (FTT) as a \"syndrome of weight loss, decreased appetite and poor nutrition, and inactivity, often accompanied by dehydration, depressive symptoms, impaired immune function, and low cholesterol\" [1]. Many of these features of FTT are actually defined as features of frailty, including weight loss, malnutrition, and inactivity. In geriatric practice, FTT describes a syndrome of global decline that occurs in older adults as a worsening of physical frailty that is frequently compounded by cognitive impairment and/or functional disability. Pleuritic chest pain, generalized body aches. Likely musculoskeletal vs postviral (covid),tramadol helped tremendously. A prescription for tramadol is sent to your pharmacy. If she would decline further and you decide to involve hospice,[facility] DIET:   Regular Diet      ACTIVITY:   Activity as tolerated and PT/OT per [address]...     PHYSICAL THERAPY x   OCCUPATIONAL THERAPY x   HOSPITAL TO HOME VISIT     DISPATCH HEALTH        PENDING TEST RESULTS:   At the time of discharge the following test results are still pending:   none              DISCHARGE MEDICATIONS:     My Medications        START taking these medications        Instructions Each Dose to Equal Morning Noon Evening Bedtime   cefdinir 250 mg/5 mL suspension  Commonly known as: OMNICEF  Start taking on: November 2, 2022    Your last dose was: Your next dose is: Take 6 mL by mouth daily for 1 dose. 300 mg                 traMADoL 50 mg tablet  Commonly known as: ULTRAM    Your last dose was: Your next dose is: Take 1 Tablet by mouth every six (6) hours as needed for Pain for up to 3 days. Max Daily Amount: 200 mg.   50 mg                        CONTINUE taking these medications        Instructions Each Dose to Equal Morning Noon Evening Bedtime   ascorbic acid (vitamin C) 1,000 mg tablet  Commonly known as: VITAMIN C    Your last dose was: Your next dose is: Take 1,000 mg by mouth daily. 1,000 mg                 b complex vitamins tablet    Your last dose was: Your next dose is: Take 1 Tab by mouth daily. 1 Tablet                 calcium carbonate 500 mg calcium (1,250 mg) capsule    Your last dose was: Your next dose is: Take 2 Tablets by mouth daily. 2 Tablet                 cyanocobalamin 1,000 mcg tablet    Your last dose was: Your next dose is: Take 1,000 mcg by mouth daily. 1,000 mcg                 donepeziL 5 mg tablet  Commonly known as: ARICEPT    Your last dose was: Your next dose is: Take 5 mg by mouth nightly. 5 mg                 famotidine 40 mg tablet  Commonly known as: PEPCID    Your last dose was: Your next dose is: Take 40 mg by mouth two (2) times a day. 40 mg                 L.acid,para-B. bifidum-S.therm 8 billion cell Cap cap  Commonly known as: RISAQUAD    Your last dose was: Your next dose is: Take 1 Capsule by mouth daily for 30 days. 1 Capsule                 mirtazapine 30 mg tablet  Commonly known as: REMERON    Your last dose was: Your next dose is:          Take 30 mg by mouth nightly. 30 mg                 ondansetron 4 mg disintegrating tablet  Commonly known as: Zofran ODT    Your last dose was: Your next dose is: Take 1 Tab by mouth every eight (8) hours as needed for Nausea. 4 mg                           Where to Get Your Medications        These medications were sent to [address] [place] [address], [address] [place] [id number]      Phone: [phone]   cefdinir 250 mg/5 mL suspension  traMADoL 50 mg tablet             It is important that you take the medication exactly as they are prescribed. Keep your medication in the bottles provided by the pharmacist and keep a list of the medication names, dosages, and times to be taken in your wallet. Do not take other medications without consulting your doctor. Signed:    Norma Moscoso MD  11/1/2022  3:42 PM

## 2022-11-01 NOTE — PROGRESS NOTES
[address] [facility] Adult  Hospitalist Group                                                                                          Hospitalist Progress Note  Jaime Mcqueen MD  Answering service: [phone] OR [phone] from in house phone        Date of Service:  10/31/2022  NAME:  Chaz Saavedra  :  3/15/1927  MRN:  [id number]      Admission Summary:   Chaz Saavedra is a 80 y.o. female Chaz Saavedra is a 80 y.o. female with past medical history of dementia with behavioral disturbance, GERD, hypertension, recurrent uti who presents to ER via EMS for concerns of fever, lethargy and pneumonia. Daughter at bedside states patient has been increasingly weak and lethargic over the last 48 hours. Family contacted dispatch [facility] who evaluated patient at home. She was diagnosed with dehydration but was unable to receive IV fluids due to lack of IV access. Furthermore family states chest x-ray obtained by dispatch health showed pneumonia and she was referred to hospital.     Remarkable vitals on ER Presentations: vss  Labs Remarkable for: mag 1.5, bnp 1440  ER Images: Chest x-ray showed mild interstitial edema pattern  ER treatment: NONE     Interval history / Subjective:   I have seen and examined patient this morning. Her daughters were present during. Physical therapist working with patient. Per daughters, she is overall significantly better, pain improved after tramadol. Patient working with PT, according to physical therapist, patient is definitely improved from last week. Previous attending has talked with her daughters regarding treatment goals mentioning hospice. They are interested to discuss with hospice, consult placed. They would like to return home on discharge. Patient was not orthostatic between laying and sitting.      Assessment & Plan:     Mild pulmonary edema  Clinically improved s/p diuresis with Lasix 20 mg IV x1  BNP improved  Repeat CXR shows small left effusion but holding further diuretics d/t risk of dehydration and soft BP in setting of poor PO intake, family understands and agrees  Last echo 2/2021 EF 66%  Repeat echo 10/27/22 with EF 13-91%, abn diastolic function    Failure to thrive, anorexia, generalized weakness  Likely r/t recent Covid infection 2 weeks ago, complicated by underlying dementia  Supportive care  Encourage PO intake  Already takes Remeron  Mild elev lactate, now cleared  Gentle mIVF, now stopped d/t no IV  TSH, B12, Vit D wnl  PT/OT  Family in agreement to discuss with hospice. Pleuritic chest pain, generalized body aches  Likely musculoskeletal vs postviral (covid)  EKG x2 without acute changes  Troponin wnl  IS as able, limited d/t pain  Tylenol (when willing to take)  Per family, significant improvement with tramadol. Reviewed risk versus benefit again. Hypotension, improving  S/p IVF as above      Hypophos, repleted    AMY, improving  S/p IV lasix x1  Cont mIVF as above    Dysuria  on Rocephin (allergy to pcn and sulfa; macrobid contraindicated with renal function)  Change to cefdinir since no IV and cannot get IM Rocephin d/t lidocaine allergy    Dementia  Continue home Remeron and Aricept      GERD  Home Pepcid    This is a 80year-old frail elderly exhibiting FTT with high risk of further decline or acute deterioration. Family willing to discuss with hospice. Code status: DNR  Prophylaxis: Heparin ppx  Care Plan discussed with: Patient/Family and Nurse  Anticipated Disposition: Family's goal is to return home. Hospice consult pending     Hospital Problems  Date Reviewed: 11/6/2019            Codes Class Noted POA    CHF (congestive heart failure) ([facility]) ICD-10-CM: I50.9  ICD-9-CM: 428.0  10/26/2022 Unknown        Acute cystitis ICD-10-CM: N30.00  ICD-9-CM: 595.0  2/22/2021 Unknown       Review of Systems:   Pertinent items are noted in HPI    Vital Signs:    Last 24hrs VS reviewed since prior progress note.  Most recent are:  Visit Vitals  /63 (BP 1 Location: Right arm)   Pulse 78   Temp 97.6 °F (36.4 °C)   Resp 16   Ht 5' (1.524 m)   Wt 51 kg (112 lb 7 oz)   SpO2 90%   BMI 21.96 kg/m²       No intake or output data in the 24 hours ending 10/31/22 2005       Physical Examination:   I had a face to face encounter with this patient and independently examined them on 10/31/2022 as outlined below:    General: Alert. Frail elderly, working with PT. EENT:  Anicteric sclerae. Dry MM  Resp:  Kyphotic. On room air. Decreased at bases, no wheezing or accessory muscle use  CV:  RRR, No audible murmur  Chest wall: TTP, back kyphosis  GI:  Soft, Non distended, Non tender  Neurologic:  She is alert, verbally communicative. Extremities: No edema or cyanosis  Psych:   Not anxious or agitated  Skin:  No rashes. No jaundice       Data Review:   I personally reviewed: vitals, labs, imaging results, notes    Labs:     No results for input(s): WBC, HGB, HCT, PLT, HGBEXT, HCTEXT, PLTEXT, HGBEXT, HCTEXT, PLTEXT in the last 72 hours. Recent Labs     10/29/22  0301      K 4.0      CO2 28   BUN 22*   CREA 1.54*      CA 8.7   MG 2.2   PHOS 2.8       Recent Labs     10/29/22  0301   ALB 2.2*       No results for input(s): INR, PTP, APTT, INREXT, INREXT in the last 72 hours. No results for input(s): FE, TIBC, PSAT, FERR in the last 72 hours. Lab Results   Component Value Date/Time    Folate 75.0 (H) 02/22/2021 07:06 PM        No results for input(s): PH, PCO2, PO2 in the last 72 hours. No results for input(s): CPK, CKNDX, TROIQ in the last 72 hours.     No lab exists for component: CPKMB  No results found for: CHOL, CHOLX, CHLST, CHOLV, HDL, HDLP, LDL, LDLC, DLDLP, TGLX, TRIGL, TRIGP, CHHD, CHHDX  No results found for: GLUCPOC  Lab Results   Component Value Date/Time    Color DARK YELLOW 10/26/2022 10:30 PM    Appearance CLEAR 10/26/2022 10:30 PM    Specific gravity 1.017 10/26/2022 10:30 PM    pH (UA) 5.5 10/26/2022 10:30 PM Protein 100 (A) 10/26/2022 10:30 PM    Glucose Negative 10/26/2022 10:30 PM    Ketone 15 (A) 10/26/2022 10:30 PM    Bilirubin Negative 10/26/2022 10:30 PM    Urobilinogen 0.2 10/26/2022 10:30 PM    Nitrites Negative 10/26/2022 10:30 PM    Leukocyte Esterase TRACE (A) 10/26/2022 10:30 PM    Epithelial cells FEW 10/26/2022 10:30 PM    Bacteria Negative 10/26/2022 10:30 PM    WBC 10-20 10/26/2022 10:30 PM    RBC 0-5 10/26/2022 10:30 PM       Medications Reviewed:     Current Facility-Administered Medications   Medication Dose Route Frequency    diphenhydrAMINE (BENADRYL) capsule 25 mg  25 mg Oral Q6H PRN    cefdinir (OMNICEF) 250 mg/5 mL oral suspension 300 mg  300 mg Oral DAILY    traMADoL (ULTRAM) tablet 50 mg  50 mg Oral Q6H PRN    sodium chloride (NS) flush 5-40 mL  5-40 mL IntraVENous Q8H    sodium chloride (NS) flush 5-40 mL  5-40 mL IntraVENous PRN    acetaminophen (TYLENOL) tablet 650 mg  650 mg Oral Q6H PRN    Or    acetaminophen (TYLENOL) suppository 650 mg  650 mg Rectal Q6H PRN    polyethylene glycol (MIRALAX) packet 17 g  17 g Oral DAILY PRN    ondansetron (ZOFRAN ODT) tablet 4 mg  4 mg Oral Q8H PRN    Or    ondansetron (ZOFRAN) injection 4 mg  4 mg IntraVENous Q6H PRN    donepeziL (ARICEPT) tablet 5 mg  5 mg Oral QHS    mirtazapine (REMERON) tablet 30 mg  30 mg Oral QHS    famotidine (PEPCID) tablet 20 mg  20 mg Oral DAILY    ammonium lactate (LAC-HYDRIN) 12 % lotion   Topical BID    heparin (porcine) injection 5,000 Units  5,000 Units SubCUTAneous Q12H    dextrose 5% lactated ringers infusion  50 mL/hr IntraVENous CONTINUOUS    sodium chloride (NS) flush 5-10 mL  5-10 mL IntraVENous PRN     ______________________________________________________________________  EXPECTED LENGTH OF STAY: 2d 14h  ACTUAL LENGTH OF STAY:          5                 Laurent Chavez MD

## 2022-11-03 ENCOUNTER — HOME CARE VISIT (OUTPATIENT)
Dept: SCHEDULING | Facility: HOME HEALTH | Age: 87
End: 2022-11-03
Payer: MEDICARE

## 2022-11-03 PROCEDURE — G0152 HHCP-SERV OF OT,EA 15 MIN: HCPCS

## 2022-11-03 PROCEDURE — G0151 HHCP-SERV OF PT,EA 15 MIN: HCPCS

## 2022-11-03 PROCEDURE — 400013 HH SOC

## 2022-11-04 VITALS
DIASTOLIC BLOOD PRESSURE: 58 MMHG | HEART RATE: 62 BPM | SYSTOLIC BLOOD PRESSURE: 106 MMHG | RESPIRATION RATE: 16 BRPM | TEMPERATURE: 97.8 F | OXYGEN SATURATION: 100 %

## 2022-11-05 VITALS
HEART RATE: 83 BPM | OXYGEN SATURATION: 97 % | DIASTOLIC BLOOD PRESSURE: 60 MMHG | RESPIRATION RATE: 16 BRPM | SYSTOLIC BLOOD PRESSURE: 100 MMHG

## 2022-11-07 ENCOUNTER — HOME CARE VISIT (OUTPATIENT)
Dept: SCHEDULING | Facility: HOME HEALTH | Age: 87
End: 2022-11-07
Payer: MEDICARE

## 2022-11-07 VITALS
OXYGEN SATURATION: 96 % | TEMPERATURE: 96 F | SYSTOLIC BLOOD PRESSURE: 61 MMHG | HEART RATE: 84 BPM | DIASTOLIC BLOOD PRESSURE: 49 MMHG

## 2022-11-07 PROCEDURE — G0158 HHC OT ASSISTANT EA 15: HCPCS

## 2022-11-09 ENCOUNTER — HOME CARE VISIT (OUTPATIENT)
Dept: HOME HEALTH SERVICES | Facility: HOME HEALTH | Age: 87
End: 2022-11-09
Payer: MEDICARE

## 2022-11-09 NOTE — DISCHARGE SUMMARY
Physician Discharge Summary     Patient ID:    Crissy Sandoval  [id number]  21 y.o.  3/15/1927    Admit date: 10/26/2022    Discharge date and time: 11/9/2022    Hospital Diagnoses and Treatment Rendered:   Admission Summary:   Crissy Sandoval is a 80 y.o. female Crissy Sandoval is a 80 y.o. female with past medical history of dementia with behavioral disturbance, GERD, hypertension, recurrent uti who presents to ER via EMS for concerns of fever, lethargy and pneumonia. Daughter at bedside states patient has been increasingly weak and lethargic over the last 48 hours. Family contacted [facility] who evaluated patient at home. She was diagnosed with dehydration but was unable to receive IV fluids due to lack of IV access. Furthermore [facility] chest x-ray obtained by [facility] showed pneumonia and she was referred to hospital.    This elderly lady who had COVID about 2 weeks ago presented with generalized weakness, lethargy and was found to have dehydration, AMY, electrolyte disorders and mild pulm edema. Mild pulmonary edema  Clinically improved s/p diuresis with Lasix 20 mg IV x1  BNP improved  Last echo 2/2021 EF 66%  Repeat echo 10/27/22 with EF 17-39%, abn diastolic function  Patient remained stable respiratory wise, on room air through to discharge. Failure to thrive, anorexia, generalized weakness  Likely r/t recent Covid infection 2 weeks ago, complicated by underlying dementia  Supportive care  Encourage PO intake  Already takes Remeron  Mild elev lactate, which has subsequently cleared with IV fluids. TSH, B12, Vit D wnl       Pleuritic chest pain, generalized body aches, with negative work-up this is most probably musculoskeletal versus postviral (COVID  Pain significantly improved with tramadol. Hypotension improved IV fluids. Hypophos, repleted     AMY, improved with IV fluids. Dysuria  Treated with antibiotics. Dementia  Continue home Remeron and Aricept      GERD  Home Pepcid     This is a 80year-old frail elderly exhibiting FTT with high risk of further decline or acute deterioration. We had extensive conversation with patient's daughters. Family are undecided about hospice at this time however they will reach out to [facility] if patient declines, she is now discharged home with home health services with [facility] home health agency. Chronic Diagnoses:    Problem List as of 11/1/2022 Date Reviewed: 11/6/2019            Codes Class Noted - Resolved    CHF (congestive heart failure) ([facility] [email or web address] 75.) ICD-10-CM: I50.9  ICD-9-CM: 428.0  10/26/2022 - Present        Pneumonia ICD-10-CM: J18.9  ICD-9-CM: 486  10/16/2022 - Present        Acute cystitis ICD-10-CM: N30.00  ICD-9-CM: 595.0  2/22/2021 - Present        Syncope ICD-10-CM: R55  ICD-9-CM: 780.2  4/11/2016 - Present        Hypertension ICD-10-CM: I10  ICD-9-CM: 401.9  4/11/2016 - Present        Dementia ([facility] [[email or web address] 75.) ICD-10-CM: F03.90  ICD-9-CM: 294.20  4/11/2016 - Present        Pelvic fracture ([facility] [[email or web address] 75.) ICD-10-CM: S32. 9XXA  ICD-9-CM: 808.8  8/27/2015 - Present        Diarrhea ICD-10-CM: R19.7  ICD-9-CM: 787.91  5/21/2014 - Present        Colitis due to Clostridium difficile ICD-10-CM: A04.72  ICD-9-CM: 008.45  4/14/2014 - Present        RESOLVED: Hypotension ICD-10-CM: I95.9  ICD-9-CM: 458.9  7/3/2014 - 7/10/2014        RESOLVED: Hypotension, unspecified ICD-10-CM: I95.9  ICD-9-CM: 458.9  3/28/2014 - 7/10/2014           Discharge Medications:   Discharge Medication List as of 11/1/2022  4:20 PM        START taking these medications    Details   cefdinir (OMNICEF) 250 mg/5 mL suspension Take 6 mL by mouth daily for 1 dose., Normal, Disp-6 mL, R-0      traMADoL (ULTRAM) 50 mg tablet Take 1 Tablet by mouth every six (6) hours as needed for Pain for up to 3 days.  Max Daily Amount: 200 mg., Normal, Disp-12 Tablet, R-0           CONTINUE these medications which have NOT CHANGED    Details   famotidine (PEPCID) 40 mg tablet Take 40 mg by mouth two (2) times a day., Historical Med      cyanocobalamin 1,000 mcg tablet Take 1,000 mcg by mouth daily. , Historical Med      calcium carbonate 500 mg calcium (1,250 mg) capsule Take 2 Tablets by mouth daily. , Historical Med      L.acid,para-B. bifidum-S.therm (RISAQUAD) 8 billion cell cap cap Take 1 Capsule by mouth daily for 30 days. , No Print, Disp-30 Capsule, R-0      ondansetron (ZOFRAN ODT) 4 mg disintegrating tablet Take 1 Tab by mouth every eight (8) hours as needed for Nausea. , Print, Disp-12 Tab, R-0      mirtazapine (REMERON) 30 mg tablet Take 30 mg by mouth nightly., Historical Med      b complex vitamins tablet Take 1 Tab by mouth daily. , Historical Med      donepezil (ARICEPT) 5 mg tablet Take 5 mg by mouth nightly., Historical Med      ascorbic acid, vitamin C, (VITAMIN C) 1,000 mg tablet Take 1,000 mg by mouth daily. , Historical Med               Follow up Care: Follow-up Information       Follow up With Specialties Details Why [address] [facility] Call in 1 day(s) Contact agency within 24 hours of discharge if you have not heard from them. [address]    Sarah Mattson MD Family Medicine   [address] [phone]            1. Sarah Mattson MD in 1-2 weeks. Please call to set up an appointment shortly after discharge. Diet:  Regular Diet    Disposition:  Home with home health    PATIENT CONDITION AT DISCHARGE:   Functional status:  Poor       Cognition   Dementia     Catheters/lines (plus indication) NONE      Advanced Directive:   FULL X   DNR      Discharge Exam:  GENERAL:  Patient is alert, oriented to place and person. She is on room air, no evidence of distress.   HEENT:  Normocephalic, atraumatic, non icteric sclerae, non pallor conjuctivae, EOMs intact, PERRLA. NECK: Supple, trachea midline, no adenopathy, no thyromegally or tenderness, no carotid bruit and no JVD. LUNGS:   Vesicular breath sounds bilaterally, no added sounds. HEART:   S1 and S2 well heard,RRR,  no murmur, click, rub or gallop. ABDOMEB:   Soft, non-tender. Normoactive bowel sounds. No masses,  No organomegaly. EXTREMETIES:  Atraumatic, acyanotic, no edema  PULSES: 2+ and symmetric all extremities. SKIN:  No rashes or lesions  NEUROLOGY: Alert and oriented to PPT, CNII-XII intact. Motor and sensory exam grossly intact. CONSULTATIONS: None    Significant Diagnostic Studies:   No results for input(s): WBC, HGB, HCT, PLT, HGBEXT, HCTEXT, PLTEXT in the last 72 hours. No results for input(s): NA, K, CL, CO2, BUN, CREA, GLU, CA, MG, PHOS, URICA in the last 72 hours. No results for input(s): AP, TBIL, TP, ALB, GLOB, GGT, AML, LPSE in the last 72 hours. No lab exists for component: SGOT, GPT, AMYP, HLPSE  No results for input(s): INR, PTP, APTT, INREXT in the last 72 hours. No results for input(s): FE, TIBC, PSAT, FERR in the last 72 hours. No results for input(s): PH, PCO2, PO2 in the last 72 hours. No results for input(s): CPK, CKMB in the last 72 hours. No lab exists for component: TROPONINI  No components found for: [place]      Greater than 30 minutes were spent with the patient on counseling and coordination of care      Signed:   Abbey Owusu MD  11/9/2022  8:28 AM

## 2022-11-10 ENCOUNTER — HOME CARE VISIT (OUTPATIENT)
Dept: HOME HEALTH SERVICES | Facility: HOME HEALTH | Age: 87
End: 2022-11-10
Payer: MEDICARE

## 2022-11-11 ENCOUNTER — HOME CARE VISIT (OUTPATIENT)
Dept: HOME HEALTH SERVICES | Facility: HOME HEALTH | Age: 87
End: 2022-11-11
Payer: MEDICARE

## 2022-11-14 ENCOUNTER — HOME CARE VISIT (OUTPATIENT)
Dept: HOME HEALTH SERVICES | Facility: HOME HEALTH | Age: 87
End: 2022-11-14
Payer: MEDICARE

## 2022-11-17 ENCOUNTER — HOME CARE VISIT (OUTPATIENT)
Dept: HOME HEALTH SERVICES | Facility: HOME HEALTH | Age: 87
End: 2022-11-17
Payer: MEDICARE

## 2022-11-21 ENCOUNTER — HOME CARE VISIT (OUTPATIENT)
Dept: HOME HEALTH SERVICES | Facility: HOME HEALTH | Age: 87
End: 2022-11-21
Payer: MEDICARE

## 2022-11-22 ENCOUNTER — HOME CARE VISIT (OUTPATIENT)
Dept: HOME HEALTH SERVICES | Facility: HOME HEALTH | Age: 87
End: 2022-11-22
Payer: MEDICARE

## 2022-11-25 ENCOUNTER — HOME CARE VISIT (OUTPATIENT)
Dept: HOME HEALTH SERVICES | Facility: HOME HEALTH | Age: 87
End: 2022-11-25
Payer: MEDICARE

## 2023-10-22 ENCOUNTER — APPOINTMENT (OUTPATIENT)
Facility: HOSPITAL | Age: 88
End: 2023-10-22
Payer: MEDICAID

## 2023-10-22 ENCOUNTER — HOSPITAL ENCOUNTER (EMERGENCY)
Facility: HOSPITAL | Age: 88
Discharge: HOME | End: 2023-10-22
Attending: EMERGENCY MEDICINE
Payer: MEDICAID

## 2023-10-22 VITALS
DIASTOLIC BLOOD PRESSURE: 64 MMHG | TEMPERATURE: 97.4 F | SYSTOLIC BLOOD PRESSURE: 131 MMHG | OXYGEN SATURATION: 98 % | BODY MASS INDEX: 19.91 KG/M2 | RESPIRATION RATE: 15 BRPM | WEIGHT: 101.41 LBS | HEART RATE: 67 BPM | HEIGHT: 60 IN
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
## 2023-10-22 DIAGNOSIS — S20.211A CONTUSION OF RIGHT CHEST WALL, INITIAL ENCOUNTER: Primary | ICD-10-CM

## 2023-10-22 LAB
ALBUMIN SERPL-MCNC: 3.4 G/DL (ref 3.5–5)
ALBUMIN/GLOB SERPL: 0.7 (ref 1.1–2.2)
ALP SERPL-CCNC: 105 U/L (ref 45–117)
ALT SERPL-CCNC: 14 U/L (ref 12–78)
ANION GAP SERPL CALC-SCNC: 5 MMOL/L (ref 5–15)
AST SERPL-CCNC: 15 U/L (ref 15–37)
BASOPHILS # BLD: 0 K/UL (ref 0–0.1)
BASOPHILS NFR BLD: 1 % (ref 0–1)
BILIRUB SERPL-MCNC: 0.5 MG/DL (ref 0.2–1)
BUN SERPL-MCNC: 18 MG/DL (ref 6–20)
BUN/CREAT SERPL: 13 (ref 12–20)
CALCIUM SERPL-MCNC: 9.3 MG/DL (ref 8.5–10.1)
CHLORIDE SERPL-SCNC: 104 MMOL/L (ref 97–108)
CO2 SERPL-SCNC: 27 MMOL/L (ref 21–32)
COMMENT:: NORMAL
CREAT SERPL-MCNC: 1.35 MG/DL (ref 0.55–1.02)
DIFFERENTIAL METHOD BLD: ABNORMAL
EOSINOPHIL # BLD: 0.1 K/UL (ref 0–0.4)
EOSINOPHIL NFR BLD: 3 % (ref 0–7)
ERYTHROCYTE [DISTWIDTH] IN BLOOD BY AUTOMATED COUNT: 14.5 % (ref 11.5–14.5)
GLOBULIN SER CALC-MCNC: 4.6 G/DL (ref 2–4)
GLUCOSE SERPL-MCNC: 136 MG/DL (ref 65–100)
HCT VFR BLD AUTO: 35.5 % (ref 35–47)
HGB BLD-MCNC: 11.4 G/DL (ref 11.5–16)
IMM GRANULOCYTES # BLD AUTO: 0 K/UL (ref 0–0.04)
IMM GRANULOCYTES NFR BLD AUTO: 0 % (ref 0–0.5)
LYMPHOCYTES # BLD: 1.1 K/UL (ref 0.8–3.5)
LYMPHOCYTES NFR BLD: 26 % (ref 12–49)
MAGNESIUM SERPL-MCNC: 1.7 MG/DL (ref 1.6–2.4)
MCH RBC QN AUTO: 27.9 PG (ref 26–34)
MCHC RBC AUTO-ENTMCNC: 32.1 G/DL (ref 30–36.5)
MCV RBC AUTO: 87 FL (ref 80–99)
MONOCYTES # BLD: 0.4 K/UL (ref 0–1)
MONOCYTES NFR BLD: 10 % (ref 5–13)
NEUTS SEG # BLD: 2.7 K/UL (ref 1.8–8)
NEUTS SEG NFR BLD: 60 % (ref 32–75)
NRBC # BLD: 0 K/UL (ref 0–0.01)
NRBC BLD-RTO: 0 PER 100 WBC
NT PRO BNP: 161 PG/ML
PLATELET # BLD AUTO: 212 K/UL (ref 150–400)
PMV BLD AUTO: 10.9 FL (ref 8.9–12.9)
POTASSIUM SERPL-SCNC: 3.8 MMOL/L (ref 3.5–5.1)
PROT SERPL-MCNC: 8 G/DL (ref 6.4–8.2)
RBC # BLD AUTO: 4.08 M/UL (ref 3.8–5.2)
SODIUM SERPL-SCNC: 136 MMOL/L (ref 136–145)
SPECIMEN HOLD: NORMAL
TROPONIN I SERPL HS-MCNC: 11 NG/L (ref 0–51)
WBC # BLD AUTO: 4.4 K/UL (ref 3.6–11)

## 2023-10-22 PROCEDURE — 80053 COMPREHEN METABOLIC PANEL: CPT

## 2023-10-22 PROCEDURE — 85025 COMPLETE CBC W/AUTO DIFF WBC: CPT

## 2023-10-22 PROCEDURE — 84484 ASSAY OF TROPONIN QUANT: CPT

## 2023-10-22 PROCEDURE — 99284 EMERGENCY DEPT VISIT MOD MDM: CPT

## 2023-10-22 PROCEDURE — 71250 CT THORAX DX C-: CPT

## 2023-10-22 PROCEDURE — 83735 ASSAY OF MAGNESIUM: CPT

## 2023-10-22 PROCEDURE — 36415 COLL VENOUS BLD VENIPUNCTURE: CPT

## 2023-10-22 PROCEDURE — 83880 ASSAY OF NATRIURETIC PEPTIDE: CPT

## 2023-10-22 ASSESSMENT — PAIN DESCRIPTION - DESCRIPTORS: DESCRIPTORS: DULL;ACHING

## 2023-10-22 ASSESSMENT — PAIN SCALES - GENERAL: PAINLEVEL_OUTOF10: 10

## 2023-10-22 ASSESSMENT — PAIN DESCRIPTION - PAIN TYPE: TYPE: ACUTE PAIN

## 2023-10-22 ASSESSMENT — PAIN DESCRIPTION - LOCATION: LOCATION: GENERALIZED

## 2023-10-22 NOTE — ED NOTES
Discharge instructions reviewed with patient and family members. Patient in wheelchair out of ED. Vitals stable.       Jhoana Domínguez RN  10/22/23 [id number]

## 2023-10-22 NOTE — ED TRIAGE NOTES
Patient arrives by EMS for a ground level fall that occurred Thursday and SOB that began this morning. Denies LOC, hitting head, and blood thinners. Daughter reports hx of dementia.  Patient did vomit x1 prior to arrival.

## 2025-08-18 ENCOUNTER — APPOINTMENT (OUTPATIENT)
Facility: HOSPITAL | Age: 89
End: 2025-08-18
Payer: MEDICARE

## 2025-08-18 ENCOUNTER — HOSPITAL ENCOUNTER (EMERGENCY)
Facility: HOSPITAL | Age: 89
Discharge: HOME | End: 2025-08-18
Attending: STUDENT IN AN ORGANIZED HEALTH CARE EDUCATION/TRAINING PROGRAM
Payer: MEDICARE

## 2025-08-18 VITALS
RESPIRATION RATE: 18 BRPM | TEMPERATURE: 98.2 F | OXYGEN SATURATION: 99 % | DIASTOLIC BLOOD PRESSURE: 68 MMHG | HEIGHT: 60 IN | BODY MASS INDEX: 19.81 KG/M2 | HEART RATE: 83 BPM | SYSTOLIC BLOOD PRESSURE: 129 MMHG

## 2025-08-18 DIAGNOSIS — U07.1 COVID-19: Primary | ICD-10-CM

## 2025-08-18 DIAGNOSIS — R53.83 OTHER FATIGUE: ICD-10-CM

## 2025-08-18 LAB
ALBUMIN SERPL-MCNC: 3.4 G/DL (ref 3.5–5.2)
ALBUMIN/GLOB SERPL: 0.8 (ref 1.1–2.2)
ALP SERPL-CCNC: 99 U/L (ref 35–104)
ALT SERPL-CCNC: <5 U/L (ref 10–35)
ANION GAP SERPL CALC-SCNC: 12 MMOL/L (ref 2–14)
AST SERPL-CCNC: 18 U/L (ref 10–35)
BASOPHILS # BLD: 0.02 K/UL (ref 0–0.1)
BASOPHILS NFR BLD: 0.5 % (ref 0–1)
BILIRUB SERPL-MCNC: 0.8 MG/DL (ref 0–1.2)
BUN SERPL-MCNC: 15 MG/DL (ref 8–23)
BUN/CREAT SERPL: 12 (ref 12–20)
CALCIUM SERPL-MCNC: 9.4 MG/DL (ref 8.2–9.6)
CHLORIDE SERPL-SCNC: 97 MMOL/L (ref 98–107)
CO2 SERPL-SCNC: 24 MMOL/L (ref 20–29)
COMMENT:: NORMAL
CREAT SERPL-MCNC: 1.28 MG/DL (ref 0.6–1)
DIFFERENTIAL METHOD BLD: ABNORMAL
EOSINOPHIL # BLD: 0.02 K/UL (ref 0–0.4)
EOSINOPHIL NFR BLD: 0.5 % (ref 0–7)
ERYTHROCYTE [DISTWIDTH] IN BLOOD BY AUTOMATED COUNT: 14.2 % (ref 11.5–14.5)
GLOBULIN SER CALC-MCNC: 4 G/DL (ref 2–4)
GLUCOSE SERPL-MCNC: 93 MG/DL (ref 65–100)
HCT VFR BLD AUTO: 31.5 % (ref 35–47)
HGB BLD-MCNC: 10.2 G/DL (ref 11.5–16)
IMM GRANULOCYTES # BLD AUTO: 0.02 K/UL (ref 0–0.04)
IMM GRANULOCYTES NFR BLD AUTO: 0.5 % (ref 0–0.5)
LYMPHOCYTES # BLD: 0.76 K/UL (ref 0.8–3.5)
LYMPHOCYTES NFR BLD: 20.1 % (ref 12–49)
MCH RBC QN AUTO: 28.2 PG (ref 26–34)
MCHC RBC AUTO-ENTMCNC: 32.4 G/DL (ref 30–36.5)
MCV RBC AUTO: 87 FL (ref 80–99)
MONOCYTES # BLD: 0.49 K/UL (ref 0–1)
MONOCYTES NFR BLD: 12.8 % (ref 5–13)
NEUTS SEG # BLD: 2.49 K/UL (ref 1.8–8)
NEUTS SEG NFR BLD: 65.6 % (ref 32–75)
NRBC # BLD: 0 K/UL (ref 0–0.01)
NRBC BLD-RTO: 0 PER 100 WBC
PLATELET # BLD AUTO: 185 K/UL (ref 150–400)
PMV BLD AUTO: 10.8 FL (ref 8.9–12.9)
POTASSIUM SERPL-SCNC: 4.4 MMOL/L (ref 3.5–5.1)
PROT SERPL-MCNC: 7.4 G/DL (ref 6.4–8.3)
RBC # BLD AUTO: 3.62 M/UL (ref 3.8–5.2)
RBC MORPH BLD: ABNORMAL
SODIUM SERPL-SCNC: 133 MMOL/L (ref 136–145)
SPECIMEN HOLD: NORMAL
WBC # BLD AUTO: 3.8 K/UL (ref 3.6–11)

## 2025-08-18 PROCEDURE — 2580000003 HC RX 258: Performed by: STUDENT IN AN ORGANIZED HEALTH CARE EDUCATION/TRAINING PROGRAM

## 2025-08-18 PROCEDURE — 71046 X-RAY EXAM CHEST 2 VIEWS: CPT

## 2025-08-18 PROCEDURE — 99284 EMERGENCY DEPT VISIT MOD MDM: CPT

## 2025-08-18 PROCEDURE — 80053 COMPREHEN METABOLIC PANEL: CPT

## 2025-08-18 PROCEDURE — 85025 COMPLETE CBC W/AUTO DIFF WBC: CPT

## 2025-08-18 RX ORDER — 0.9 % SODIUM CHLORIDE 0.9 %
500 INTRAVENOUS SOLUTION INTRAVENOUS ONCE
Status: COMPLETED | OUTPATIENT
Start: 2025-08-18 | End: 2025-08-18

## 2025-08-18 RX ADMIN — SODIUM CHLORIDE 500 ML: 0.9 INJECTION, SOLUTION INTRAVENOUS at 14:33

## 2025-08-18 ASSESSMENT — PAIN - FUNCTIONAL ASSESSMENT: PAIN_FUNCTIONAL_ASSESSMENT: 0-10

## 2025-08-18 ASSESSMENT — PAIN SCALES - GENERAL: PAINLEVEL_OUTOF10: 0
